# Patient Record
Sex: MALE | Race: ASIAN | NOT HISPANIC OR LATINO
[De-identification: names, ages, dates, MRNs, and addresses within clinical notes are randomized per-mention and may not be internally consistent; named-entity substitution may affect disease eponyms.]

---

## 2018-11-14 PROBLEM — Z00.00 ENCOUNTER FOR PREVENTIVE HEALTH EXAMINATION: Status: ACTIVE | Noted: 2018-11-14

## 2018-11-29 ENCOUNTER — APPOINTMENT (OUTPATIENT)
Dept: THORACIC SURGERY | Facility: CLINIC | Age: 63
End: 2018-11-29
Payer: COMMERCIAL

## 2018-11-29 VITALS
OXYGEN SATURATION: 100 % | DIASTOLIC BLOOD PRESSURE: 79 MMHG | TEMPERATURE: 97.5 F | SYSTOLIC BLOOD PRESSURE: 146 MMHG | HEART RATE: 88 BPM | RESPIRATION RATE: 16 BRPM | WEIGHT: 137 LBS | BODY MASS INDEX: 22.02 KG/M2 | HEIGHT: 66 IN

## 2018-11-29 PROCEDURE — 99204 OFFICE O/P NEW MOD 45 MIN: CPT

## 2018-11-29 RX ORDER — SENNOSIDES 8.6 MG/1
TABLET ORAL
Refills: 0 | Status: ACTIVE | COMMUNITY

## 2018-11-29 RX ORDER — LOSARTAN POTASSIUM 50 MG/1
50 TABLET, FILM COATED ORAL
Refills: 0 | Status: ACTIVE | COMMUNITY

## 2018-11-29 RX ORDER — SIMVASTATIN 5 MG/1
5 TABLET, FILM COATED ORAL
Refills: 0 | Status: ACTIVE | COMMUNITY

## 2018-12-03 ENCOUNTER — RESULT REVIEW (OUTPATIENT)
Age: 63
End: 2018-12-03

## 2018-12-03 ENCOUNTER — OUTPATIENT (OUTPATIENT)
Dept: OUTPATIENT SERVICES | Facility: HOSPITAL | Age: 63
LOS: 1 days | Discharge: HOME | End: 2018-12-03
Payer: MEDICAID

## 2018-12-03 ENCOUNTER — APPOINTMENT (OUTPATIENT)
Dept: THORACIC SURGERY | Facility: CLINIC | Age: 63
End: 2018-12-03

## 2018-12-03 VITALS
WEIGHT: 136.91 LBS | OXYGEN SATURATION: 98 % | SYSTOLIC BLOOD PRESSURE: 137 MMHG | TEMPERATURE: 99 F | HEART RATE: 72 BPM | RESPIRATION RATE: 16 BRPM | DIASTOLIC BLOOD PRESSURE: 63 MMHG | HEIGHT: 66 IN

## 2018-12-03 VITALS
HEART RATE: 73 BPM | TEMPERATURE: 98 F | DIASTOLIC BLOOD PRESSURE: 60 MMHG | RESPIRATION RATE: 18 BRPM | SYSTOLIC BLOOD PRESSURE: 131 MMHG | OXYGEN SATURATION: 99 %

## 2018-12-03 DIAGNOSIS — R91.8 OTHER NONSPECIFIC ABNORMAL FINDING OF LUNG FIELD: ICD-10-CM

## 2018-12-03 LAB
ALBUMIN SERPL ELPH-MCNC: 3.7 G/DL — SIGNIFICANT CHANGE UP (ref 3.3–5)
ALP SERPL-CCNC: 101 U/L — SIGNIFICANT CHANGE UP (ref 40–120)
ALT FLD-CCNC: 36 U/L — SIGNIFICANT CHANGE UP (ref 4–41)
APTT BLD: 25.2 SEC — LOW (ref 27.5–36.3)
AST SERPL-CCNC: 58 U/L — HIGH (ref 4–40)
BASOPHILS # BLD AUTO: 0.04 K/UL — SIGNIFICANT CHANGE UP (ref 0–0.2)
BASOPHILS NFR BLD AUTO: 0.3 % — SIGNIFICANT CHANGE UP (ref 0–2)
BILIRUB SERPL-MCNC: 0.3 MG/DL — SIGNIFICANT CHANGE UP (ref 0.2–1.2)
BLD GP AB SCN SERPL QL: NEGATIVE — SIGNIFICANT CHANGE UP
BUN SERPL-MCNC: 13 MG/DL — SIGNIFICANT CHANGE UP (ref 7–23)
CALCIUM SERPL-MCNC: 9.3 MG/DL — SIGNIFICANT CHANGE UP (ref 8.4–10.5)
CHLORIDE SERPL-SCNC: 98 MMOL/L — SIGNIFICANT CHANGE UP (ref 98–107)
CO2 SERPL-SCNC: 26 MMOL/L — SIGNIFICANT CHANGE UP (ref 22–31)
CREAT SERPL-MCNC: 0.96 MG/DL — SIGNIFICANT CHANGE UP (ref 0.5–1.3)
EOSINOPHIL # BLD AUTO: 0.41 K/UL — SIGNIFICANT CHANGE UP (ref 0–0.5)
EOSINOPHIL NFR BLD AUTO: 3.6 % — SIGNIFICANT CHANGE UP (ref 0–6)
GAS PNL BLDV: 137 MMOL/L — SIGNIFICANT CHANGE UP (ref 136–146)
GLUCOSE BLDV-MCNC: 155 — HIGH (ref 70–99)
GLUCOSE SERPL-MCNC: 148 MG/DL — HIGH (ref 70–99)
HCT VFR BLD CALC: 39 % — SIGNIFICANT CHANGE UP (ref 39–50)
HCT VFR BLDV CALC: 38.4 % — LOW (ref 39–51)
HGB BLD-MCNC: 12.2 G/DL — LOW (ref 13–17)
IMM GRANULOCYTES # BLD AUTO: 0.07 # — SIGNIFICANT CHANGE UP
IMM GRANULOCYTES NFR BLD AUTO: 0.6 % — SIGNIFICANT CHANGE UP (ref 0–1.5)
INR BLD: 0.97 — SIGNIFICANT CHANGE UP (ref 0.88–1.17)
LYMPHOCYTES # BLD AUTO: 2.5 K/UL — SIGNIFICANT CHANGE UP (ref 1–3.3)
LYMPHOCYTES # BLD AUTO: 21.7 % — SIGNIFICANT CHANGE UP (ref 13–44)
MCHC RBC-ENTMCNC: 26.3 PG — LOW (ref 27–34)
MCHC RBC-ENTMCNC: 31.3 % — LOW (ref 32–36)
MCV RBC AUTO: 84.2 FL — SIGNIFICANT CHANGE UP (ref 80–100)
MONOCYTES # BLD AUTO: 0.73 K/UL — SIGNIFICANT CHANGE UP (ref 0–0.9)
MONOCYTES NFR BLD AUTO: 6.3 % — SIGNIFICANT CHANGE UP (ref 2–14)
NEUTROPHILS # BLD AUTO: 7.78 K/UL — HIGH (ref 1.8–7.4)
NEUTROPHILS NFR BLD AUTO: 67.5 % — SIGNIFICANT CHANGE UP (ref 43–77)
NRBC # FLD: 0 — SIGNIFICANT CHANGE UP
PLATELET # BLD AUTO: 241 K/UL — SIGNIFICANT CHANGE UP (ref 150–400)
PMV BLD: 10.9 FL — SIGNIFICANT CHANGE UP (ref 7–13)
POTASSIUM BLDV-SCNC: 4.4 MMOL/L — SIGNIFICANT CHANGE UP (ref 3.4–4.5)
POTASSIUM SERPL-MCNC: 5.7 MMOL/L — HIGH (ref 3.5–5.3)
POTASSIUM SERPL-SCNC: 5.7 MMOL/L — HIGH (ref 3.5–5.3)
PROT SERPL-MCNC: 8.4 G/DL — HIGH (ref 6–8.3)
PROTHROM AB SERPL-ACNC: 10.8 SEC — SIGNIFICANT CHANGE UP (ref 9.8–13.1)
RBC # BLD: 4.63 M/UL — SIGNIFICANT CHANGE UP (ref 4.2–5.8)
RBC # FLD: 15.2 % — HIGH (ref 10.3–14.5)
RH IG SCN BLD-IMP: POSITIVE — SIGNIFICANT CHANGE UP
RH IG SCN BLD-IMP: POSITIVE — SIGNIFICANT CHANGE UP
SODIUM SERPL-SCNC: 136 MMOL/L — SIGNIFICANT CHANGE UP (ref 135–145)
WBC # BLD: 11.53 K/UL — HIGH (ref 3.8–10.5)
WBC # FLD AUTO: 11.53 K/UL — HIGH (ref 3.8–10.5)

## 2018-12-03 PROCEDURE — 88305 TISSUE EXAM BY PATHOLOGIST: CPT | Mod: 26

## 2018-12-03 PROCEDURE — 88331 PATH CONSLTJ SURG 1 BLK 1SPC: CPT | Mod: 26

## 2018-12-03 PROCEDURE — 88305 TISSUE EXAM BY PATHOLOGIST: CPT | Mod: 26,59

## 2018-12-03 PROCEDURE — 88112 CYTOPATH CELL ENHANCE TECH: CPT | Mod: 26

## 2018-12-03 RX ORDER — SODIUM CHLORIDE 9 MG/ML
3 INJECTION INTRAMUSCULAR; INTRAVENOUS; SUBCUTANEOUS EVERY 8 HOURS
Qty: 0 | Refills: 0 | Status: DISCONTINUED | OUTPATIENT
Start: 2018-12-03 | End: 2018-12-03

## 2018-12-03 RX ORDER — SODIUM CHLORIDE 9 MG/ML
1000 INJECTION, SOLUTION INTRAVENOUS
Qty: 0 | Refills: 0 | Status: DISCONTINUED | OUTPATIENT
Start: 2018-12-03 | End: 2018-12-03

## 2018-12-03 NOTE — H&P ADULT - NSHPREVIEWOFSYSTEMS_GEN_ALL_CORE
Physical Exam  Constitutional: alert, in no acute distress, well nourished and healthy appearing.   Eyes: the sclera and conjunctiva were normal and extraocular movements were intact.   ENT: hearing was normal and the lips and gums were normal.   Neck: the appearance of the neck was normal, the neck was supple and no neck mass was observed . there was no jugular-venous distention.   Pulmonary: no respiratory distress, normal respiratory rhythm and effort, no accessory muscle use and lungs were clear to auscultation bilaterally.   Heart: heart rate was normal and rhythm regular.   Chest: the chest was normal in appearance and no chest asymmetry.   Vascular: Radial: right 2+ and left 2+.   Breasts:. deferred.   Abdomen: normal bowel sounds and non-tender.   Genitourinary:. deferred.   Lymphatics: The posterior cervical, anterior cervical and supraclavicular nodes were non-tender and normal size.   Back: no costovertebral angle tenderness.   Musculoskeletal: normal gait.   Skin: normal skin color and pigmentation.   Neurological: no focal deficits.   Psychiatric: oriented to person, place, and time, insight and judgment were intact, the affect was normal and the mood was normal.      Assessment  Lymphadenopathy (785.6) (R59.1)  Lung mass (786.6) (R91.8)    63 year old male from [facility] presents today for thoracic surgery consultation. The patient reported experiencing a cough and chest pain. He had a chest Xray done on 11/5/18 which revealed a QING masslike opacity and enlargement of left hilum. There is pleural thickening adjacent to the masslike opacity.      A subsequent PETCT scan on 11/8/18 which reveals QING mass spiculated lesion measuring 3.3 x 2.6cm in axial cross section ( max SUV 14.4) which invades the adjacent pleura measuring up to 1.8cm in thickness (SUV 13.3.) and consistent with multifocal pleural malignancy with loculated pleural effusion extending to the posterior left lung apex. A confluent left suprahilar mass invading into the mediastinum measures up to 4..4 cm in size (SUV 15.4) narrowing the QING bronchi. There are separate mediastinal lymph nodes including a 2.2 x 1.7cm prevascular node (SUV 17.4). There are scattered hypermetabolic lytic foci in the left iliac bone (SUV 12.5), the left anterior sacrum (SUV 16.7) and he posterior elements of the T12 vertebral body (SUV 15.1). A 1.1 hypodense nodule in the upper pole left lobe of the thyroid SUV 10.2.     I have reviewed the patient medical records and diagnostic images. The patient appears to be Clinical Stage IV. I have recommended he undergo EBUS possible Right VATS, pleural biopsies. The risks, benefits, and alternatives to the procedure were discussed with the patient and his family at length. He verbalized understanding, and is in agreement with the above treatment plan. The patient completed cardiac clearance already.

## 2018-12-03 NOTE — ASU DISCHARGE PLAN (ADULT/PEDIATRIC). - NOTIFY
Bleeding that does not stop/shortness of breath/Swelling that continues Persistent Nausea and Vomiting/Inability to Tolerate Liquids or Foods/shortness of breath/Swelling that continues/Bleeding that does not stop

## 2018-12-03 NOTE — ASU PREOP CHECKLIST - 3.
Pt is French speaking, wishes for daughter Vicenta Ceballos to translate, confirmed via  phone [place]ita [phone]

## 2018-12-03 NOTE — ASU DISCHARGE PLAN (ADULT/PEDIATRIC). - MEDICATION SUMMARY - MEDICATIONS TO TAKE
I will START or STAY ON the medications listed below when I get home from the hospital:    losartan 50 mg oral tablet  -- 1 tab(s) by mouth once a day  -- Indication: For hypertension    metFORMIN 1000 mg oral tablet, extended release  -- 1 tab(s) by mouth once a day  -- Indication: For diabetes    promethazine  -- Indication: For anti vertigo/emetic    simvastatin 5 mg oral tablet  -- 1 tab(s) by mouth once a day (at bedtime)  -- Indication: For cholesterol    FeroSul  -- orally once a day  -- Indication: For supplement

## 2018-12-03 NOTE — BRIEF OPERATIVE NOTE - PROCEDURE
<<-----Click on this checkbox to enter Procedure Bronchoscopy  12/03/2018  endobronchial biopsy and BAL  Active  [email or web address]

## 2018-12-03 NOTE — H&P ADULT - HISTORY OF PRESENT ILLNESS
63 year old male from [facility] presents today for thoracic surgery consultation. The patient reported experiencing a cough and chest pain. He had a chest Xray on 11/5/18 which revealed a QING masslike opacity and enlargement of left hilum. There is pleural thickening adjacent to the masslike opacity.      A subsequent PETCT scan on 11/8/18 which reveals QING mass spiculated lesion measuring 3.3 x 2.6cm in axial cross section ( max SUV 14.4) which invades the adjacent pleura measuring up to 1.8cm in thickness (SUV 13.3.) and consistent with multifocal pleural malignancy with loculated pleural effusion extending to the posterior left lung apex. A confluent left suprahilar mass invading into the mediastinum measures up to 4..4 cm in size (SUV 15.4) narrowing the QNIG bronchi. There are separate mediastinal lymph nodes including a 2.2 x 1.7cm prevascular node (SUV 17.4). There are scattered hypermetabolic lytic foci in the left iliac bone (SUV 12.5), the left anterior sacrum (SUV 16.7) and he posterior elements of the T12 vertebral body (SUV 15.1). A 1.1 hypodense nodule in the upper pole left lobe of the thyroid SUV 10.2.     The patient reports dry cough. The patient denies shortness of breath, fever, chills, loss of appetite, dysphagia or hemoptysis.      Active Problems  Lung mass (786.6) (R91.8)  Lymphadenopathy (785.6) (R59.1)    Past Medical History  History of diabetes mellitus (V12.29) (Z86.39)  History of hypertension (V12.59) (Z86.79)    Surgical History  History of Foot surgery    Family History  No pertinent family history : Mother, Father    Social History  Former smoker (V15.82) (Z87.891)  No alcohol use  No illicit drug use    Current Meds  Amoxicillin 875 MG Oral Tablet  FeroSul TABS  Losartan Potassium 50 MG Oral Tablet  MetFORMIN HCl - 1000 MG Oral Tablet  Promethazine-DM 6.25-15 MG/5ML Oral Syrup  Senna Lax TABS  Simvastatin 5 MG Oral Tablet    Allergies  No Known Drug Allergies    Review of Systems    Respiratory: as noted in HPI.   Constitutional, Eyes, ENT, Cardiovascular, Gastrointestinal, Genitourinary, Musculoskeletal, Integumentary, Neurological, Psychiatric, Endocrine and Heme/Lymph are otherwise negative.      Vital Signs   	Recorded: 29Nov2018 10:26AM	  Systolic	146	  Diastolic	79	  Height	5 ft 6 in	  Weight	137 lb 	  BMI Calculated	22.11	  BSA Calculated	1.7	  Temperature	97.5 F	  Heart Rate	88	  Respiration	16	  O2 Saturation	100

## 2018-12-05 LAB — TM INTERPRETATION: SIGNIFICANT CHANGE UP

## 2018-12-20 ENCOUNTER — APPOINTMENT (OUTPATIENT)
Dept: THORACIC SURGERY | Facility: CLINIC | Age: 63
End: 2018-12-20
Payer: MEDICAID

## 2018-12-20 VITALS
HEIGHT: 66 IN | DIASTOLIC BLOOD PRESSURE: 70 MMHG | TEMPERATURE: 97.8 F | RESPIRATION RATE: 16 BRPM | HEART RATE: 81 BPM | OXYGEN SATURATION: 99 % | WEIGHT: 137 LBS | SYSTOLIC BLOOD PRESSURE: 145 MMHG | BODY MASS INDEX: 22.02 KG/M2

## 2018-12-20 DIAGNOSIS — R59.1 GENERALIZED ENLARGED LYMPH NODES: ICD-10-CM

## 2018-12-20 PROCEDURE — 99213 OFFICE O/P EST LOW 20 MIN: CPT

## 2018-12-20 RX ORDER — AMOXICILLIN 875 MG/1
875 TABLET, FILM COATED ORAL
Refills: 0 | Status: COMPLETED | COMMUNITY
End: 2018-12-20

## 2018-12-21 ENCOUNTER — OUTPATIENT (OUTPATIENT)
Dept: OUTPATIENT SERVICES | Facility: HOSPITAL | Age: 63
LOS: 1 days | Discharge: HOME | End: 2018-12-21
Payer: MEDICAID

## 2018-12-21 ENCOUNTER — RESULT REVIEW (OUTPATIENT)
Age: 63
End: 2018-12-21

## 2018-12-21 ENCOUNTER — APPOINTMENT (OUTPATIENT)
Dept: HEMATOLOGY ONCOLOGY | Facility: CLINIC | Age: 63
End: 2018-12-21
Payer: MEDICAID

## 2018-12-21 VITALS
HEIGHT: 65.16 IN | WEIGHT: 134.92 LBS | TEMPERATURE: 99.1 F | DIASTOLIC BLOOD PRESSURE: 81 MMHG | BODY MASS INDEX: 22.21 KG/M2 | RESPIRATION RATE: 17 BRPM | SYSTOLIC BLOOD PRESSURE: 156 MMHG | OXYGEN SATURATION: 97 % | HEART RATE: 80 BPM

## 2018-12-21 DIAGNOSIS — Z87.891 PERSONAL HISTORY OF NICOTINE DEPENDENCE: ICD-10-CM

## 2018-12-21 DIAGNOSIS — C34.90 MALIGNANT NEOPLASM OF UNSPECIFIED PART OF UNSPECIFIED BRONCHUS OR LUNG: ICD-10-CM

## 2018-12-21 LAB
ALBUMIN SERPL ELPH-MCNC: 4 G/DL
ALP BLD-CCNC: 115 U/L
ALT SERPL-CCNC: 23 U/L
ANION GAP SERPL CALC-SCNC: 12 MMOL/L
AST SERPL-CCNC: 22 U/L
BILIRUB SERPL-MCNC: 0.2 MG/DL
BUN SERPL-MCNC: 19 MG/DL
CALCIUM SERPL-MCNC: 9.6 MG/DL
CHLORIDE SERPL-SCNC: 103 MMOL/L
CO2 SERPL-SCNC: 28 MMOL/L
CREAT SERPL-MCNC: 1.21 MG/DL
GLUCOSE SERPL-MCNC: 97 MG/DL
MAGNESIUM SERPL-MCNC: 2.5 MG/DL
POTASSIUM SERPL-SCNC: 5.6 MMOL/L
PROT SERPL-MCNC: 7.3 G/DL
SODIUM SERPL-SCNC: 143 MMOL/L
URATE SERPL-MCNC: 4.1 MG/DL

## 2018-12-21 PROCEDURE — 99205 OFFICE O/P NEW HI 60 MIN: CPT

## 2018-12-21 PROCEDURE — 93010 ELECTROCARDIOGRAM REPORT: CPT

## 2018-12-21 RX ORDER — INSULIN GLARGINE 100 [IU]/ML
100 INJECTION, SOLUTION SUBCUTANEOUS
Refills: 0 | Status: ACTIVE | COMMUNITY
Start: 2018-12-21

## 2018-12-21 NOTE — PHYSICAL EXAM
[Normal] : normal spine exam without palpable tenderness, no kyphosis or scoliosis [de-identified] : decreased BS, few scattered crackles [de-identified] : s/p partial amputation left foot [de-identified] : alert, cooperative; no gross focal motor deficits

## 2018-12-21 NOTE — REASON FOR VISIT
[Initial Consultation] : an initial consultation [Spouse] : spouse [Other: _____] : [unfilled] [FreeTextEntry2] : SCLC

## 2018-12-21 NOTE — REVIEW OF SYSTEMS
[Patient Intake Form Reviewed] : Patient intake form was reviewed [Cough] : cough [Negative] : Allergic/Immunologic [Fainting] : no fainting [FreeTextEntry5] : chest discomfort [de-identified] : ?mild dementia per daughter

## 2018-12-21 NOTE — CONSULT LETTER
[Dear  ___] : Dear  [unfilled], [Consult Letter:] : I had the pleasure of evaluating your patient, [unfilled]. [Please see my note below.] : Please see my note below. [Consult Closing:] : Thank you very much for allowing me to participate in the care of this patient.  If you have any questions, please do not hesitate to contact me. [DrDougie  ___] : Dr. LORENZ [FreeTextEntry3] : Joseline Thao M.D.

## 2018-12-21 NOTE — ASSESSMENT
[FreeTextEntry1] : Extensive SCLC (T3N2M1c-Stage IVB)-reviewed pathology and imaging results with patient and his family. Patient will have brain MRI to complete EOD evaluation. Clinically extensive (stage IV) small cell lung cancer. Discussed surgical/radiation/systemic therapy issues in small cell lung cancer treatment. No surgery planned. May consider palliative/prophylactic radiation pending course (holding on for now). Recommend systemic therapy-patient consents to etoposide/carboplatin regimen-potential side effects reviewed including but not limited to alopecia, nausea/vomiting, bone marrow toxicity. Discussed possible future consideration of immunotherapy(? clinical trial), pending course.\par In light of the extensive lung cancer, holding on further thyroid evaluation for now. Interval followup imaging planned.\par Patient and his family were given the opportunity to ask questions. Their questions have been answered to their apparent satisfaction.\par Case discussed with surgeon Dr. Looney.\par

## 2018-12-21 NOTE — HISTORY OF PRESENT ILLNESS
[Disease: _____________________] : Disease: [unfilled] [T: ___] : T[unfilled] [N: ___] : N[unfilled] [M: ___] : M[unfilled] [AJCC Stage: ____] : AJCC Stage: [unfilled] [de-identified] : Patient came [place] the [place] [facility] 9/2018. He reported an approximately one-year history of a cough. Recently has had some associated chest discomfort. He saw his primary care physician for evaluation, and chest x-ray 11/2018 showed a left upper lobe masslike opacity measuring 3.7 cm in maximum dimension. Also enlargement of the left hilum. Pleural thickening adjacent to the masslike opacity. PET/CT scan showed a hypermetabolic spiculated left upper lobe lung mass with gross involvement of the mid to upper left pleura with associated loculated effusions. Suspicious hypermetabolic regional megan disease in the left hilum and left anterior mediastinum. Hypermetabolic lytic foci in the axial skeleton suspicious for multifocal osteolytic metastatic disease. A hypermetabolic nodule noted in the left lobe of the thyroid, nonspecific.\par FB, EBUS biopsy 12/3/18 performed (Dr. Looney). Left upper lobe endobronchial lesion/mass consistent with small cell carcinoma. BAL of left upper lobe negative for malignant cells.\par Patient has had no associated hemoptysis. He has had a good appetite. No complaints of headache, change in vision, lateralizing weakness, abdominal pain.\par He was accompanied today by his daughter (health care proxy) Lin, and his wife.\par Patient declined  service. [de-identified] : small cell carcinoma

## 2018-12-27 ENCOUNTER — FORM ENCOUNTER (OUTPATIENT)
Age: 63
End: 2018-12-27

## 2018-12-28 ENCOUNTER — APPOINTMENT (OUTPATIENT)
Dept: MRI IMAGING | Facility: CLINIC | Age: 63
End: 2018-12-28
Payer: MEDICAID

## 2018-12-28 ENCOUNTER — OUTPATIENT (OUTPATIENT)
Dept: OUTPATIENT SERVICES | Facility: HOSPITAL | Age: 63
LOS: 1 days | End: 2018-12-28
Payer: COMMERCIAL

## 2018-12-28 DIAGNOSIS — Z98.890 OTHER SPECIFIED POSTPROCEDURAL STATES: Chronic | ICD-10-CM

## 2018-12-28 DIAGNOSIS — C34.90 MALIGNANT NEOPLASM OF UNSPECIFIED PART OF UNSPECIFIED BRONCHUS OR LUNG: ICD-10-CM

## 2018-12-28 PROCEDURE — A9585: CPT

## 2018-12-28 PROCEDURE — 70553 MRI BRAIN STEM W/O & W/DYE: CPT | Mod: 26

## 2018-12-28 PROCEDURE — 70553 MRI BRAIN STEM W/O & W/DYE: CPT

## 2018-12-31 ENCOUNTER — LABORATORY RESULT (OUTPATIENT)
Age: 63
End: 2018-12-31

## 2018-12-31 ENCOUNTER — APPOINTMENT (OUTPATIENT)
Dept: INFUSION THERAPY | Facility: HOSPITAL | Age: 63
End: 2018-12-31

## 2018-12-31 ENCOUNTER — RESULT REVIEW (OUTPATIENT)
Age: 63
End: 2018-12-31

## 2019-01-02 ENCOUNTER — OTHER (OUTPATIENT)
Age: 64
End: 2019-01-02

## 2019-01-02 ENCOUNTER — APPOINTMENT (OUTPATIENT)
Dept: INFUSION THERAPY | Facility: HOSPITAL | Age: 64
End: 2019-01-02

## 2019-01-02 ENCOUNTER — LABORATORY RESULT (OUTPATIENT)
Age: 64
End: 2019-01-02

## 2019-01-02 DIAGNOSIS — Z51.11 ENCOUNTER FOR ANTINEOPLASTIC CHEMOTHERAPY: ICD-10-CM

## 2019-01-02 DIAGNOSIS — R11.2 NAUSEA WITH VOMITING, UNSPECIFIED: ICD-10-CM

## 2019-01-03 ENCOUNTER — APPOINTMENT (OUTPATIENT)
Dept: INFUSION THERAPY | Facility: HOSPITAL | Age: 64
End: 2019-01-03

## 2019-01-04 ENCOUNTER — APPOINTMENT (OUTPATIENT)
Dept: INFUSION THERAPY | Facility: HOSPITAL | Age: 64
End: 2019-01-04

## 2019-01-04 DIAGNOSIS — R52 PAIN, UNSPECIFIED: ICD-10-CM

## 2019-01-05 ENCOUNTER — APPOINTMENT (OUTPATIENT)
Dept: INFUSION THERAPY | Facility: HOSPITAL | Age: 64
End: 2019-01-05

## 2019-01-07 DIAGNOSIS — Z51.89 ENCOUNTER FOR OTHER SPECIFIED AFTERCARE: ICD-10-CM

## 2019-01-09 ENCOUNTER — RESULT REVIEW (OUTPATIENT)
Age: 64
End: 2019-01-09

## 2019-01-09 ENCOUNTER — APPOINTMENT (OUTPATIENT)
Dept: HEMATOLOGY ONCOLOGY | Facility: CLINIC | Age: 64
End: 2019-01-09
Payer: MEDICAID

## 2019-01-09 VITALS
OXYGEN SATURATION: 98 % | WEIGHT: 137.79 LBS | DIASTOLIC BLOOD PRESSURE: 77 MMHG | BODY MASS INDEX: 22.82 KG/M2 | HEART RATE: 82 BPM | SYSTOLIC BLOOD PRESSURE: 148 MMHG | TEMPERATURE: 98.7 F | RESPIRATION RATE: 16 BRPM

## 2019-01-09 PROCEDURE — 99214 OFFICE O/P EST MOD 30 MIN: CPT

## 2019-01-09 NOTE — HISTORY OF PRESENT ILLNESS
[Disease: _____________________] : Disease: [unfilled] [T: ___] : T[unfilled] [N: ___] : N[unfilled] [M: ___] : M[unfilled] [AJCC Stage: ____] : AJCC Stage: [unfilled] [de-identified] : Patient came [place] the [place] from [facility] 9/2018. He reported an approximately one-year history of a cough. Recently had had some associated chest discomfort. He saw his primary care physician for evaluation, and chest x-ray 11/2018 showed a left upper lobe masslike opacity measuring 3.7 cm in maximum dimension. Also enlargement of the left hilum. Pleural thickening adjacent to the masslike opacity. PET/CT scan showed a hypermetabolic spiculated left upper lobe lung mass with gross involvement of the mid to upper left pleura with associated loculated effusions. Suspicious hypermetabolic regional megan disease in the left hilum and left anterior mediastinum. Hypermetabolic lytic foci in the axial skeleton suspicious for multifocal osteolytic metastatic disease. A hypermetabolic nodule noted in the left lobe of the thyroid, nonspecific.\par FB, EBUS biopsy 12/3/18 performed (Dr. Looney). Left upper lobe endobronchial lesion/mass consistent with small cell carcinoma. BAL of left upper lobe negative for malignant cells. Patient began Etoposide/Carboplatin 12/31/19.\par  [de-identified] : small cell carcinoma [de-identified] : +Intermittent left CW/upper back pain-unrelieved with Tramadol, better with Naprosyn. No associated paresthesias.\par No c/o cough/hemoptysis/SOB.\par Patient was accompanied today by his daughter (health care proxy) Lin, and his wife.\par

## 2019-01-09 NOTE — ASSESSMENT
[FreeTextEntry1] : Extensive small cell lung cancer-status post one cycle of etoposide/carboplatin chemotherapy. Cytopenias secondary to chemotherapy. Potential side effects of treatment reviewed.\par Patient and his family were given the opportunity to ask questions. Their questions have been answered to their satisfaction at this time, and they concur with plans of care.

## 2019-01-09 NOTE — PHYSICAL EXAM
[Normal] : affect appropriate [de-identified] : decreased BS, few scattered crackles [de-identified] : s/p partial amputation left foot [de-identified] : alert, cooperative; no gross focal motor deficits

## 2019-01-09 NOTE — REVIEW OF SYSTEMS
[Negative] : Allergic/Immunologic [Palpitations] : no palpitations [Shortness Of Breath] : no shortness of breath [Abdominal Pain] : no abdominal pain [Muscle Weakness] : no muscle weakness

## 2019-01-17 ENCOUNTER — OUTPATIENT (OUTPATIENT)
Dept: OUTPATIENT SERVICES | Facility: HOSPITAL | Age: 64
LOS: 1 days | Discharge: HOME | End: 2019-01-17

## 2019-01-17 DIAGNOSIS — C34.90 MALIGNANT NEOPLASM OF UNSPECIFIED PART OF UNSPECIFIED BRONCHUS OR LUNG: ICD-10-CM

## 2019-01-17 DIAGNOSIS — Z98.890 OTHER SPECIFIED POSTPROCEDURAL STATES: Chronic | ICD-10-CM

## 2019-01-17 PROBLEM — I10 ESSENTIAL (PRIMARY) HYPERTENSION: Chronic | Status: ACTIVE | Noted: 2018-12-27

## 2019-01-17 PROBLEM — E11.9 TYPE 2 DIABETES MELLITUS WITHOUT COMPLICATIONS: Chronic | Status: ACTIVE | Noted: 2018-12-27

## 2019-01-23 ENCOUNTER — RESULT REVIEW (OUTPATIENT)
Age: 64
End: 2019-01-23

## 2019-01-23 ENCOUNTER — FORM ENCOUNTER (OUTPATIENT)
Age: 64
End: 2019-01-23

## 2019-01-23 ENCOUNTER — LABORATORY RESULT (OUTPATIENT)
Age: 64
End: 2019-01-23

## 2019-01-23 ENCOUNTER — RX RENEWAL (OUTPATIENT)
Age: 64
End: 2019-01-23

## 2019-01-23 ENCOUNTER — APPOINTMENT (OUTPATIENT)
Dept: INFUSION THERAPY | Facility: HOSPITAL | Age: 64
End: 2019-01-23

## 2019-01-23 DIAGNOSIS — M25.569 PAIN IN UNSPECIFIED KNEE: ICD-10-CM

## 2019-01-23 DIAGNOSIS — M79.89 OTHER SPECIFIED SOFT TISSUE DISORDERS: ICD-10-CM

## 2019-01-24 ENCOUNTER — OUTPATIENT (OUTPATIENT)
Dept: OUTPATIENT SERVICES | Facility: HOSPITAL | Age: 64
LOS: 1 days | End: 2019-01-24
Payer: MEDICAID

## 2019-01-24 ENCOUNTER — APPOINTMENT (OUTPATIENT)
Dept: INFUSION THERAPY | Facility: HOSPITAL | Age: 64
End: 2019-01-24

## 2019-01-24 ENCOUNTER — APPOINTMENT (OUTPATIENT)
Dept: ULTRASOUND IMAGING | Facility: IMAGING CENTER | Age: 64
End: 2019-01-24
Payer: MEDICAID

## 2019-01-24 DIAGNOSIS — Z51.11 ENCOUNTER FOR ANTINEOPLASTIC CHEMOTHERAPY: ICD-10-CM

## 2019-01-24 DIAGNOSIS — M79.89 OTHER SPECIFIED SOFT TISSUE DISORDERS: ICD-10-CM

## 2019-01-24 DIAGNOSIS — Z98.890 OTHER SPECIFIED POSTPROCEDURAL STATES: Chronic | ICD-10-CM

## 2019-01-24 DIAGNOSIS — R11.2 NAUSEA WITH VOMITING, UNSPECIFIED: ICD-10-CM

## 2019-01-24 PROCEDURE — 93971 EXTREMITY STUDY: CPT

## 2019-01-24 PROCEDURE — 93971 EXTREMITY STUDY: CPT | Mod: 26,RT

## 2019-01-24 PROCEDURE — 73564 X-RAY EXAM KNEE 4 OR MORE: CPT | Mod: 26,RT

## 2019-01-24 PROCEDURE — 73564 X-RAY EXAM KNEE 4 OR MORE: CPT

## 2019-01-25 ENCOUNTER — APPOINTMENT (OUTPATIENT)
Dept: INFUSION THERAPY | Facility: HOSPITAL | Age: 64
End: 2019-01-25

## 2019-01-26 ENCOUNTER — APPOINTMENT (OUTPATIENT)
Dept: INFUSION THERAPY | Facility: HOSPITAL | Age: 64
End: 2019-01-26

## 2019-01-29 DIAGNOSIS — Z51.89 ENCOUNTER FOR OTHER SPECIFIED AFTERCARE: ICD-10-CM

## 2019-02-05 ENCOUNTER — APPOINTMENT (OUTPATIENT)
Dept: HEMATOLOGY ONCOLOGY | Facility: CLINIC | Age: 64
End: 2019-02-05
Payer: MEDICAID

## 2019-02-05 VITALS
WEIGHT: 134.48 LBS | SYSTOLIC BLOOD PRESSURE: 99 MMHG | TEMPERATURE: 97.9 F | BODY MASS INDEX: 22.27 KG/M2 | RESPIRATION RATE: 16 BRPM | DIASTOLIC BLOOD PRESSURE: 62 MMHG | HEART RATE: 77 BPM | OXYGEN SATURATION: 95 %

## 2019-02-05 PROCEDURE — 99214 OFFICE O/P EST MOD 30 MIN: CPT

## 2019-02-05 NOTE — PHYSICAL EXAM
[Normal] : affect appropriate [de-identified] : non-toxic appearing [de-identified] : decreased BS bases [de-identified] : chronic venous stasis changes; no calf tenderness [de-identified] : soft, NT without palpable hepatosplenomegaly; +BS [de-identified] : s/p partial amputation left foot [de-identified] : alert, cooperative; no gross focal motor deficits

## 2019-02-05 NOTE — ASSESSMENT
[FreeTextEntry1] : SCLC-clinically stable at present-pain resolved. Potential side effects of chemotherapy/Neulasta reviewed.\par Patient/daughter were given the opportunity to ask questions, and their questions have been answered at this time. They concur with plans of care.

## 2019-02-05 NOTE — HISTORY OF PRESENT ILLNESS
[Disease: _____________________] : Disease: [unfilled] [T: ___] : T[unfilled] [N: ___] : N[unfilled] [M: ___] : M[unfilled] [AJCC Stage: ____] : AJCC Stage: [unfilled] [de-identified] : Patient came [place] the [place] [facility] 9/2018. He reported an approximately one-year history of a cough. Recently had had some associated chest discomfort. He saw his primary care physician for evaluation, and chest x-ray 11/2018 showed a left upper lobe masslike opacity measuring 3.7 cm in maximum dimension. Also enlargement of the left hilum. Pleural thickening adjacent to the masslike opacity. PET/CT scan showed a hypermetabolic spiculated left upper lobe lung mass with gross involvement of the mid to upper left pleura with associated loculated effusions. Suspicious hypermetabolic regional megan disease in the left hilum and left anterior mediastinum. Hypermetabolic lytic foci in the axial skeleton suspicious for multifocal osteolytic metastatic disease. A hypermetabolic nodule noted in the left lobe of the thyroid, nonspecific.\par FB, EBUS biopsy 12/3/18 performed (Dr. Looney). Left upper lobe endobronchial lesion/mass consistent with small cell carcinoma. BAL of left upper lobe negative for malignant cells. Patient began Etoposide/Carboplatin 12/31/19.\par  [de-identified] : small cell carcinoma [de-identified] : Denies pain now. No c/o CP or SOB. +Fatigue. \par No c/o cough/hemoptysis.\par Patient was accompanied today by his daughter (health care proxy) Lin.\par

## 2019-02-05 NOTE — REVIEW OF SYSTEMS
[Negative] : Allergic/Immunologic [Mucosal Pain] : no mucosal pain [Abdominal Pain] : no abdominal pain

## 2019-02-07 NOTE — ASU DISCHARGE PLAN (ADULT/PEDIATRIC). - DIET
Duration Of Freeze Thaw-Cycle (Seconds): 0 Post-Care Instructions: I reviewed with the patient in detail post-care instructions. Patient is to wear sunprotection, and avoid picking at any of the treated lesions. Pt may apply Vaseline to crusted or scabbing areas. Detail Level: Detailed Render Post-Care Instructions In Note?: no Consent: The patient's consent was obtained including but not limited to risks of crusting, scabbing, blistering, scarring, darker or lighter pigmentary change, recurrence, incomplete removal and infection. no change

## 2019-02-11 ENCOUNTER — OTHER (OUTPATIENT)
Age: 64
End: 2019-02-11

## 2019-02-13 ENCOUNTER — LABORATORY RESULT (OUTPATIENT)
Age: 64
End: 2019-02-13

## 2019-02-13 ENCOUNTER — RESULT REVIEW (OUTPATIENT)
Age: 64
End: 2019-02-13

## 2019-02-13 ENCOUNTER — APPOINTMENT (OUTPATIENT)
Dept: INFUSION THERAPY | Facility: HOSPITAL | Age: 64
End: 2019-02-13

## 2019-02-14 ENCOUNTER — FORM ENCOUNTER (OUTPATIENT)
Age: 64
End: 2019-02-14

## 2019-02-14 ENCOUNTER — APPOINTMENT (OUTPATIENT)
Dept: INFUSION THERAPY | Facility: HOSPITAL | Age: 64
End: 2019-02-14

## 2019-02-15 ENCOUNTER — OUTPATIENT (OUTPATIENT)
Dept: OUTPATIENT SERVICES | Facility: HOSPITAL | Age: 64
LOS: 1 days | End: 2019-02-15
Payer: MEDICAID

## 2019-02-15 ENCOUNTER — APPOINTMENT (OUTPATIENT)
Dept: INFUSION THERAPY | Facility: HOSPITAL | Age: 64
End: 2019-02-15

## 2019-02-15 ENCOUNTER — APPOINTMENT (OUTPATIENT)
Dept: CT IMAGING | Facility: CLINIC | Age: 64
End: 2019-02-15
Payer: MEDICAID

## 2019-02-15 DIAGNOSIS — C34.90 MALIGNANT NEOPLASM OF UNSPECIFIED PART OF UNSPECIFIED BRONCHUS OR LUNG: ICD-10-CM

## 2019-02-15 DIAGNOSIS — Z98.890 OTHER SPECIFIED POSTPROCEDURAL STATES: Chronic | ICD-10-CM

## 2019-02-15 PROCEDURE — 74177 CT ABD & PELVIS W/CONTRAST: CPT

## 2019-02-15 PROCEDURE — 74177 CT ABD & PELVIS W/CONTRAST: CPT | Mod: 26

## 2019-02-15 PROCEDURE — 71260 CT THORAX DX C+: CPT | Mod: 26

## 2019-02-15 PROCEDURE — 71260 CT THORAX DX C+: CPT

## 2019-02-16 ENCOUNTER — APPOINTMENT (OUTPATIENT)
Dept: INFUSION THERAPY | Facility: HOSPITAL | Age: 64
End: 2019-02-16

## 2019-02-19 ENCOUNTER — RESULT REVIEW (OUTPATIENT)
Age: 64
End: 2019-02-19

## 2019-02-19 ENCOUNTER — OUTPATIENT (OUTPATIENT)
Dept: OUTPATIENT SERVICES | Facility: HOSPITAL | Age: 64
LOS: 1 days | Discharge: HOME | End: 2019-02-19

## 2019-02-19 ENCOUNTER — APPOINTMENT (OUTPATIENT)
Dept: HEMATOLOGY ONCOLOGY | Facility: CLINIC | Age: 64
End: 2019-02-19

## 2019-02-19 ENCOUNTER — OUTPATIENT (OUTPATIENT)
Dept: OUTPATIENT SERVICES | Facility: HOSPITAL | Age: 64
LOS: 1 days | End: 2019-02-19
Payer: MEDICAID

## 2019-02-19 DIAGNOSIS — Z98.890 OTHER SPECIFIED POSTPROCEDURAL STATES: Chronic | ICD-10-CM

## 2019-02-19 DIAGNOSIS — C34.90 MALIGNANT NEOPLASM OF UNSPECIFIED PART OF UNSPECIFIED BRONCHUS OR LUNG: ICD-10-CM

## 2019-02-19 LAB
BLD GP AB SCN SERPL QL: NEGATIVE — SIGNIFICANT CHANGE UP
RH IG SCN BLD-IMP: POSITIVE — SIGNIFICANT CHANGE UP
RH IG SCN BLD-IMP: POSITIVE — SIGNIFICANT CHANGE UP

## 2019-02-21 ENCOUNTER — APPOINTMENT (OUTPATIENT)
Dept: INFUSION THERAPY | Facility: HOSPITAL | Age: 64
End: 2019-02-21

## 2019-02-22 DIAGNOSIS — Z51.89 ENCOUNTER FOR OTHER SPECIFIED AFTERCARE: ICD-10-CM

## 2019-02-22 LAB
ALBUMIN SERPL ELPH-MCNC: 3.6 G/DL
ALP BLD-CCNC: 179 U/L
ALT SERPL-CCNC: 12 U/L
ANION GAP SERPL CALC-SCNC: 11 MMOL/L
AST SERPL-CCNC: 20 U/L
BILIRUB SERPL-MCNC: 0.4 MG/DL
BUN SERPL-MCNC: 21 MG/DL
CALCIUM SERPL-MCNC: 8.6 MG/DL
CHLORIDE SERPL-SCNC: 102 MMOL/L
CO2 SERPL-SCNC: 23 MMOL/L
CREAT SERPL-MCNC: 1.14 MG/DL
GLUCOSE SERPL-MCNC: 222 MG/DL
MAGNESIUM SERPL-MCNC: 1.8 MG/DL
POTASSIUM SERPL-SCNC: 5 MMOL/L
PROT SERPL-MCNC: 6.6 G/DL
SODIUM SERPL-SCNC: 136 MMOL/L

## 2019-02-25 ENCOUNTER — APPOINTMENT (OUTPATIENT)
Dept: HEMATOLOGY ONCOLOGY | Facility: CLINIC | Age: 64
End: 2019-02-25
Payer: MEDICAID

## 2019-02-25 VITALS
BODY MASS INDEX: 22.93 KG/M2 | DIASTOLIC BLOOD PRESSURE: 74 MMHG | OXYGEN SATURATION: 97 % | WEIGHT: 138.45 LBS | HEART RATE: 78 BPM | TEMPERATURE: 98.4 F | SYSTOLIC BLOOD PRESSURE: 123 MMHG | RESPIRATION RATE: 18 BRPM

## 2019-02-25 PROCEDURE — 99214 OFFICE O/P EST MOD 30 MIN: CPT

## 2019-02-25 RX ORDER — BLOOD PRESSURE TEST KIT-LARGE
KIT MISCELLANEOUS
Qty: 1 | Refills: 0 | Status: ACTIVE | COMMUNITY
Start: 2019-02-23

## 2019-02-25 RX ORDER — DOCUSATE SODIUM 100 MG/1
100 CAPSULE, LIQUID FILLED ORAL
Qty: 30 | Refills: 0 | Status: ACTIVE | COMMUNITY
Start: 2019-02-22

## 2019-02-25 RX ORDER — ISOPROPYL ALCOHOL 70 ML/100ML
70 SWAB TOPICAL
Qty: 100 | Refills: 0 | Status: ACTIVE | COMMUNITY
Start: 2019-02-22

## 2019-02-25 RX ORDER — PEN NEEDLE, DIABETIC 29 G X1/2"
32G X 4 MM NEEDLE, DISPOSABLE MISCELLANEOUS
Qty: 30 | Refills: 0 | Status: ACTIVE | COMMUNITY
Start: 2019-02-22

## 2019-02-25 RX ORDER — LANCETS 33 GAUGE
EACH MISCELLANEOUS
Qty: 100 | Refills: 0 | Status: ACTIVE | COMMUNITY
Start: 2019-02-22

## 2019-02-25 RX ORDER — AMLODIPINE BESYLATE 5 MG/1
5 TABLET ORAL
Qty: 30 | Refills: 0 | Status: ACTIVE | COMMUNITY
Start: 2019-02-22

## 2019-02-25 RX ORDER — INSULIN GLARGINE 100 [IU]/ML
100 INJECTION, SOLUTION SUBCUTANEOUS
Qty: 9 | Refills: 0 | Status: ACTIVE | COMMUNITY
Start: 2019-02-22

## 2019-02-25 RX ORDER — BLOOD SUGAR DIAGNOSTIC
STRIP MISCELLANEOUS
Qty: 50 | Refills: 0 | Status: ACTIVE | COMMUNITY
Start: 2019-02-22

## 2019-02-25 NOTE — PHYSICAL EXAM
[Normal] : affect appropriate [de-identified] : non-toxic appearing [de-identified] : decreased BS bases [de-identified] : chronic venous stasis changes; no calf tenderness; trace B/L pedal edema [de-identified] : soft, NT without palpable hepatosplenomegaly; +BS [de-identified] : s/p partial amputation left foot [de-identified] : alert, cooperative; no gross focal motor deficits

## 2019-02-25 NOTE — RESULTS/DATA
[FreeTextEntry1] : CT chest/abdomen/pelvis-nodular foci of the left upper lobe of the lung with extension to the adjacent pleura. 2 additional adjacent left upper lobe pulmonary nodules with associated left hilar adenopathy. Lytic lesions of the bone suspicious for metastatic disease. Prostatic enlargement. Cholelithiasis.

## 2019-02-25 NOTE — ASSESSMENT
[FreeTextEntry1] : Extensive small cell lung cancer-clinically appears to be responding to treatment (have asked to have prior PET/CT scan compared to recent CT scan by radiology). For now, recommend continuing current therapy with interval PET/CT scan planned. Pending course, to consider maintenance therapy as well.\par Holding on further evaluation of the prostate for now, in light of known extensive lung cancer.\par Patient/daughter were given the opportunity to ask questions. Their questions have been answered to their apparent satisfaction at this time.

## 2019-02-25 NOTE — HISTORY OF PRESENT ILLNESS
[Disease: _____________________] : Disease: [unfilled] [T: ___] : T[unfilled] [N: ___] : N[unfilled] [M: ___] : M[unfilled] [AJCC Stage: ____] : AJCC Stage: [unfilled] [de-identified] : Patient came [place] the [place] from [facility] 9/2018. He reported an approximately one-year history of a cough. Recently had had some associated chest discomfort. He saw his primary care physician for evaluation, and chest x-ray 11/2018 showed a left upper lobe masslike opacity measuring 3.7 cm in maximum dimension. Also enlargement of the left hilum. Pleural thickening adjacent to the masslike opacity. PET/CT scan showed a hypermetabolic spiculated left upper lobe lung mass with gross involvement of the mid to upper left pleura with associated loculated effusions. Suspicious hypermetabolic regional megan disease in the left hilum and left anterior mediastinum. Hypermetabolic lytic foci in the axial skeleton suspicious for multifocal osteolytic metastatic disease. A hypermetabolic nodule noted in the left lobe of the thyroid, nonspecific.\par FB, EBUS biopsy 12/3/18 performed (Dr. Looney). Left upper lobe endobronchial lesion/mass consistent with small cell carcinoma. BAL of left upper lobe negative for malignant cells. Patient began Etoposide/Carboplatin 12/31/19.\par  [de-identified] : small cell carcinoma [de-identified] : No pain. No c/o CP or SOB.\par No c/o cough/hemoptysis.\par Patient was accompanied today by his daughter (health care proxy) Lin.\par

## 2019-03-01 ENCOUNTER — MEDICATION RENEWAL (OUTPATIENT)
Age: 64
End: 2019-03-01

## 2019-03-04 ENCOUNTER — OUTPATIENT (OUTPATIENT)
Dept: OUTPATIENT SERVICES | Facility: HOSPITAL | Age: 64
LOS: 1 days | Discharge: HOME | End: 2019-03-04

## 2019-03-04 DIAGNOSIS — Z98.890 OTHER SPECIFIED POSTPROCEDURAL STATES: Chronic | ICD-10-CM

## 2019-03-04 DIAGNOSIS — C34.90 MALIGNANT NEOPLASM OF UNSPECIFIED PART OF UNSPECIFIED BRONCHUS OR LUNG: ICD-10-CM

## 2019-03-06 ENCOUNTER — LABORATORY RESULT (OUTPATIENT)
Age: 64
End: 2019-03-06

## 2019-03-06 ENCOUNTER — RESULT REVIEW (OUTPATIENT)
Age: 64
End: 2019-03-06

## 2019-03-06 ENCOUNTER — APPOINTMENT (OUTPATIENT)
Dept: INFUSION THERAPY | Facility: HOSPITAL | Age: 64
End: 2019-03-06

## 2019-03-06 PROCEDURE — 86923 COMPATIBILITY TEST ELECTRIC: CPT

## 2019-03-06 PROCEDURE — 86900 BLOOD TYPING SEROLOGIC ABO: CPT

## 2019-03-06 PROCEDURE — 86850 RBC ANTIBODY SCREEN: CPT

## 2019-03-06 PROCEDURE — 86901 BLOOD TYPING SEROLOGIC RH(D): CPT

## 2019-03-07 ENCOUNTER — APPOINTMENT (OUTPATIENT)
Dept: INFUSION THERAPY | Facility: HOSPITAL | Age: 64
End: 2019-03-07

## 2019-03-07 DIAGNOSIS — Z51.11 ENCOUNTER FOR ANTINEOPLASTIC CHEMOTHERAPY: ICD-10-CM

## 2019-03-07 DIAGNOSIS — R11.2 NAUSEA WITH VOMITING, UNSPECIFIED: ICD-10-CM

## 2019-03-08 ENCOUNTER — APPOINTMENT (OUTPATIENT)
Dept: INFUSION THERAPY | Facility: HOSPITAL | Age: 64
End: 2019-03-08

## 2019-03-09 ENCOUNTER — APPOINTMENT (OUTPATIENT)
Dept: INFUSION THERAPY | Facility: HOSPITAL | Age: 64
End: 2019-03-09

## 2019-03-12 DIAGNOSIS — Z51.89 ENCOUNTER FOR OTHER SPECIFIED AFTERCARE: ICD-10-CM

## 2019-04-01 ENCOUNTER — OUTPATIENT (OUTPATIENT)
Dept: OUTPATIENT SERVICES | Facility: HOSPITAL | Age: 64
LOS: 1 days | Discharge: HOME | End: 2019-04-01

## 2019-04-01 DIAGNOSIS — Z98.890 OTHER SPECIFIED POSTPROCEDURAL STATES: Chronic | ICD-10-CM

## 2019-04-01 DIAGNOSIS — C34.90 MALIGNANT NEOPLASM OF UNSPECIFIED PART OF UNSPECIFIED BRONCHUS OR LUNG: ICD-10-CM

## 2019-04-10 ENCOUNTER — APPOINTMENT (OUTPATIENT)
Dept: HEMATOLOGY ONCOLOGY | Facility: CLINIC | Age: 64
End: 2019-04-10
Payer: MEDICAID

## 2019-04-10 VITALS
BODY MASS INDEX: 22.82 KG/M2 | OXYGEN SATURATION: 97 % | WEIGHT: 137.79 LBS | HEART RATE: 84 BPM | TEMPERATURE: 98 F | RESPIRATION RATE: 18 BRPM | SYSTOLIC BLOOD PRESSURE: 137 MMHG | DIASTOLIC BLOOD PRESSURE: 77 MMHG

## 2019-04-10 PROCEDURE — 99215 OFFICE O/P EST HI 40 MIN: CPT

## 2019-04-10 NOTE — HISTORY OF PRESENT ILLNESS
[Disease: _____________________] : Disease: [unfilled] [T: ___] : T[unfilled] [N: ___] : N[unfilled] [M: ___] : M[unfilled] [AJCC Stage: ____] : AJCC Stage: [unfilled] [de-identified] : Patient came [place] the [place] from [facility] 9/2018. He reported an approximately one-year history of a cough. Recently had had some associated chest discomfort. He saw his primary care physician for evaluation, and chest x-ray 11/2018 showed a left upper lobe masslike opacity measuring 3.7 cm in maximum dimension. Also enlargement of the left hilum. Pleural thickening adjacent to the masslike opacity. PET/CT scan showed a hypermetabolic spiculated left upper lobe lung mass with gross involvement of the mid to upper left pleura with associated loculated effusions. Suspicious hypermetabolic regional megan disease in the left hilum and left anterior mediastinum. Hypermetabolic lytic foci in the axial skeleton suspicious for multifocal osteolytic metastatic disease. A hypermetabolic nodule noted in the left lobe of the thyroid, nonspecific.\par FB, EBUS biopsy 12/3/18 performed (Dr. Looney). Left upper lobe endobronchial lesion/mass consistent with small cell carcinoma. BAL of left upper lobe negative for malignant cells. Patient began Etoposide/Carboplatin 12/31/18, completing 4th cycle 3/8/19.\par 3/2019-CT scan of the chest/abdomen/pelvis (addended report)-when compared to 11/2018 PET/CT scan showed the largest spiculated nodule of the left upper lobe of the lung improved, prominent left pleural thickening associated with the left upper lobe mass, markedly improved, other pleural-based nodules near completely resolved. Interval resolution of pleural effusions. Marked interval improvement in mediastinal and hilar adenopathy. New lytic and sclerotic lesions of bone.\par  [de-identified] : small cell carcinoma

## 2019-04-10 NOTE — RESULTS/DATA
[FreeTextEntry1] : CT scan of the chest/abdomen/pelvis (addended report)-when compared to 11/2018 PET/CT scan showed the largest spiculated nodule of the left upper lobe of the lung improved, prominent left pleural thickening associated with the left upper lobe mass, markedly improved, other pleural-based nodules near completely resolved. Interval resolution of pleural effusions. Marked interval improvement in mediastinal and hilar adenopathy. New lytic and sclerotic lesions of bone. Stable bulging of the prostate gland.

## 2019-04-10 NOTE — ASSESSMENT
[FreeTextEntry1] : Extensive small cell lung cancer-reviewed scan results with patient and his daughter, along with treatment options. Appears to have had a mixed treatment response. With new bony lesions, favor change in therapy at this point. Recommend Nivolumab-potential side effects reviewed including but not limited to, thyroiditis, pneumonitis, colitis. Holding on addition of ipilumumab at this time in light of potential added toxicity. Interval followup imaging will then again be planned. Holding on further prostate evaluation currently, in light of advanced lung cancer.\par Patient and his daughter were given the opportunity to ask questions. Their questions have been answered to their apparent satisfaction at this time.\par

## 2019-04-15 ENCOUNTER — APPOINTMENT (OUTPATIENT)
Dept: INFUSION THERAPY | Facility: HOSPITAL | Age: 64
End: 2019-04-15

## 2019-04-15 ENCOUNTER — LABORATORY RESULT (OUTPATIENT)
Age: 64
End: 2019-04-15

## 2019-04-16 DIAGNOSIS — Z51.11 ENCOUNTER FOR ANTINEOPLASTIC CHEMOTHERAPY: ICD-10-CM

## 2019-04-28 ENCOUNTER — FORM ENCOUNTER (OUTPATIENT)
Age: 64
End: 2019-04-28

## 2019-04-29 ENCOUNTER — LABORATORY RESULT (OUTPATIENT)
Age: 64
End: 2019-04-29

## 2019-04-29 ENCOUNTER — APPOINTMENT (OUTPATIENT)
Dept: INFUSION THERAPY | Facility: HOSPITAL | Age: 64
End: 2019-04-29

## 2019-04-29 ENCOUNTER — APPOINTMENT (OUTPATIENT)
Dept: MRI IMAGING | Facility: CLINIC | Age: 64
End: 2019-04-29
Payer: COMMERCIAL

## 2019-04-29 ENCOUNTER — APPOINTMENT (OUTPATIENT)
Dept: HEMATOLOGY ONCOLOGY | Facility: CLINIC | Age: 64
End: 2019-04-29
Payer: MEDICAID

## 2019-04-29 ENCOUNTER — OUTPATIENT (OUTPATIENT)
Dept: OUTPATIENT SERVICES | Facility: HOSPITAL | Age: 64
LOS: 1 days | End: 2019-04-29
Payer: MEDICAID

## 2019-04-29 ENCOUNTER — RESULT REVIEW (OUTPATIENT)
Age: 64
End: 2019-04-29

## 2019-04-29 VITALS
OXYGEN SATURATION: 100 % | HEART RATE: 77 BPM | SYSTOLIC BLOOD PRESSURE: 134 MMHG | BODY MASS INDEX: 22.27 KG/M2 | WEIGHT: 134.48 LBS | DIASTOLIC BLOOD PRESSURE: 77 MMHG | TEMPERATURE: 97.5 F | RESPIRATION RATE: 16 BRPM

## 2019-04-29 DIAGNOSIS — Z98.890 OTHER SPECIFIED POSTPROCEDURAL STATES: Chronic | ICD-10-CM

## 2019-04-29 DIAGNOSIS — Z00.8 ENCOUNTER FOR OTHER GENERAL EXAMINATION: ICD-10-CM

## 2019-04-29 LAB
ALBUMIN SERPL ELPH-MCNC: 4.4 G/DL
ALP BLD-CCNC: 110 U/L
ALT SERPL-CCNC: 16 U/L
ANION GAP SERPL CALC-SCNC: 13 MMOL/L
AST SERPL-CCNC: 23 U/L
BASOPHILS # BLD AUTO: 0 K/UL — SIGNIFICANT CHANGE UP (ref 0–0.2)
BASOPHILS NFR BLD AUTO: 0.4 % — SIGNIFICANT CHANGE UP (ref 0–2)
BILIRUB SERPL-MCNC: 0.4 MG/DL
BUN SERPL-MCNC: 23 MG/DL
CALCIUM SERPL-MCNC: 9.7 MG/DL
CHLORIDE SERPL-SCNC: 101 MMOL/L
CO2 SERPL-SCNC: 27 MMOL/L
CREAT SERPL-MCNC: 1.51 MG/DL
EOSINOPHIL # BLD AUTO: 0.8 K/UL — HIGH (ref 0–0.5)
EOSINOPHIL NFR BLD AUTO: 9.3 % — HIGH (ref 0–6)
GLUCOSE SERPL-MCNC: 144 MG/DL
HCT VFR BLD CALC: 32.2 % — LOW (ref 39–50)
HGB BLD-MCNC: 10.9 G/DL — LOW (ref 13–17)
LYMPHOCYTES # BLD AUTO: 1.6 K/UL — SIGNIFICANT CHANGE UP (ref 1–3.3)
LYMPHOCYTES # BLD AUTO: 18.9 % — SIGNIFICANT CHANGE UP (ref 13–44)
MCHC RBC-ENTMCNC: 31.3 PG — SIGNIFICANT CHANGE UP (ref 27–34)
MCHC RBC-ENTMCNC: 33.7 G/DL — SIGNIFICANT CHANGE UP (ref 32–36)
MCV RBC AUTO: 92.9 FL — SIGNIFICANT CHANGE UP (ref 80–100)
MONOCYTES # BLD AUTO: 0.4 K/UL — SIGNIFICANT CHANGE UP (ref 0–0.9)
MONOCYTES NFR BLD AUTO: 5 % — SIGNIFICANT CHANGE UP (ref 2–14)
NEUTROPHILS # BLD AUTO: 5.5 K/UL — SIGNIFICANT CHANGE UP (ref 1.8–7.4)
NEUTROPHILS NFR BLD AUTO: 66.4 % — SIGNIFICANT CHANGE UP (ref 43–77)
PLATELET # BLD AUTO: 157 K/UL — SIGNIFICANT CHANGE UP (ref 150–400)
POTASSIUM SERPL-SCNC: 4.9 MMOL/L
PROT SERPL-MCNC: 8 G/DL
RBC # BLD: 3.47 M/UL — LOW (ref 4.2–5.8)
RBC # FLD: 16.3 % — HIGH (ref 10.3–14.5)
SODIUM SERPL-SCNC: 141 MMOL/L
TSH SERPL-ACNC: 2.79 UIU/ML
WBC # BLD: 8.3 K/UL — SIGNIFICANT CHANGE UP (ref 3.8–10.5)
WBC # FLD AUTO: 8.3 K/UL — SIGNIFICANT CHANGE UP (ref 3.8–10.5)

## 2019-04-29 PROCEDURE — 99214 OFFICE O/P EST MOD 30 MIN: CPT

## 2019-04-29 PROCEDURE — 70553 MRI BRAIN STEM W/O & W/DYE: CPT | Mod: 26

## 2019-04-29 PROCEDURE — A9585: CPT

## 2019-04-29 PROCEDURE — 70553 MRI BRAIN STEM W/O & W/DYE: CPT

## 2019-04-29 NOTE — PHYSICAL EXAM
[Normal] : affect appropriate [de-identified] : non-toxic appearing [de-identified] : decreased BS bases [de-identified] : chronic venous stasis changes; no calf tenderness; trace B/L pedal edema [de-identified] : soft, NT without palpable hepatosplenomegaly [de-identified] : s/p partial amputation left foot [de-identified] : alert, cooperative; no gross focal motor deficits

## 2019-04-29 NOTE — HISTORY OF PRESENT ILLNESS
[Disease: _____________________] : Disease: [unfilled] [T: ___] : T[unfilled] [N: ___] : N[unfilled] [M: ___] : M[unfilled] [AJCC Stage: ____] : AJCC Stage: [unfilled] [de-identified] : Patient came [place] the [place] from [facility] 9/2018. He reported an approximately one-year history of a cough. Recently had had some associated chest discomfort. He saw his primary care physician for evaluation, and chest x-ray 11/2018 showed a left upper lobe masslike opacity measuring 3.7 cm in maximum dimension. Also enlargement of the left hilum. Pleural thickening adjacent to the masslike opacity. PET/CT scan showed a hypermetabolic spiculated left upper lobe lung mass with gross involvement of the mid to upper left pleura with associated loculated effusions. Suspicious hypermetabolic regional megan disease in the left hilum and left anterior mediastinum. Hypermetabolic lytic foci in the axial skeleton suspicious for multifocal osteolytic metastatic disease. A hypermetabolic nodule noted in the left lobe of the thyroid, nonspecific.\par FB, EBUS biopsy 12/3/18 performed (Dr. Looney). Left upper lobe endobronchial lesion/mass consistent with small cell carcinoma. BAL of left upper lobe negative for malignant cells. Patient began Etoposide/Carboplatin 12/31/18, completing 4th cycle 3/8/19.\par 3/2019-CT scan of the chest/abdomen/pelvis (addended report)-when compared to 11/2018 PET/CT scan showed the largest spiculated nodule of the left upper lobe of the lung improved, prominent left pleural thickening associated with the left upper lobe mass, markedly improved, other pleural-based nodules near completely resolved. Interval resolution of pleural effusions. Marked interval improvement in mediastinal and hilar adenopathy. New lytic and sclerotic lesions of bone. 4/2019-Began Nivolumab.\par  [de-identified] : small cell carcinoma [de-identified] : +Weak, but no c/o pain or SOB. Mild cough, productive of clear phlegm. No hemoptysis.\amandeep Starts fasting next week for 30 days.\amandeep Had brain MRI today with results pending.\par Patient was accompanied today by his daughter (health care proxy) Lin.\par

## 2019-04-29 NOTE — ASSESSMENT
[FreeTextEntry1] : #1) small cell lung cancer-clinically stable-continue immunotherapy. Potential side effect profile of Nivolumab reviewed.\par #2) thyroid nodules/prostatic enlargement on recent PET/CT scan-discussed with daughter-in light of extensive small cell lung cancer, holding on further evaluation of these issues currently. Interval followup imaging planned for patient's lung cancer.\par Patient and his daughter were given the opportunity to ask questions. Their questions have been answered to their apparent satisfaction at this time.

## 2019-05-02 ENCOUNTER — OUTPATIENT (OUTPATIENT)
Dept: OUTPATIENT SERVICES | Facility: HOSPITAL | Age: 64
LOS: 1 days | Discharge: HOME | End: 2019-05-02
Payer: MEDICAID

## 2019-05-02 DIAGNOSIS — Z98.890 OTHER SPECIFIED POSTPROCEDURAL STATES: Chronic | ICD-10-CM

## 2019-05-07 ENCOUNTER — APPOINTMENT (OUTPATIENT)
Dept: RADIATION ONCOLOGY | Facility: CLINIC | Age: 64
End: 2019-05-07
Payer: MEDICAID

## 2019-05-07 VITALS
OXYGEN SATURATION: 95 % | BODY MASS INDEX: 23.12 KG/M2 | WEIGHT: 138.78 LBS | DIASTOLIC BLOOD PRESSURE: 66 MMHG | HEART RATE: 90 BPM | SYSTOLIC BLOOD PRESSURE: 107 MMHG | RESPIRATION RATE: 18 BRPM | HEIGHT: 65 IN

## 2019-05-07 DIAGNOSIS — E11.9 TYPE 2 DIABETES MELLITUS W/OUT COMPLICATIONS: ICD-10-CM

## 2019-05-07 DIAGNOSIS — I10 ESSENTIAL (PRIMARY) HYPERTENSION: ICD-10-CM

## 2019-05-07 PROCEDURE — 99204 OFFICE O/P NEW MOD 45 MIN: CPT | Mod: GC,25

## 2019-05-07 NOTE — LETTER GREETING
[Dear  ___] : Dear  [unfilled], [Please see my note below.] : Please see my note below. [Consult Letter:] : Your patient, [unfilled] was seen in my office today for consultation.

## 2019-05-07 NOTE — VITALS
[Maximal Pain Intensity: 0/10] : 0/10 [70: Cares for self; unalbe to carry on normal activity or do active work.] : 70: Cares for self; unable to carry on normal activity or do active work. [Least Pain Intensity: 0/10] : 0/10 [ECOG Performance Status: 0 - Fully active, able to carry on all pre-disease performance without restriction] : Performance Status: 0 - Fully active, able to carry on all pre-disease performance without restriction

## 2019-05-07 NOTE — REASON FOR VISIT
[Lung Cancer] : lung cancer [Family Member] : family member [Consideration of Curative Therapy] : consideration of curative therapy for

## 2019-05-09 ENCOUNTER — OUTPATIENT (OUTPATIENT)
Dept: OUTPATIENT SERVICES | Facility: HOSPITAL | Age: 64
LOS: 1 days | Discharge: HOME | End: 2019-05-09

## 2019-05-09 DIAGNOSIS — C34.90 MALIGNANT NEOPLASM OF UNSPECIFIED PART OF UNSPECIFIED BRONCHUS OR LUNG: ICD-10-CM

## 2019-05-09 DIAGNOSIS — Z98.890 OTHER SPECIFIED POSTPROCEDURAL STATES: Chronic | ICD-10-CM

## 2019-05-13 ENCOUNTER — APPOINTMENT (OUTPATIENT)
Dept: INFUSION THERAPY | Facility: HOSPITAL | Age: 64
End: 2019-05-13

## 2019-05-13 ENCOUNTER — LABORATORY RESULT (OUTPATIENT)
Age: 64
End: 2019-05-13

## 2019-05-13 ENCOUNTER — RESULT REVIEW (OUTPATIENT)
Age: 64
End: 2019-05-13

## 2019-05-13 ENCOUNTER — APPOINTMENT (OUTPATIENT)
Dept: HEMATOLOGY ONCOLOGY | Facility: CLINIC | Age: 64
End: 2019-05-13
Payer: MEDICAID

## 2019-05-13 VITALS
TEMPERATURE: 97.9 F | OXYGEN SATURATION: 100 % | WEIGHT: 138.89 LBS | HEART RATE: 69 BPM | DIASTOLIC BLOOD PRESSURE: 77 MMHG | SYSTOLIC BLOOD PRESSURE: 123 MMHG | BODY MASS INDEX: 23.11 KG/M2 | RESPIRATION RATE: 16 BRPM

## 2019-05-13 LAB
ALBUMIN SERPL ELPH-MCNC: 4 G/DL
ALP BLD-CCNC: 110 U/L
ALT SERPL-CCNC: 16 U/L
ANION GAP SERPL CALC-SCNC: 11 MMOL/L
AST SERPL-CCNC: 19 U/L
BASOPHILS # BLD AUTO: 0 K/UL — SIGNIFICANT CHANGE UP (ref 0–0.2)
BASOPHILS NFR BLD AUTO: 0.3 % — SIGNIFICANT CHANGE UP (ref 0–2)
BILIRUB SERPL-MCNC: 0.2 MG/DL
BUN SERPL-MCNC: 22 MG/DL
CALCIUM SERPL-MCNC: 9.3 MG/DL
CHLORIDE SERPL-SCNC: 103 MMOL/L
CO2 SERPL-SCNC: 26 MMOL/L
CREAT SERPL-MCNC: 1.51 MG/DL
EOSINOPHIL # BLD AUTO: 0.3 K/UL — SIGNIFICANT CHANGE UP (ref 0–0.5)
EOSINOPHIL NFR BLD AUTO: 5.1 % — SIGNIFICANT CHANGE UP (ref 0–6)
GLUCOSE SERPL-MCNC: 219 MG/DL
HCT VFR BLD CALC: 28.6 % — LOW (ref 39–50)
HGB BLD-MCNC: 10.3 G/DL — LOW (ref 13–17)
LYMPHOCYTES # BLD AUTO: 1.6 K/UL — SIGNIFICANT CHANGE UP (ref 1–3.3)
LYMPHOCYTES # BLD AUTO: 25.6 % — SIGNIFICANT CHANGE UP (ref 13–44)
MCHC RBC-ENTMCNC: 33.9 PG — SIGNIFICANT CHANGE UP (ref 27–34)
MCHC RBC-ENTMCNC: 36.1 G/DL — HIGH (ref 32–36)
MCV RBC AUTO: 94 FL — SIGNIFICANT CHANGE UP (ref 80–100)
MONOCYTES # BLD AUTO: 0.4 K/UL — SIGNIFICANT CHANGE UP (ref 0–0.9)
MONOCYTES NFR BLD AUTO: 6.2 % — SIGNIFICANT CHANGE UP (ref 2–14)
NEUTROPHILS # BLD AUTO: 4 K/UL — SIGNIFICANT CHANGE UP (ref 1.8–7.4)
NEUTROPHILS NFR BLD AUTO: 62.7 % — SIGNIFICANT CHANGE UP (ref 43–77)
PLATELET # BLD AUTO: 124 K/UL — LOW (ref 150–400)
POTASSIUM SERPL-SCNC: 5.1 MMOL/L
PROT SERPL-MCNC: 7 G/DL
RBC # BLD: 3.04 M/UL — LOW (ref 4.2–5.8)
RBC # FLD: 15 % — HIGH (ref 10.3–14.5)
SODIUM SERPL-SCNC: 139 MMOL/L
TSH SERPL-ACNC: 2.51 UIU/ML
WBC # BLD: 6.4 K/UL — SIGNIFICANT CHANGE UP (ref 3.8–10.5)
WBC # FLD AUTO: 6.4 K/UL — SIGNIFICANT CHANGE UP (ref 3.8–10.5)

## 2019-05-13 PROCEDURE — 99214 OFFICE O/P EST MOD 30 MIN: CPT

## 2019-05-13 RX ORDER — CARBOPLATIN 10 MG/ML
150 INJECTION, SOLUTION INTRAVENOUS
Refills: 0 | Status: DISCONTINUED | COMMUNITY
Start: 2018-12-21 | End: 2019-05-13

## 2019-05-13 RX ORDER — PEGFILGRASTIM 6 MG/0.6ML
6 KIT SUBCUTANEOUS
Refills: 0 | Status: DISCONTINUED | COMMUNITY
Start: 2019-02-05 | End: 2019-05-13

## 2019-05-13 NOTE — PHYSICAL EXAM
[Normal] : affect appropriate [de-identified] : decreased BS bases [de-identified] : non-toxic appearing [de-identified] : soft, NT without palpable hepatosplenomegaly [de-identified] : chronic venous stasis changes; no calf tenderness; trace B/L pedal edema [de-identified] : alert, cooperative; no gross focal motor deficits [de-identified] : s/p partial amputation left foot

## 2019-05-13 NOTE — HISTORY OF PRESENT ILLNESS
[Disease: _____________________] : Disease: [unfilled] [T: ___] : T[unfilled] [N: ___] : N[unfilled] [M: ___] : M[unfilled] [AJCC Stage: ____] : AJCC Stage: [unfilled] [de-identified] : Patient came [place] the [place] from [facility] 9/2018. He reported an approximately one-year history of a cough. Recently had had some associated chest discomfort. He saw his primary care physician for evaluation, and chest x-ray 11/2018 showed a left upper lobe masslike opacity measuring 3.7 cm in maximum dimension. Also enlargement of the left hilum. Pleural thickening adjacent to the masslike opacity. PET/CT scan showed a hypermetabolic spiculated left upper lobe lung mass with gross involvement of the mid to upper left pleura with associated loculated effusions. Suspicious hypermetabolic regional megan disease in the left hilum and left anterior mediastinum. Hypermetabolic lytic foci in the axial skeleton suspicious for multifocal osteolytic metastatic disease. A hypermetabolic nodule noted in the left lobe of the thyroid, nonspecific.\par FB, EBUS biopsy 12/3/18 performed (Dr. Looney). Left upper lobe endobronchial lesion/mass consistent with small cell carcinoma. BAL of left upper lobe negative for malignant cells. Patient began Etoposide/Carboplatin 12/31/18, completing 4th cycle 3/8/19.\par 3/2019-CT scan of the chest/abdomen/pelvis (addended report)-when compared to 11/2018 PET/CT scan showed the largest spiculated nodule of the left upper lobe of the lung improved, prominent left pleural thickening associated with the left upper lobe mass, markedly improved, other pleural-based nodules near completely resolved. Interval resolution of pleural effusions. Marked interval improvement in mediastinal and hilar adenopathy. New lytic and sclerotic lesions of bone. 4/2019-Began Nivolumab.\par  [de-identified] : small cell carcinoma [de-identified] : Saw RT MD and RT planned for brain lesion. No c/o H/A, change in vision, N/V. No fevers. No hemoptysis.\par +Mild cough without acute change.\par Patient was accompanied today by his daughter (health care proxy) Lin.\par

## 2019-05-13 NOTE — ASSESSMENT
[FreeTextEntry1] : Extensive SCLC to bone and brain-patient to proceed with RT per radiation oncology. He consents to continue Nivolumab systemic therapy (potential side effect profile reviewed). Explained current disease extent and reviewed plans of care.\par Anemia and thrombocytopenia noted-no overt bleeding noted clinically at present. Continue to monitor CBC with diff.\par Patient and his daughter were given the opportunity to ask questions. Their questions have been answered to their satisfaction at this time.

## 2019-05-14 ENCOUNTER — OUTPATIENT (OUTPATIENT)
Dept: OUTPATIENT SERVICES | Facility: HOSPITAL | Age: 64
LOS: 1 days | Discharge: HOME | End: 2019-05-14
Payer: MEDICAID

## 2019-05-14 ENCOUNTER — FORM ENCOUNTER (OUTPATIENT)
Age: 64
End: 2019-05-14

## 2019-05-14 DIAGNOSIS — Z51.11 ENCOUNTER FOR ANTINEOPLASTIC CHEMOTHERAPY: ICD-10-CM

## 2019-05-14 DIAGNOSIS — R11.2 NAUSEA WITH VOMITING, UNSPECIFIED: ICD-10-CM

## 2019-05-14 DIAGNOSIS — Z98.890 OTHER SPECIFIED POSTPROCEDURAL STATES: Chronic | ICD-10-CM

## 2019-05-14 PROCEDURE — 77263 THER RADIOLOGY TX PLNG CPLX: CPT

## 2019-05-14 NOTE — REVIEW OF SYSTEMS
[Cough] : cough [Constipation] : constipation [Negative] : Heme/Lymph [Constipation: Grade 0] : Constipation: Grade 0 [Diarrhea: Grade 0] : Diarrhea: Grade 0 [Fatigue: Grade 1 - Fatigue relieved by rest] : Fatigue: Grade 1 - Fatigue relieved by rest [Cough: Grade 1 - Mild symptoms; nonprescription intervention indicated] : Cough: Grade 1 - Mild symptoms; nonprescription intervention indicated [Dyspnea: Grade 0] : Dyspnea: Grade 0 [FreeTextEntry6] : mucus-clear

## 2019-05-14 NOTE — PHYSICAL EXAM
[Sclera] : the sclera and conjunctiva were normal [Outer Ear] : the ears and nose were normal in appearance [Abdomen Soft] : soft [Nondistended] : nondistended [Normal] : oriented to person, place and time, the affect was normal, the mood was normal and not anxious [de-identified] : walks with a cane. mild tenderness to palpation in lower back

## 2019-05-14 NOTE — HISTORY OF PRESENT ILLNESS
[FreeTextEntry1] : 64 yo man with metastatic small cell lung cancer with pleural disease and diffuse osseous mets, now presenting with a single brain metastasis.\par \par 63-year-old man who came [place] the [place] in [place] 2018 and had reported a one year history of cough and had mild associated chest discomfort. Chest x-ray 11/2018 showed left upper lobe masslike opacity measuring 2.7 cm as well as enlarged left hilum. There were pleural thickening adjacent to the masslike opacity. PET scan showed hypermetabolic spiculated left upper lobe lung mass with gross involvement of the mid upper left pleura associated loculated effusion. Suspicious hypermetabolic regional node in the left hilum and left anterior mediastinum and, lytic lesions in the axial skeleton suspicious for multifocal disease. \par \par 12/30/18 by Dr. Hawthorne : EBUS of the left upper lobe lesion showed small cell carcinoma. BAL of left upper lobe negative for malignancy. He started etoposide/carboplatin on 12/31/18, completed 4 cycles on 3/8/19.\par \par March 2019 CT scan showed partial response in the largest spiculated nodule in the left upper lobe, improved left pleural thickening and near resolution of the pleural-based nodules. There were improvement in mediastinal and hilar adenopathy. There are new lytic and sclerotic lesions in the bones (left sacrum, pubic symphysis, Lumbar vertebral body, posterior right sixth rib, posterior lateral left 4th rib. Sclerotic foci in T2 and L4 vertebral body), He started the Nivolumab on 4/2019\par \par MRI 4/29/19 showed new tiny enhancing lesion in the left frontal subcortical region measuring 2.1 mm.\par \par Overall he is doing okay. He denies shortness of breath. He reports cough and feels overall mild weakness. He has no headache, vision changes, nausea, dizziness or vomiting. He walks with a cane after a partial foot amputation caused by DM. He reports recent onset of mild lower back pain

## 2019-05-15 ENCOUNTER — APPOINTMENT (OUTPATIENT)
Dept: MRI IMAGING | Facility: IMAGING CENTER | Age: 64
End: 2019-05-15

## 2019-05-15 ENCOUNTER — APPOINTMENT (OUTPATIENT)
Dept: NEUROSURGERY | Facility: CLINIC | Age: 64
End: 2019-05-15

## 2019-05-15 ENCOUNTER — OUTPATIENT (OUTPATIENT)
Dept: OUTPATIENT SERVICES | Facility: HOSPITAL | Age: 64
LOS: 1 days | End: 2019-05-15
Payer: MEDICAID

## 2019-05-15 DIAGNOSIS — C79.31 SECONDARY MALIGNANT NEOPLASM OF BRAIN: ICD-10-CM

## 2019-05-15 DIAGNOSIS — Z98.890 OTHER SPECIFIED POSTPROCEDURAL STATES: Chronic | ICD-10-CM

## 2019-05-15 PROCEDURE — 76498 UNLISTED MR PROCEDURE: CPT

## 2019-05-16 ENCOUNTER — APPOINTMENT (OUTPATIENT)
Dept: NEUROSURGERY | Facility: CLINIC | Age: 64
End: 2019-05-16
Payer: MEDICAID

## 2019-05-16 PROCEDURE — 77295 3-D RADIOTHERAPY PLAN: CPT | Mod: 26

## 2019-05-16 PROCEDURE — 77432 STEREOTACTIC RADIATION TRMT: CPT

## 2019-05-16 PROCEDURE — 77334 RADIATION TREATMENT AID(S): CPT | Mod: 26

## 2019-05-16 PROCEDURE — 61796 SRS CRANIAL LESION SIMPLE: CPT

## 2019-05-16 PROCEDURE — 77300 RADIATION THERAPY DOSE PLAN: CPT | Mod: 26

## 2019-05-16 PROCEDURE — 61800 APPLY SRS HEADFRAME ADD-ON: CPT

## 2019-05-28 ENCOUNTER — LABORATORY RESULT (OUTPATIENT)
Age: 64
End: 2019-05-28

## 2019-05-28 ENCOUNTER — APPOINTMENT (OUTPATIENT)
Dept: INFUSION THERAPY | Facility: HOSPITAL | Age: 64
End: 2019-05-28

## 2019-05-28 ENCOUNTER — RESULT REVIEW (OUTPATIENT)
Age: 64
End: 2019-05-28

## 2019-05-28 ENCOUNTER — APPOINTMENT (OUTPATIENT)
Dept: HEMATOLOGY ONCOLOGY | Facility: CLINIC | Age: 64
End: 2019-05-28
Payer: MEDICAID

## 2019-05-28 VITALS
TEMPERATURE: 98.3 F | SYSTOLIC BLOOD PRESSURE: 119 MMHG | BODY MASS INDEX: 23 KG/M2 | OXYGEN SATURATION: 97 % | WEIGHT: 138.23 LBS | HEART RATE: 76 BPM | DIASTOLIC BLOOD PRESSURE: 65 MMHG | RESPIRATION RATE: 16 BRPM

## 2019-05-28 LAB
BASOPHILS # BLD AUTO: 0 K/UL — SIGNIFICANT CHANGE UP (ref 0–0.2)
BASOPHILS NFR BLD AUTO: 0.3 % — SIGNIFICANT CHANGE UP (ref 0–2)
EOSINOPHIL # BLD AUTO: 0.3 K/UL — SIGNIFICANT CHANGE UP (ref 0–0.5)
EOSINOPHIL NFR BLD AUTO: 4.2 % — SIGNIFICANT CHANGE UP (ref 0–6)
HCT VFR BLD CALC: 28.7 % — LOW (ref 39–50)
HGB BLD-MCNC: 10.2 G/DL — LOW (ref 13–17)
LYMPHOCYTES # BLD AUTO: 1.9 K/UL — SIGNIFICANT CHANGE UP (ref 1–3.3)
LYMPHOCYTES # BLD AUTO: 31.3 % — SIGNIFICANT CHANGE UP (ref 13–44)
MCHC RBC-ENTMCNC: 33.8 PG — SIGNIFICANT CHANGE UP (ref 27–34)
MCHC RBC-ENTMCNC: 35.5 G/DL — SIGNIFICANT CHANGE UP (ref 32–36)
MCV RBC AUTO: 95.4 FL — SIGNIFICANT CHANGE UP (ref 80–100)
MONOCYTES # BLD AUTO: 0.4 K/UL — SIGNIFICANT CHANGE UP (ref 0–0.9)
MONOCYTES NFR BLD AUTO: 7.4 % — SIGNIFICANT CHANGE UP (ref 2–14)
NEUTROPHILS # BLD AUTO: 3.5 K/UL — SIGNIFICANT CHANGE UP (ref 1.8–7.4)
NEUTROPHILS NFR BLD AUTO: 56.9 % — SIGNIFICANT CHANGE UP (ref 43–77)
PLATELET # BLD AUTO: 136 K/UL — LOW (ref 150–400)
RBC # BLD: 3.01 M/UL — LOW (ref 4.2–5.8)
RBC # FLD: 14.8 % — HIGH (ref 10.3–14.5)
WBC # BLD: 6.1 K/UL — SIGNIFICANT CHANGE UP (ref 3.8–10.5)
WBC # FLD AUTO: 6.1 K/UL — SIGNIFICANT CHANGE UP (ref 3.8–10.5)

## 2019-05-28 PROCEDURE — 99214 OFFICE O/P EST MOD 30 MIN: CPT

## 2019-05-28 RX ORDER — ETOPOSIDE 20 MG/ML
100 INJECTION INTRAVENOUS
Refills: 0 | Status: DISCONTINUED | COMMUNITY
Start: 2018-12-21 | End: 2019-05-28

## 2019-05-28 NOTE — PHYSICAL EXAM
[Normal] : affect appropriate [de-identified] : non-toxic appearing [de-identified] : decreased BS bases [de-identified] : chronic venous stasis changes; no calf tenderness; trace B/L pedal edema [de-identified] : soft, NT without palpable hepatosplenomegaly [de-identified] : papery thin; +ecchymosis dorsum left hand [de-identified] : s/p partial amputation left foot [de-identified] : alert, cooperative; no gross focal motor deficits

## 2019-05-28 NOTE — ASSESSMENT
[FreeTextEntry1] : Extensive small cell lung cancer to bone, and brain-status post SRS of solitary brain metastasis. Patient continues with Nivolumab systemic therapy (potential side effect profile reviewed)-he consents to treatment.\par Patient and his daughter were given the opportunity to ask questions. Their questions have been answered to their satisfaction at this time.

## 2019-05-28 NOTE — REVIEW OF SYSTEMS
[Easy Bruising] : a tendency for easy bruising [Negative] : Allergic/Immunologic [Shortness Of Breath] : no shortness of breath

## 2019-05-28 NOTE — HISTORY OF PRESENT ILLNESS
[Disease: _____________________] : Disease: [unfilled] [T: ___] : T[unfilled] [N: ___] : N[unfilled] [M: ___] : M[unfilled] [AJCC Stage: ____] : AJCC Stage: [unfilled] [de-identified] : Patient came to the [place] from [facility] 9/2018. He reported an approximately one-year history of a cough. Recently had had some associated chest discomfort. He saw his primary care physician for evaluation, and chest x-ray 11/2018 showed a left upper lobe masslike opacity measuring 3.7 cm in maximum dimension. Also enlargement of the left hilum. Pleural thickening adjacent to the masslike opacity. PET/CT scan showed a hypermetabolic spiculated left upper lobe lung mass with gross involvement of the mid to upper left pleura with associated loculated effusions. Suspicious hypermetabolic regional megan disease in the left hilum and left anterior mediastinum. Hypermetabolic lytic foci in the axial skeleton suspicious for multifocal osteolytic metastatic disease. A hypermetabolic nodule noted in the left lobe of the thyroid, nonspecific.\par FB, EBUS biopsy 12/3/18 performed (Dr. Looney). Left upper lobe endobronchial lesion/mass consistent with small cell carcinoma. BAL of left upper lobe negative for malignant cells. Patient began Etoposide/Carboplatin 12/31/18, completing 4th cycle 3/8/19.\par 3/2019-CT scan of the chest/abdomen/pelvis (addended report)-when compared to 11/2018 PET/CT scan showed the largest spiculated nodule of the left upper lobe of the lung improved, prominent left pleural thickening associated with the left upper lobe mass, markedly improved, other pleural-based nodules near completely resolved. Interval resolution of pleural effusions. Marked interval improvement in mediastinal and hilar adenopathy. New lytic and sclerotic lesions of bone. 4/2019-Began Nivolumab.\par  [de-identified] : small cell carcinoma [de-identified] : S/P SRS for brain met.\par No pain or N/V or H/A. +Mild intermittent cough, non-productive. No associated CP, SOB or hemoptysis.\par Patient was accompanied today by his daughter (health care proxy) Lin.\par

## 2019-06-06 ENCOUNTER — OUTPATIENT (OUTPATIENT)
Dept: OUTPATIENT SERVICES | Facility: HOSPITAL | Age: 64
LOS: 1 days | Discharge: HOME | End: 2019-06-06

## 2019-06-06 DIAGNOSIS — C34.90 MALIGNANT NEOPLASM OF UNSPECIFIED PART OF UNSPECIFIED BRONCHUS OR LUNG: ICD-10-CM

## 2019-06-06 DIAGNOSIS — Z98.890 OTHER SPECIFIED POSTPROCEDURAL STATES: Chronic | ICD-10-CM

## 2019-06-11 ENCOUNTER — RESULT REVIEW (OUTPATIENT)
Age: 64
End: 2019-06-11

## 2019-06-11 ENCOUNTER — LABORATORY RESULT (OUTPATIENT)
Age: 64
End: 2019-06-11

## 2019-06-11 ENCOUNTER — APPOINTMENT (OUTPATIENT)
Dept: INFUSION THERAPY | Facility: HOSPITAL | Age: 64
End: 2019-06-11

## 2019-06-11 ENCOUNTER — APPOINTMENT (OUTPATIENT)
Dept: HEMATOLOGY ONCOLOGY | Facility: CLINIC | Age: 64
End: 2019-06-11
Payer: MEDICAID

## 2019-06-11 VITALS
RESPIRATION RATE: 16 BRPM | OXYGEN SATURATION: 100 % | TEMPERATURE: 97.7 F | HEART RATE: 71 BPM | WEIGHT: 141.09 LBS | DIASTOLIC BLOOD PRESSURE: 70 MMHG | BODY MASS INDEX: 23.48 KG/M2 | SYSTOLIC BLOOD PRESSURE: 129 MMHG

## 2019-06-11 DIAGNOSIS — K59.00 CONSTIPATION, UNSPECIFIED: ICD-10-CM

## 2019-06-11 LAB
BASOPHILS # BLD AUTO: 0 K/UL — SIGNIFICANT CHANGE UP (ref 0–0.2)
BASOPHILS NFR BLD AUTO: 0.3 % — SIGNIFICANT CHANGE UP (ref 0–2)
EOSINOPHIL # BLD AUTO: 0.3 K/UL — SIGNIFICANT CHANGE UP (ref 0–0.5)
EOSINOPHIL NFR BLD AUTO: 5.2 % — SIGNIFICANT CHANGE UP (ref 0–6)
HCT VFR BLD CALC: 32.2 % — LOW (ref 39–50)
HGB BLD-MCNC: 10.8 G/DL — LOW (ref 13–17)
LYMPHOCYTES # BLD AUTO: 1.9 K/UL — SIGNIFICANT CHANGE UP (ref 1–3.3)
LYMPHOCYTES # BLD AUTO: 30.7 % — SIGNIFICANT CHANGE UP (ref 13–44)
MCHC RBC-ENTMCNC: 32.1 PG — SIGNIFICANT CHANGE UP (ref 27–34)
MCHC RBC-ENTMCNC: 33.5 G/DL — SIGNIFICANT CHANGE UP (ref 32–36)
MCV RBC AUTO: 95.7 FL — SIGNIFICANT CHANGE UP (ref 80–100)
MONOCYTES # BLD AUTO: 0.5 K/UL — SIGNIFICANT CHANGE UP (ref 0–0.9)
MONOCYTES NFR BLD AUTO: 8.6 % — SIGNIFICANT CHANGE UP (ref 2–14)
NEUTROPHILS # BLD AUTO: 3.3 K/UL — SIGNIFICANT CHANGE UP (ref 1.8–7.4)
NEUTROPHILS NFR BLD AUTO: 55.2 % — SIGNIFICANT CHANGE UP (ref 43–77)
PLATELET # BLD AUTO: 141 K/UL — LOW (ref 150–400)
RBC # BLD: 3.36 M/UL — LOW (ref 4.2–5.8)
RBC # FLD: 15.1 % — HIGH (ref 10.3–14.5)
WBC # BLD: 6 K/UL — SIGNIFICANT CHANGE UP (ref 3.8–10.5)
WBC # FLD AUTO: 6 K/UL — SIGNIFICANT CHANGE UP (ref 3.8–10.5)

## 2019-06-11 PROCEDURE — 99214 OFFICE O/P EST MOD 30 MIN: CPT

## 2019-06-11 RX ORDER — ALLOPURINOL 300 MG/1
300 TABLET ORAL
Qty: 30 | Refills: 2 | Status: DISCONTINUED | COMMUNITY
Start: 2018-12-21 | End: 2019-06-11

## 2019-06-11 RX ORDER — DOCUSATE SODIUM 100 MG/1
100 CAPSULE ORAL TWICE DAILY
Qty: 60 | Refills: 0 | Status: ACTIVE | COMMUNITY
Start: 2019-06-11 | End: 1900-01-01

## 2019-06-11 NOTE — ASSESSMENT
[FreeTextEntry1] : Patient with extensive small cell lung cancer, metastatic to bone and brain. Status post SRS for solitary brain metastasis. Patient consents to continue Nivolumab systemic therapy.\par Patient and his daughter were given the opportunity to ask questions. Their questions have been answered to their apparent satisfaction at this time.

## 2019-06-11 NOTE — HISTORY OF PRESENT ILLNESS
[Disease: _____________________] : Disease: [unfilled] [N: ___] : N[unfilled] [T: ___] : T[unfilled] [M: ___] : M[unfilled] [AJCC Stage: ____] : AJCC Stage: [unfilled] [de-identified] : Patient came to the [place] from [facility] 9/2018. He reported an approximately one-year history of a cough. Recently had had some associated chest discomfort. He saw his primary care physician for evaluation, and chest x-ray 11/2018 showed a left upper lobe masslike opacity measuring 3.7 cm in maximum dimension. Also enlargement of the left hilum. Pleural thickening adjacent to the masslike opacity. PET/CT scan showed a hypermetabolic spiculated left upper lobe lung mass with gross involvement of the mid to upper left pleura with associated loculated effusions. Suspicious hypermetabolic regional megan disease in the left hilum and left anterior mediastinum. Hypermetabolic lytic foci in the axial skeleton suspicious for multifocal osteolytic metastatic disease. A hypermetabolic nodule noted in the left lobe of the thyroid, nonspecific.\par FB, EBUS biopsy 12/3/18 performed (Dr. Looney). Left upper lobe endobronchial lesion/mass consistent with small cell carcinoma. BAL of left upper lobe negative for malignant cells. Patient began Etoposide/Carboplatin 12/31/18, completing 4th cycle 3/8/19.\par 3/2019-CT scan of the chest/abdomen/pelvis (addended report)-when compared to 11/2018 PET/CT scan showed the largest spiculated nodule of the left upper lobe of the lung improved, prominent left pleural thickening associated with the left upper lobe mass, markedly improved, other pleural-based nodules near completely resolved. Interval resolution of pleural effusions. Marked interval improvement in mediastinal and hilar adenopathy. New lytic and sclerotic lesions of bone. 4/2019-Began Nivolumab.\par  [de-identified] : small cell carcinoma [de-identified] : Generally feeling well.\par [place].\par No pain or N/V or H/A. +Mild intermittent cough, non-productive. No associated CP, SOB or hemoptysis.\par Patient was accompanied today by his daughter (health care proxy) Lin.\par

## 2019-06-11 NOTE — PHYSICAL EXAM
[Normal] : affect appropriate [de-identified] : decreased BS bases [de-identified] : non-toxic appearing [de-identified] : soft, NT without palpable hepatosplenomegaly [de-identified] : chronic venous stasis changes; no calf tenderness; trace B/L pedal edema [de-identified] : s/p partial amputation left foot [de-identified] : alert, cooperative; no gross focal motor deficits [de-identified] : papery thin; dry

## 2019-06-11 NOTE — REVIEW OF SYSTEMS
[Negative] : Allergic/Immunologic [Abdominal Pain] : no abdominal pain [Vomiting] : no vomiting [FreeTextEntry7] : occasional constipation [de-identified] : occasional back pruritis-no associated rash

## 2019-06-12 DIAGNOSIS — Z51.11 ENCOUNTER FOR ANTINEOPLASTIC CHEMOTHERAPY: ICD-10-CM

## 2019-06-20 ENCOUNTER — OTHER (OUTPATIENT)
Age: 64
End: 2019-06-20

## 2019-06-27 ENCOUNTER — APPOINTMENT (OUTPATIENT)
Dept: INFUSION THERAPY | Facility: HOSPITAL | Age: 64
End: 2019-06-27

## 2019-06-27 ENCOUNTER — APPOINTMENT (OUTPATIENT)
Dept: HEMATOLOGY ONCOLOGY | Facility: CLINIC | Age: 64
End: 2019-06-27
Payer: MEDICAID

## 2019-06-27 VITALS
HEART RATE: 76 BPM | TEMPERATURE: 98.6 F | SYSTOLIC BLOOD PRESSURE: 123 MMHG | WEIGHT: 139.55 LBS | DIASTOLIC BLOOD PRESSURE: 75 MMHG | RESPIRATION RATE: 15 BRPM | BODY MASS INDEX: 23.22 KG/M2 | OXYGEN SATURATION: 98 %

## 2019-06-27 PROCEDURE — 99214 OFFICE O/P EST MOD 30 MIN: CPT

## 2019-06-27 RX ORDER — ACETAMINOPHEN 500 MG/1
500 TABLET, FILM COATED ORAL
Qty: 40 | Refills: 0 | Status: DISCONTINUED | COMMUNITY
Start: 2019-01-21

## 2019-06-27 RX ORDER — ACETAMINOPHEN 650 MG/1
650 TABLET, FILM COATED, EXTENDED RELEASE ORAL
Qty: 60 | Refills: 0 | Status: DISCONTINUED | COMMUNITY
Start: 2019-04-01

## 2019-06-27 RX ORDER — POLYVINYL ALCOHOL 14 MG/ML
1.4 SOLUTION/ DROPS OPHTHALMIC
Qty: 15 | Refills: 0 | Status: DISCONTINUED | COMMUNITY
Start: 2019-02-28

## 2019-06-27 RX ORDER — NAPROXEN 500 MG/1
500 TABLET ORAL
Qty: 60 | Refills: 0 | Status: DISCONTINUED | COMMUNITY
Start: 2019-01-05

## 2019-06-27 RX ORDER — BACITRACIN 500 [IU]/G
500 OINTMENT TOPICAL
Qty: 28 | Refills: 0 | Status: DISCONTINUED | COMMUNITY
Start: 2019-01-11

## 2019-06-27 RX ORDER — PEN NEEDLE, DIABETIC 31 G X1/4"
33G X 4 MM NEEDLE, DISPOSABLE MISCELLANEOUS
Qty: 30 | Refills: 0 | Status: DISCONTINUED | COMMUNITY
Start: 2019-04-01

## 2019-06-27 NOTE — ASSESSMENT
[FreeTextEntry1] : Extensive small cell lung cancer, metastatic to bone and brain. Status post SRS for solitary brain metastasis. Lab work reviewed. With persistent elevated creatinine, plan to hold Nivolumab today and give patient IV fluid hydration. May need to give course of steroids pending course, and patient to follow up with PCP for diabetes management. F/U lab work to be done. Will obtain restaging CT scans as well, for further treatment decisions.\par Patient and his daughter were given the opportunity to ask questions. Their questions have been answered to their satisfaction at this time.

## 2019-06-27 NOTE — HISTORY OF PRESENT ILLNESS
[Disease: _____________________] : Disease: [unfilled] [T: ___] : T[unfilled] [N: ___] : N[unfilled] [M: ___] : M[unfilled] [AJCC Stage: ____] : AJCC Stage: [unfilled] [de-identified] : Patient came to the [place] from [facility] 9/2018. He reported an approximately one-year history of a cough. Recently had had some associated chest discomfort. He saw his primary care physician for evaluation, and chest x-ray 11/2018 showed a left upper lobe masslike opacity measuring 3.7 cm in maximum dimension. Also enlargement of the left hilum. Pleural thickening adjacent to the masslike opacity. PET/CT scan showed a hypermetabolic spiculated left upper lobe lung mass with gross involvement of the mid to upper left pleura with associated loculated effusions. Suspicious hypermetabolic regional megan disease in the left hilum and left anterior mediastinum. Hypermetabolic lytic foci in the axial skeleton suspicious for multifocal osteolytic metastatic disease. A hypermetabolic nodule noted in the left lobe of the thyroid, nonspecific.\par FB, EBUS biopsy 12/3/18 performed (Dr. Looney). Left upper lobe endobronchial lesion/mass consistent with small cell carcinoma. BAL of left upper lobe negative for malignant cells. Patient began Etoposide/Carboplatin 12/31/18, completing 4th cycle 3/8/19.\par 3/2019-CT scan of the chest/abdomen/pelvis (addended report)-when compared to 11/2018 PET/CT scan showed the largest spiculated nodule of the left upper lobe of the lung improved, prominent left pleural thickening associated with the left upper lobe mass, markedly improved, other pleural-based nodules near completely resolved. Interval resolution of pleural effusions. Marked interval improvement in mediastinal and hilar adenopathy. New lytic and sclerotic lesions of bone. 4/2019-Began Nivolumab.\par  [de-identified] : small cell carcinoma [de-identified] : Main complaint is fatigue.\par No pain or N/V or H/A. +Mild intermittent cough without acute change, non-productive. No associated CP, SOB or hemoptysis.\par Patient was accompanied today by his daughter (health care proxy) Lin.\par

## 2019-06-27 NOTE — PHYSICAL EXAM
[Normal] : affect appropriate [de-identified] : non-toxic appearing [de-identified] : decreased BS bases [de-identified] : chronic venous stasis changes; no calf tenderness; trace B/L pedal edema [de-identified] : soft, NT without palpable hepatosplenomegaly [de-identified] : s/p partial amputation left foot [de-identified] : papery thin; dry [de-identified] : alert, cooperative; no gross focal motor deficits

## 2019-06-28 DIAGNOSIS — E86.0 DEHYDRATION: ICD-10-CM

## 2019-07-08 ENCOUNTER — FORM ENCOUNTER (OUTPATIENT)
Age: 64
End: 2019-07-08

## 2019-07-09 ENCOUNTER — OUTPATIENT (OUTPATIENT)
Dept: OUTPATIENT SERVICES | Facility: HOSPITAL | Age: 64
LOS: 1 days | End: 2019-07-09
Payer: MEDICAID

## 2019-07-09 ENCOUNTER — APPOINTMENT (OUTPATIENT)
Dept: CT IMAGING | Facility: CLINIC | Age: 64
End: 2019-07-09
Payer: MEDICAID

## 2019-07-09 DIAGNOSIS — Z00.8 ENCOUNTER FOR OTHER GENERAL EXAMINATION: ICD-10-CM

## 2019-07-09 DIAGNOSIS — Z98.890 OTHER SPECIFIED POSTPROCEDURAL STATES: Chronic | ICD-10-CM

## 2019-07-09 PROCEDURE — 74176 CT ABD & PELVIS W/O CONTRAST: CPT

## 2019-07-09 PROCEDURE — 74176 CT ABD & PELVIS W/O CONTRAST: CPT | Mod: 26

## 2019-07-09 PROCEDURE — 71250 CT THORAX DX C-: CPT | Mod: 26

## 2019-07-09 PROCEDURE — 71250 CT THORAX DX C-: CPT

## 2019-07-11 ENCOUNTER — APPOINTMENT (OUTPATIENT)
Dept: INFUSION THERAPY | Facility: HOSPITAL | Age: 64
End: 2019-07-11

## 2019-07-16 ENCOUNTER — OUTPATIENT (OUTPATIENT)
Dept: OUTPATIENT SERVICES | Facility: HOSPITAL | Age: 64
LOS: 1 days | Discharge: HOME | End: 2019-07-16
Payer: MEDICAID

## 2019-07-16 DIAGNOSIS — C34.90 MALIGNANT NEOPLASM OF UNSPECIFIED PART OF UNSPECIFIED BRONCHUS OR LUNG: ICD-10-CM

## 2019-07-16 DIAGNOSIS — Z98.890 OTHER SPECIFIED POSTPROCEDURAL STATES: Chronic | ICD-10-CM

## 2019-07-18 ENCOUNTER — APPOINTMENT (OUTPATIENT)
Dept: HEMATOLOGY ONCOLOGY | Facility: CLINIC | Age: 64
End: 2019-07-18
Payer: MEDICAID

## 2019-07-18 VITALS
DIASTOLIC BLOOD PRESSURE: 79 MMHG | RESPIRATION RATE: 16 BRPM | BODY MASS INDEX: 23.37 KG/M2 | HEART RATE: 79 BPM | WEIGHT: 140.43 LBS | TEMPERATURE: 97.8 F | OXYGEN SATURATION: 96 % | SYSTOLIC BLOOD PRESSURE: 126 MMHG

## 2019-07-18 PROCEDURE — 99215 OFFICE O/P EST HI 40 MIN: CPT

## 2019-07-18 RX ORDER — NIVOLUMAB 10 MG/ML
100 INJECTION INTRAVENOUS
Refills: 0 | Status: DISCONTINUED | COMMUNITY
Start: 2019-05-13 | End: 2019-07-18

## 2019-07-18 RX ORDER — GEMCITABINE HYDROCHLORIDE 1 G/1
1 INJECTION, POWDER, LYOPHILIZED, FOR SOLUTION INTRAVENOUS
Refills: 0 | Status: ACTIVE | COMMUNITY
Start: 2019-07-18

## 2019-07-18 NOTE — RESULTS/DATA
[FreeTextEntry1] : CT scan chest/abdomen/pelvis-new left upper lobe satellite nodules with increased left mediastinal and left hilar adenopathy, consistent with disease progression. Interval pathologic fracture of right posterior sixth rib, otherwise stable osseous metastases.

## 2019-07-18 NOTE — CONSULT LETTER
[Dear  ___] : Dear  [unfilled], [Courtesy Letter:] : I had the pleasure of seeing your patient, [unfilled], in my office today. [Please see my note below.] : Please see my note below. [Consult Closing:] : Thank you very much for allowing me to participate in the care of this patient.  If you have any questions, please do not hesitate to contact me. [Sincerely,] : Sincerely, [FreeTextEntry3] : Joseline Thao M.D.

## 2019-07-18 NOTE — ASSESSMENT
[FreeTextEntry1] : Patient with extensive small cell lung cancer, metastatic to bone, and brain. Discussed with patient/daughter CAT scan results in detail. Explained gradually progressive disease, incurability, and palliative treatment intent. Discussed treatment options at this point, including palliative care/best supportive care/hospice. Patient/daughter wish to continue with treatment at this time. Potential benefits versus side effects of various chemotherapy options discussed. I am concerned regarding the potential toxicity of topotecan/irinotecan/taxanes in this patient with refractory disease, currently. Suggest he consider monotherapy with Gemzar for now, with interval f/u imaging, considering neuropathy/QOL in decision making. Potential side effects of Gemzar reviewed including but not limited to fatigue, nausea, myelosuppression. Patient agrees to this approach.\par I have encouraged patient's daughter to speak with patient's other children regarding the serious nature of patient's illness. She expressed her understanding.\par Patient and his daughter were given the opportunity to ask questions. Their questions have been answered at this time.

## 2019-07-18 NOTE — REASON FOR VISIT
[Follow-Up Visit] : a follow-up [Family Member] : family member [[employer] ] : provided by [employer]   [FreeTextEntry1] : [id number] [FreeTextEntry2] : Niraj

## 2019-07-18 NOTE — PHYSICAL EXAM
[Normal] : affect appropriate [de-identified] : non-toxic appearing [de-identified] : decreased BS bases [de-identified] : chronic venous stasis changes; no calf tenderness; trace B/L pedal edema [de-identified] : soft, NT without palpable hepatosplenomegaly [de-identified] : s/p partial amputation left foot [de-identified] : papery thin; dry [de-identified] : alert, cooperative; no gross focal motor deficits

## 2019-07-18 NOTE — HISTORY OF PRESENT ILLNESS
[Disease: _____________________] : Disease: [unfilled] [T: ___] : T[unfilled] [N: ___] : N[unfilled] [M: ___] : M[unfilled] [AJCC Stage: ____] : AJCC Stage: [unfilled] [de-identified] : Patient came to the [place] from [facility] 9/2018. He reported an approximately one-year history of a cough. Recently had had some associated chest discomfort. He saw his primary care physician for evaluation, and chest x-ray 11/2018 showed a left upper lobe masslike opacity measuring 3.7 cm in maximum dimension. Also enlargement of the left hilum. Pleural thickening adjacent to the masslike opacity. PET/CT scan showed a hypermetabolic spiculated left upper lobe lung mass with gross involvement of the mid to upper left pleura with associated loculated effusions. Suspicious hypermetabolic regional megan disease in the left hilum and left anterior mediastinum. Hypermetabolic lytic foci in the axial skeleton suspicious for multifocal osteolytic metastatic disease. A hypermetabolic nodule noted in the left lobe of the thyroid, nonspecific.\par FB, EBUS biopsy 12/3/18 performed (Dr. Looney). Left upper lobe endobronchial lesion/mass consistent with small cell carcinoma. BAL of left upper lobe negative for malignant cells. Patient began Etoposide/Carboplatin 12/31/18, completing 4th cycle 3/8/19.\par 3/2019-CT scan of the chest/abdomen/pelvis (addended report)-when compared to 11/2018 PET/CT scan showed the largest spiculated nodule of the left upper lobe of the lung improved, prominent left pleural thickening associated with the left upper lobe mass, markedly improved, other pleural-based nodules near completely resolved. Interval resolution of pleural effusions. Marked interval improvement in mediastinal and hilar adenopathy. New lytic and sclerotic lesions of bone. \par 4/2019-brain MRI showed a new tiny enhancing lesion in the left frontal subcortical region compatible with metastasis. Patient underwent gamma knife radiosurgery, completing 5/2019. 4/2019-Began Nivolumab.\par 7/2019-CT scan chest/abdomen/pelvis-new left upper lobe satellite nodules with increased left mediastinal and left hilar adenopathy, consistent with disease progression. Interval pathologic fracture of right posterior sixth rib, otherwise stable osseous metastases. [de-identified] : small cell carcinoma [de-identified] : Completed brain RT. +Hand paresthesias.\par No pain or N/V or H/A. +Mild intermittent cough without acute change, non-productive. No associated CP, SOB or hemoptysis.\par Patient was accompanied today by his daughter (health care proxy) Lin.\par

## 2019-07-22 ENCOUNTER — RESULT REVIEW (OUTPATIENT)
Age: 64
End: 2019-07-22

## 2019-07-22 ENCOUNTER — APPOINTMENT (OUTPATIENT)
Dept: INFUSION THERAPY | Facility: HOSPITAL | Age: 64
End: 2019-07-22

## 2019-07-22 ENCOUNTER — LABORATORY RESULT (OUTPATIENT)
Age: 64
End: 2019-07-22

## 2019-07-22 LAB
BASOPHILS # BLD AUTO: 0 K/UL — SIGNIFICANT CHANGE UP (ref 0–0.2)
BASOPHILS NFR BLD AUTO: 0.4 % — SIGNIFICANT CHANGE UP (ref 0–2)
EOSINOPHIL # BLD AUTO: 0.8 K/UL — HIGH (ref 0–0.5)
EOSINOPHIL NFR BLD AUTO: 10.3 % — HIGH (ref 0–6)
HCT VFR BLD CALC: 32.4 % — LOW (ref 39–50)
HGB BLD-MCNC: 11.1 G/DL — LOW (ref 13–17)
LYMPHOCYTES # BLD AUTO: 1.9 K/UL — SIGNIFICANT CHANGE UP (ref 1–3.3)
LYMPHOCYTES # BLD AUTO: 23.6 % — SIGNIFICANT CHANGE UP (ref 13–44)
MCHC RBC-ENTMCNC: 32.1 PG — SIGNIFICANT CHANGE UP (ref 27–34)
MCHC RBC-ENTMCNC: 34.2 G/DL — SIGNIFICANT CHANGE UP (ref 32–36)
MCV RBC AUTO: 93.9 FL — SIGNIFICANT CHANGE UP (ref 80–100)
MONOCYTES # BLD AUTO: 0.8 K/UL — SIGNIFICANT CHANGE UP (ref 0–0.9)
MONOCYTES NFR BLD AUTO: 10.2 % — SIGNIFICANT CHANGE UP (ref 2–14)
NEUTROPHILS # BLD AUTO: 4.5 K/UL — SIGNIFICANT CHANGE UP (ref 1.8–7.4)
NEUTROPHILS NFR BLD AUTO: 55.6 % — SIGNIFICANT CHANGE UP (ref 43–77)
PLATELET # BLD AUTO: 138 K/UL — LOW (ref 150–400)
RBC # BLD: 3.45 M/UL — LOW (ref 4.2–5.8)
RBC # FLD: 13.6 % — SIGNIFICANT CHANGE UP (ref 10.3–14.5)
WBC # BLD: 8.2 K/UL — SIGNIFICANT CHANGE UP (ref 3.8–10.5)
WBC # FLD AUTO: 8.2 K/UL — SIGNIFICANT CHANGE UP (ref 3.8–10.5)

## 2019-07-22 PROCEDURE — 93010 ELECTROCARDIOGRAM REPORT: CPT

## 2019-07-23 DIAGNOSIS — R11.2 NAUSEA WITH VOMITING, UNSPECIFIED: ICD-10-CM

## 2019-07-23 DIAGNOSIS — Z51.11 ENCOUNTER FOR ANTINEOPLASTIC CHEMOTHERAPY: ICD-10-CM

## 2019-07-29 ENCOUNTER — LABORATORY RESULT (OUTPATIENT)
Age: 64
End: 2019-07-29

## 2019-07-29 ENCOUNTER — RESULT REVIEW (OUTPATIENT)
Age: 64
End: 2019-07-29

## 2019-07-29 ENCOUNTER — APPOINTMENT (OUTPATIENT)
Dept: INFUSION THERAPY | Facility: HOSPITAL | Age: 64
End: 2019-07-29

## 2019-07-29 LAB
BASOPHILS # BLD AUTO: 0 K/UL — SIGNIFICANT CHANGE UP (ref 0–0.2)
BASOPHILS NFR BLD AUTO: 0.1 % — SIGNIFICANT CHANGE UP (ref 0–2)
EOSINOPHIL # BLD AUTO: 0.1 K/UL — SIGNIFICANT CHANGE UP (ref 0–0.5)
EOSINOPHIL NFR BLD AUTO: 2.5 % — SIGNIFICANT CHANGE UP (ref 0–6)
HCT VFR BLD CALC: 29.3 % — LOW (ref 39–50)
HGB BLD-MCNC: 10.2 G/DL — LOW (ref 13–17)
LYMPHOCYTES # BLD AUTO: 1.8 K/UL — SIGNIFICANT CHANGE UP (ref 1–3.3)
LYMPHOCYTES # BLD AUTO: 36.9 % — SIGNIFICANT CHANGE UP (ref 13–44)
MCHC RBC-ENTMCNC: 32.3 PG — SIGNIFICANT CHANGE UP (ref 27–34)
MCHC RBC-ENTMCNC: 35 G/DL — SIGNIFICANT CHANGE UP (ref 32–36)
MCV RBC AUTO: 92.2 FL — SIGNIFICANT CHANGE UP (ref 80–100)
MONOCYTES # BLD AUTO: 0.6 K/UL — SIGNIFICANT CHANGE UP (ref 0–0.9)
MONOCYTES NFR BLD AUTO: 12 % — SIGNIFICANT CHANGE UP (ref 2–14)
NEUTROPHILS # BLD AUTO: 2.4 K/UL — SIGNIFICANT CHANGE UP (ref 1.8–7.4)
NEUTROPHILS NFR BLD AUTO: 48.5 % — SIGNIFICANT CHANGE UP (ref 43–77)
PLATELET # BLD AUTO: 107 K/UL — LOW (ref 150–400)
RBC # BLD: 3.17 M/UL — LOW (ref 4.2–5.8)
RBC # FLD: 14.1 % — SIGNIFICANT CHANGE UP (ref 10.3–14.5)
WBC # BLD: 4.9 K/UL — SIGNIFICANT CHANGE UP (ref 3.8–10.5)
WBC # FLD AUTO: 4.9 K/UL — SIGNIFICANT CHANGE UP (ref 3.8–10.5)

## 2019-07-31 ENCOUNTER — APPOINTMENT (OUTPATIENT)
Dept: RADIATION ONCOLOGY | Facility: CLINIC | Age: 64
End: 2019-07-31

## 2019-07-31 ENCOUNTER — INPATIENT (INPATIENT)
Age: 64
LOS: 18 days | Discharge: NURSING, REHAB OR LONG-TERM CARE | End: 2019-08-19
Attending: HOSPITALIST | Admitting: HOSPITALIST
Payer: MEDICAID

## 2019-07-31 VITALS
OXYGEN SATURATION: 99 % | RESPIRATION RATE: 24 BRPM | SYSTOLIC BLOOD PRESSURE: 108 MMHG | DIASTOLIC BLOOD PRESSURE: 59 MMHG | HEART RATE: 109 BPM

## 2019-07-31 DIAGNOSIS — A41.9 SEPSIS, UNSPECIFIED ORGANISM: ICD-10-CM

## 2019-07-31 DIAGNOSIS — I10 ESSENTIAL (PRIMARY) HYPERTENSION: ICD-10-CM

## 2019-07-31 DIAGNOSIS — E13.9 OTHER SPECIFIED DIABETES MELLITUS WITHOUT COMPLICATIONS: ICD-10-CM

## 2019-07-31 DIAGNOSIS — D75.89 OTHER SPECIFIED DISEASES OF BLOOD AND BLOOD-FORMING ORGANS: ICD-10-CM

## 2019-07-31 DIAGNOSIS — N18.9 CHRONIC KIDNEY DISEASE, UNSPECIFIED: ICD-10-CM

## 2019-07-31 DIAGNOSIS — C34.90 MALIGNANT NEOPLASM OF UNSPECIFIED PART OF UNSPECIFIED BRONCHUS OR LUNG: ICD-10-CM

## 2019-07-31 DIAGNOSIS — Z29.9 ENCOUNTER FOR PROPHYLACTIC MEASURES, UNSPECIFIED: ICD-10-CM

## 2019-07-31 DIAGNOSIS — Z98.890 OTHER SPECIFIED POSTPROCEDURAL STATES: Chronic | ICD-10-CM

## 2019-07-31 DIAGNOSIS — N17.9 ACUTE KIDNEY FAILURE, UNSPECIFIED: ICD-10-CM

## 2019-07-31 LAB
ALBUMIN SERPL ELPH-MCNC: 3.4 G/DL — SIGNIFICANT CHANGE UP (ref 3.3–5)
ALP SERPL-CCNC: 172 U/L — HIGH (ref 40–120)
ALT FLD-CCNC: 111 U/L — HIGH (ref 4–41)
ANION GAP SERPL CALC-SCNC: 11 MMO/L — SIGNIFICANT CHANGE UP (ref 7–14)
ANISOCYTOSIS BLD QL: SLIGHT — SIGNIFICANT CHANGE UP
APPEARANCE UR: CLEAR — SIGNIFICANT CHANGE UP
AST SERPL-CCNC: 73 U/L — HIGH (ref 4–40)
B PERT DNA SPEC QL NAA+PROBE: NOT DETECTED — SIGNIFICANT CHANGE UP
BACTERIA # UR AUTO: NEGATIVE — SIGNIFICANT CHANGE UP
BASE EXCESS BLDV CALC-SCNC: 3.2 MMOL/L — SIGNIFICANT CHANGE UP
BASOPHILS # BLD AUTO: 0.01 K/UL — SIGNIFICANT CHANGE UP (ref 0–0.2)
BASOPHILS NFR BLD AUTO: 0.1 % — SIGNIFICANT CHANGE UP (ref 0–2)
BILIRUB SERPL-MCNC: 0.8 MG/DL — SIGNIFICANT CHANGE UP (ref 0.2–1.2)
BILIRUB UR-MCNC: NEGATIVE — SIGNIFICANT CHANGE UP
BLOOD GAS VENOUS - CREATININE: 1.7 MG/DL — HIGH (ref 0.5–1.3)
BLOOD GAS VENOUS - FIO2: 20 — SIGNIFICANT CHANGE UP
BLOOD UR QL VISUAL: NEGATIVE — SIGNIFICANT CHANGE UP
BUN SERPL-MCNC: 24 MG/DL — HIGH (ref 7–23)
C PNEUM DNA SPEC QL NAA+PROBE: NOT DETECTED — SIGNIFICANT CHANGE UP
CALCIUM SERPL-MCNC: 8.9 MG/DL — SIGNIFICANT CHANGE UP (ref 8.4–10.5)
CHLORIDE BLDV-SCNC: 99 MMOL/L — SIGNIFICANT CHANGE UP (ref 96–108)
CHLORIDE SERPL-SCNC: 96 MMOL/L — LOW (ref 98–107)
CO2 SERPL-SCNC: 26 MMOL/L — SIGNIFICANT CHANGE UP (ref 22–31)
COLOR SPEC: SIGNIFICANT CHANGE UP
CREAT SERPL-MCNC: 1.56 MG/DL — HIGH (ref 0.5–1.3)
EOSINOPHIL # BLD AUTO: 0 K/UL — SIGNIFICANT CHANGE UP (ref 0–0.5)
EOSINOPHIL NFR BLD AUTO: 0 % — SIGNIFICANT CHANGE UP (ref 0–6)
FLUAV H1 2009 PAND RNA SPEC QL NAA+PROBE: NOT DETECTED — SIGNIFICANT CHANGE UP
FLUAV H1 RNA SPEC QL NAA+PROBE: NOT DETECTED — SIGNIFICANT CHANGE UP
FLUAV H3 RNA SPEC QL NAA+PROBE: NOT DETECTED — SIGNIFICANT CHANGE UP
FLUAV SUBTYP SPEC NAA+PROBE: NOT DETECTED — SIGNIFICANT CHANGE UP
FLUBV RNA SPEC QL NAA+PROBE: NOT DETECTED — SIGNIFICANT CHANGE UP
GAS PNL BLDV: 129 MMOL/L — LOW (ref 136–146)
GLUCOSE BLDC GLUCOMTR-MCNC: 111 MG/DL — HIGH (ref 70–99)
GLUCOSE BLDV-MCNC: 205 MG/DL — HIGH (ref 70–99)
GLUCOSE SERPL-MCNC: 207 MG/DL — HIGH (ref 70–99)
GLUCOSE UR-MCNC: NEGATIVE — SIGNIFICANT CHANGE UP
HADV DNA SPEC QL NAA+PROBE: NOT DETECTED — SIGNIFICANT CHANGE UP
HCO3 BLDV-SCNC: 25 MMOL/L — SIGNIFICANT CHANGE UP (ref 20–27)
HCOV PNL SPEC NAA+PROBE: SIGNIFICANT CHANGE UP
HCT VFR BLD CALC: 27.1 % — LOW (ref 39–50)
HCT VFR BLDV CALC: 31.4 % — LOW (ref 39–51)
HGB BLD-MCNC: 9.1 G/DL — LOW (ref 13–17)
HGB BLDV-MCNC: 10.2 G/DL — LOW (ref 13–17)
HMPV RNA SPEC QL NAA+PROBE: NOT DETECTED — SIGNIFICANT CHANGE UP
HPIV1 RNA SPEC QL NAA+PROBE: NOT DETECTED — SIGNIFICANT CHANGE UP
HPIV2 RNA SPEC QL NAA+PROBE: NOT DETECTED — SIGNIFICANT CHANGE UP
HPIV3 RNA SPEC QL NAA+PROBE: NOT DETECTED — SIGNIFICANT CHANGE UP
HPIV4 RNA SPEC QL NAA+PROBE: NOT DETECTED — SIGNIFICANT CHANGE UP
HYALINE CASTS # UR AUTO: NEGATIVE — SIGNIFICANT CHANGE UP
HYPOCHROMIA BLD QL: SLIGHT — SIGNIFICANT CHANGE UP
IMM GRANULOCYTES NFR BLD AUTO: 0.6 % — SIGNIFICANT CHANGE UP (ref 0–1.5)
KETONES UR-MCNC: NEGATIVE — SIGNIFICANT CHANGE UP
LACTATE BLDV-MCNC: 2 MMOL/L — SIGNIFICANT CHANGE UP (ref 0.5–2)
LEUKOCYTE ESTERASE UR-ACNC: NEGATIVE — SIGNIFICANT CHANGE UP
LG PLATELETS BLD QL AUTO: SLIGHT — SIGNIFICANT CHANGE UP
LIDOCAIN IGE QN: 20.4 U/L — SIGNIFICANT CHANGE UP (ref 7–60)
LYMPHOCYTES # BLD AUTO: 0.38 K/UL — LOW (ref 1–3.3)
LYMPHOCYTES # BLD AUTO: 3.2 % — LOW (ref 13–44)
MANUAL SMEAR VERIFICATION: SIGNIFICANT CHANGE UP
MCHC RBC-ENTMCNC: 30.8 PG — SIGNIFICANT CHANGE UP (ref 27–34)
MCHC RBC-ENTMCNC: 33.6 % — SIGNIFICANT CHANGE UP (ref 32–36)
MCV RBC AUTO: 91.9 FL — SIGNIFICANT CHANGE UP (ref 80–100)
MONOCYTES # BLD AUTO: 0.04 K/UL — SIGNIFICANT CHANGE UP (ref 0–0.9)
MONOCYTES NFR BLD AUTO: 0.3 % — LOW (ref 2–14)
NEUTROPHILS # BLD AUTO: 11.42 K/UL — HIGH (ref 1.8–7.4)
NEUTROPHILS NFR BLD AUTO: 95.8 % — HIGH (ref 43–77)
NITRITE UR-MCNC: NEGATIVE — SIGNIFICANT CHANGE UP
NRBC # FLD: 0 K/UL — SIGNIFICANT CHANGE UP (ref 0–0)
OVALOCYTES BLD QL SMEAR: SLIGHT — SIGNIFICANT CHANGE UP
PCO2 BLDV: 50 MMHG — SIGNIFICANT CHANGE UP (ref 41–51)
PH BLDV: 7.37 PH — SIGNIFICANT CHANGE UP (ref 7.32–7.43)
PH UR: 6 — SIGNIFICANT CHANGE UP (ref 5–8)
PLATELET # BLD AUTO: 71 K/UL — LOW (ref 150–400)
PLATELET COUNT - ESTIMATE: SIGNIFICANT CHANGE UP
PMV BLD: 9.9 FL — SIGNIFICANT CHANGE UP (ref 7–13)
PO2 BLDV: 16 MMHG — LOW (ref 35–40)
POTASSIUM BLDV-SCNC: 3.7 MMOL/L — SIGNIFICANT CHANGE UP (ref 3.4–4.5)
POTASSIUM SERPL-MCNC: 3.9 MMOL/L — SIGNIFICANT CHANGE UP (ref 3.5–5.3)
POTASSIUM SERPL-SCNC: 3.9 MMOL/L — SIGNIFICANT CHANGE UP (ref 3.5–5.3)
PROT SERPL-MCNC: 6.9 G/DL — SIGNIFICANT CHANGE UP (ref 6–8.3)
PROT UR-MCNC: 30 — SIGNIFICANT CHANGE UP
RBC # BLD: 2.95 M/UL — LOW (ref 4.2–5.8)
RBC # FLD: 13.7 % — SIGNIFICANT CHANGE UP (ref 10.3–14.5)
RBC CASTS # UR COMP ASSIST: SIGNIFICANT CHANGE UP (ref 0–?)
RSV RNA SPEC QL NAA+PROBE: NOT DETECTED — SIGNIFICANT CHANGE UP
RV+EV RNA SPEC QL NAA+PROBE: NOT DETECTED — SIGNIFICANT CHANGE UP
SAO2 % BLDV: 17 % — LOW (ref 60–85)
SODIUM SERPL-SCNC: 133 MMOL/L — LOW (ref 135–145)
SP GR SPEC: 1.01 — SIGNIFICANT CHANGE UP (ref 1–1.04)
SQUAMOUS # UR AUTO: SIGNIFICANT CHANGE UP
TROPONIN T, HIGH SENSITIVITY: 37 NG/L — SIGNIFICANT CHANGE UP (ref ?–14)
UROBILINOGEN FLD QL: NORMAL — SIGNIFICANT CHANGE UP
WBC # BLD: 11.92 K/UL — HIGH (ref 3.8–10.5)
WBC # FLD AUTO: 11.92 K/UL — HIGH (ref 3.8–10.5)
WBC UR QL: SIGNIFICANT CHANGE UP (ref 0–?)

## 2019-07-31 PROCEDURE — 71045 X-RAY EXAM CHEST 1 VIEW: CPT | Mod: 26

## 2019-07-31 PROCEDURE — 99223 1ST HOSP IP/OBS HIGH 75: CPT

## 2019-07-31 RX ORDER — SODIUM CHLORIDE 9 MG/ML
2000 INJECTION INTRAMUSCULAR; INTRAVENOUS; SUBCUTANEOUS ONCE
Refills: 0 | Status: COMPLETED | OUTPATIENT
Start: 2019-07-31 | End: 2019-07-31

## 2019-07-31 RX ORDER — DEXTROSE 50 % IN WATER 50 %
25 SYRINGE (ML) INTRAVENOUS ONCE
Refills: 0 | Status: DISCONTINUED | OUTPATIENT
Start: 2019-07-31 | End: 2019-08-19

## 2019-07-31 RX ORDER — FAMOTIDINE 10 MG/ML
40 INJECTION INTRAVENOUS AT BEDTIME
Refills: 0 | Status: DISCONTINUED | OUTPATIENT
Start: 2019-07-31 | End: 2019-08-19

## 2019-07-31 RX ORDER — FERROUS SULFATE 325(65) MG
0 TABLET ORAL
Qty: 0 | Refills: 0 | DISCHARGE

## 2019-07-31 RX ORDER — LOSARTAN POTASSIUM 100 MG/1
1 TABLET, FILM COATED ORAL
Qty: 0 | Refills: 0 | DISCHARGE

## 2019-07-31 RX ORDER — SIMVASTATIN 20 MG/1
5 TABLET, FILM COATED ORAL AT BEDTIME
Refills: 0 | Status: DISCONTINUED | OUTPATIENT
Start: 2019-07-31 | End: 2019-08-19

## 2019-07-31 RX ORDER — VANCOMYCIN HCL 1 G
1000 VIAL (EA) INTRAVENOUS ONCE
Refills: 0 | Status: COMPLETED | OUTPATIENT
Start: 2019-07-31 | End: 2019-07-31

## 2019-07-31 RX ORDER — PIPERACILLIN AND TAZOBACTAM 4; .5 G/20ML; G/20ML
3.38 INJECTION, POWDER, LYOPHILIZED, FOR SOLUTION INTRAVENOUS ONCE
Refills: 0 | Status: COMPLETED | OUTPATIENT
Start: 2019-07-31 | End: 2019-07-31

## 2019-07-31 RX ORDER — GLUCAGON INJECTION, SOLUTION 0.5 MG/.1ML
1 INJECTION, SOLUTION SUBCUTANEOUS ONCE
Refills: 0 | Status: DISCONTINUED | OUTPATIENT
Start: 2019-07-31 | End: 2019-08-19

## 2019-07-31 RX ORDER — DEXTROSE 50 % IN WATER 50 %
15 SYRINGE (ML) INTRAVENOUS ONCE
Refills: 0 | Status: DISCONTINUED | OUTPATIENT
Start: 2019-07-31 | End: 2019-08-19

## 2019-07-31 RX ORDER — PIPERACILLIN AND TAZOBACTAM 4; .5 G/20ML; G/20ML
3.38 INJECTION, POWDER, LYOPHILIZED, FOR SOLUTION INTRAVENOUS EVERY 8 HOURS
Refills: 0 | Status: COMPLETED | OUTPATIENT
Start: 2019-07-31 | End: 2019-08-02

## 2019-07-31 RX ORDER — SODIUM CHLORIDE 9 MG/ML
1000 INJECTION, SOLUTION INTRAVENOUS
Refills: 0 | Status: DISCONTINUED | OUTPATIENT
Start: 2019-07-31 | End: 2019-08-01

## 2019-07-31 RX ORDER — DEXTROSE 50 % IN WATER 50 %
12.5 SYRINGE (ML) INTRAVENOUS ONCE
Refills: 0 | Status: DISCONTINUED | OUTPATIENT
Start: 2019-07-31 | End: 2019-08-19

## 2019-07-31 RX ORDER — SODIUM CHLORIDE 9 MG/ML
1000 INJECTION, SOLUTION INTRAVENOUS
Refills: 0 | Status: DISCONTINUED | OUTPATIENT
Start: 2019-07-31 | End: 2019-08-19

## 2019-07-31 RX ORDER — INSULIN LISPRO 100/ML
VIAL (ML) SUBCUTANEOUS
Refills: 0 | Status: DISCONTINUED | OUTPATIENT
Start: 2019-07-31 | End: 2019-08-19

## 2019-07-31 RX ORDER — INSULIN GLARGINE 100 [IU]/ML
20 INJECTION, SOLUTION SUBCUTANEOUS AT BEDTIME
Refills: 0 | Status: DISCONTINUED | OUTPATIENT
Start: 2019-07-31 | End: 2019-08-04

## 2019-07-31 RX ADMIN — PIPERACILLIN AND TAZOBACTAM 200 GRAM(S): 4; .5 INJECTION, POWDER, LYOPHILIZED, FOR SOLUTION INTRAVENOUS at 18:45

## 2019-07-31 RX ADMIN — Medication 250 MILLIGRAM(S): at 14:24

## 2019-07-31 RX ADMIN — PIPERACILLIN AND TAZOBACTAM 200 GRAM(S): 4; .5 INJECTION, POWDER, LYOPHILIZED, FOR SOLUTION INTRAVENOUS at 13:25

## 2019-07-31 RX ADMIN — SODIUM CHLORIDE 1000 MILLILITER(S): 9 INJECTION INTRAMUSCULAR; INTRAVENOUS; SUBCUTANEOUS at 13:44

## 2019-07-31 RX ADMIN — SIMVASTATIN 5 MILLIGRAM(S): 20 TABLET, FILM COATED ORAL at 23:11

## 2019-07-31 RX ADMIN — SODIUM CHLORIDE 100 MILLILITER(S): 9 INJECTION, SOLUTION INTRAVENOUS at 19:30

## 2019-07-31 RX ADMIN — Medication 1: at 18:45

## 2019-07-31 RX ADMIN — FAMOTIDINE 40 MILLIGRAM(S): 10 INJECTION INTRAVENOUS at 23:11

## 2019-07-31 NOTE — H&P ADULT - PROBLEM SELECTOR PLAN 6
At baseline, Cr 1.57  - continue IV LR @ 100 cc/hr  - renally dose medications, avoid nephrotoxic medications  - monitor BMP, urine output

## 2019-07-31 NOTE — ED PROVIDER NOTE - CLINICAL SUMMARY MEDICAL DECISION MAKING FREE TEXT BOX
Cabot: 63M with PMH of lung CA on chemo, brought in for fever to 102, chills, 1 episode of NBNB vomiting.  On exam, soft BP, normal heart rate, afebrile, diaphoretic, MMM, eyes clear, lungs CTAB, heart sounds normal, abd soft, NT, ND, no CVAT, LEs without edema, wwp, neuro: alert and oriented, no focal deficits.  Likely septic.  Will send sepsis labs, UA, CXR, cover with V/Z, admit.

## 2019-07-31 NOTE — H&P ADULT - ASSESSMENT
62 y/o man with PMHx of extensive small cell lung cancer, metastatic to bone, and brain followed by Dr. Thao who presents with fever, chills, urinary incontinence and a dry cough, last chemotherapy was on Monday admitted for management of suspected sepsis.

## 2019-07-31 NOTE — ED ADULT TRIAGE NOTE - CHIEF COMPLAINT QUOTE
Pt sent in for rapid response from [facility] ER, pt c/o intermittent cp/ diaphoresis, n/v, pt is cool and clammy, hypotensive, pt to be brought back directly to -4. Pt has hx of Lung CA, febrile at home.

## 2019-07-31 NOTE — H&P ADULT - PROBLEM SELECTOR PLAN 8
DVT PPx: ICD  Diet: DASH/TLC, Diabetic  Dispo: PT consulted DVT PPx: ICD  Diet: DASH/TLC, Diabetic  Dispo: PT consulted  Code status: Full code  Daughter Lin is [phone]

## 2019-07-31 NOTE — ED ADULT NURSE NOTE - OBJECTIVE STATEMENT
63M PMH HTN DM HLD active lung cancer treated at [facility] with Gemzar, last chemo 2 days ago,  C/O 1 day of fever to 102F, chills and vomiting. Pt denies abd pain, admits to intermittent cough, denies any other symptoms or complaints. PSH partial L foot amputation. Pt AOX3, currently resting comfortably, family at bedside. Resp even and unlabored, denies pain.

## 2019-07-31 NOTE — ED PROVIDER NOTE - ATTENDING CONTRIBUTION TO CARE
I, Jennifer Cabot, MD, have performed a history and physical exam of the patient and discussed their management with the ACP.  I reviewed the PA's note and agree with the documented findings and plan of care. My medical decision making and observations are found above.    Cabot: 63M with PMH of lung CA on chemo, brought in for fever to 102, chills, 1 episode of NBNB vomiting.  Denies diarrhea, urinary sx, cough, HA.  On exam, soft BP, normal heart rate, afebrile, diaphoretic, MMM, eyes clear, lungs CTAB, heart sounds normal, abd soft, NT, ND, no CVAT, LEs without edema, wwp, neuro: alert and oriented, no focal deficits.  Likely septic.  Will send sepsis labs, UA, CXR, cover with V/Z, admit.

## 2019-07-31 NOTE — H&P ADULT - PROBLEM SELECTOR PLAN 4
F/U A1c  - continue ISS, FS QID  - diabetic diet F/U A1c  - restart lantus at 20 units, home is 50 units, tailor according to blood sugars  - continue ISS, FS QID  - diabetic diet

## 2019-07-31 NOTE — ED ADULT NURSE NOTE - CHIEF COMPLAINT QUOTE
Pt sent in for rapid response from [facility] ER, pt c/o intermittent cp/ diaphoresis, n/v, pt is cool and clammy, hypotensive, pt to be brought back directly to -4. Pt has hx of Lung CA, febrile at home.

## 2019-07-31 NOTE — ED PROVIDER NOTE - OBJECTIVE STATEMENT
64 Y/O M PMH HTN DM HLD active lung cancer treated at [facility] with Gemzar C/O 1 day of fever chills and vomiting. Pt denies abd pn, admits to intermittent cough, denies any other symptoms or complaints. PSH partial L foot amputation. Pt initially seen at [facility] due to entering the wrong ED. Pt stable and brought to adult ED.

## 2019-07-31 NOTE — H&P ADULT - PROBLEM SELECTOR PROBLEM 3
Other specified diabetes mellitus without complication, without long-term current use of insulin Bicytopenia

## 2019-07-31 NOTE — H&P ADULT - PROBLEM SELECTOR PROBLEM 4
Essential hypertension Other specified diabetes mellitus without complication, without long-term current use of insulin

## 2019-07-31 NOTE — H&P ADULT - PROBLEM SELECTOR PLAN 1
Leukocytosis, fever at home of 102.7, HR 95  Unclear source, negative UA, negative CXR, only complaining of urinary incontinence, dry cough, ddx also includes tumor fever from increased burden as shown by POD  - s/p IV vanco/zosyn  - continue IV LR @ 100 cc/hr  - continue IV zosyn for now  - F/U RVP  - F/U blood and urine cx  - monitor CBC, vitals

## 2019-07-31 NOTE — ED PROVIDER NOTE - CONSTITUTIONAL, MLM
normal... Ill appearing ,awake, alert, oriented to person, place, time/situation and in no immediate distress.

## 2019-07-31 NOTE — H&P ADULT - HISTORY OF PRESENT ILLNESS
HPI:  62 y/o man with PMHx of extensive small cell lung cancer, metastatic to bone, and brain followed by Dr. Thao who presents fever, chills, cough. Patient recently emig[place] the [place] from [facility] 2018 diagnosed 2018 with small cell carcinoma s/p Etoposide/Carboplatin 3/8/19, 2019-brain MRI showed a new tiny enhancing lesion in the left frontal subcortical region compatible with metastasis. Patient underwent gamma knife radiosurgery, completing 2019. 2019-Began Nivolumab, 2019-CT scan chest/abdomen/pelvis-new left upper lobe satellite nodules with increased left mediastinal and left hilar adenopathy, consistent with disease progression. Interval pathologic fracture of right posterior sixth rib, otherwise stable osseous metastases.     PAST MEDICAL & SURGICAL HISTORY:  Lung cancer  Hyperlipidemia  Diabetes mellitus  Hypertension  History of foot surgery      Review of Systems:   CONSTITUTIONAL: No fever, weight loss, or fatigue  EYES: No eye pain, visual disturbances, or discharge  ENMT:  No difficulty hearing, tinnitus, vertigo; No sinus or throat pain  NECK: No pain or stiffness  BREASTS: No pain, masses, or nipple discharge  RESPIRATORY: No cough, wheezing, chills or hemoptysis; No shortness of breath  CARDIOVASCULAR: No chest pain, palpitations, dizziness, or leg swelling  GASTROINTESTINAL: No abdominal or epigastric pain. No nausea, vomiting, or hematemesis; No diarrhea or constipation. No melena or hematochezia.  GENITOURINARY: No dysuria, frequency, hematuria, or incontinence  NEUROLOGICAL: No headaches, memory loss, loss of strength, numbness, or tremors  SKIN: No itching, burning, rashes, or lesions   LYMPH NODES: No enlarged glands  ENDOCRINE: No heat or cold intolerance; No hair loss  MUSCULOSKELETAL: No joint pain or swelling; No muscle, back, or extremity pain  PSYCHIATRIC: No depression, anxiety, mood swings, or difficulty sleeping  HEME/LYMPH: No easy bruising, or bleeding gums  ALLERGY AND IMMUNOLOGIC: No hives or eczema    Allergies    No Known Allergies    Intolerances        Social History:   Former smoker    · 1PPD x 14 years-stopped in his 40's  Four children   · 3 living children-daughter helps in patient care; 1 son in [facility] and 1 son in [place]    No alcohol use  No illicit drug use  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    FAMILY HISTORY:  No pertinent family history in first degree relatives      MEDICATIONS  (STANDING):    MEDICATIONS  (PRN):      T(C): 36.7 (19 @ 15:54), Max: 36.8 (19 @ 12:57)  HR: 81 (19 @ 15:54) (81 - 109)  BP: 122/63 (19 @ 15:54) (99/61 - 122/63)  RR: 18 (19 @ 15:54) (18 - 24)  SpO2: 100% (19 @ 15:54) (99% - 100%)    CAPILLARY BLOOD GLUCOSE      POCT Blood Glucose.: 234 mg/dL (2019 12:38)    I&O's Summary      PHYSICAL EXAM:  GENERAL: NAD, well-developed  HEAD:  Atraumatic, Normocephalic  EYES: EOMI, PERRLA, conjunctiva and sclera clear  NECK: Supple, No elevated JVD  CHEST/LUNG: Clear to auscultation bilaterally; No wheeze  HEART: Regular rate and rhythm; No murmurs, rubs, or gallops  ABDOMEN: Soft, Nontender, Nondistended; Bowel sounds present  EXTREMITIES:  2+ Peripheral Pulses, No clubbing, cyanosis, or edema  PSYCH: AAOx3  NEUROLOGY: CN II-XII grossly intact, moving all extremities  SKIN: No rashes or lesions    LABS:                        9.1    11.92 )-----------( 71       ( 2019 14:02 )             27.1         133<L>  |  96<L>  |  24<H>  ----------------------------<  207<H>  3.9   |  26  |  1.56<H>    Ca    8.9      2019 13:52    TPro  6.9  /  Alb  3.4  /  TBili  0.8  /  DBili  x   /  AST  73<H>  /  ALT  111<H>  /  AlkPhos  172<H>            Urinalysis Basic - ( 2019 14:57 )    Color: LIGHT YELLOW / Appearance: CLEAR / S.012 / pH: 6.0  Gluc: NEGATIVE / Ketone: NEGATIVE  / Bili: NEGATIVE / Urobili: NORMAL   Blood: NEGATIVE / Protein: 30 / Nitrite: NEGATIVE   Leuk Esterase: NEGATIVE / RBC: 3-5 / WBC 0-2   Sq Epi: OCC / Non Sq Epi: x / Bacteria: NEGATIVE          RADIOLOGY & ADDITIONAL TESTS:    ECG Personally Reviewed -     Imaging Personally Reviewed:    Consultant(s) Notes Reviewed:      Care Discussed with Consultants/Other Providers: HPI:  64 y/o man with PMHx of extensive small cell lung cancer, metastatic to bone, and brain followed by Dr. Thao who presents fever, chills, urinary incontinence and a dry cough. History is obtained by his daughter Lin who is his health-care proxy and insisted on translation. Daughter reports the patient had a fever of 102.7 and has been very weak with chills. Patient denies diarrhea, nausea or vomiting. No sick contacts or recent travel, no CP or dyspnea. Last chemotherapy was on Monday, no new medications/recent antibiotic use.   Onc Hx:  Patient recently emigrated to the  from [facility] 2018 diagnosed 2018 with small cell carcinoma s/p Etoposide/Carboplatin 3/8/19, 2019-brain MRI showed a new tiny enhancing lesion in the left frontal subcortical region compatible with metastasis. Patient underwent gamma knife radiosurgery, completing 2019. 2019-Began Nivolumab, 2019-CT scan chest/abdomen/pelvis-new left upper lobe satellite nodules with increased left mediastinal and left hilar adenopathy, consistent with disease progression. Interval pathologic fracture of right posterior sixth rib, otherwise stable osseous metastases.   In the ED patient was found to be normotensive, satting 100% on RA, afebrile. Labs notable fro a leukocytosis of 11.92, platelets of 71, previously 107. CXR neg, UA neg. Given IV vanco, zosyn, admitted for further management.     PAST MEDICAL & SURGICAL HISTORY:  Lung cancer  Hyperlipidemia  Diabetes mellitus  Hypertension  History of foot surgery      Review of Systems:   CONSTITUTIONAL: No fever, weight loss, or fatigue  EYES: No eye pain, visual disturbances, or discharge  ENMT:  No difficulty hearing, tinnitus, vertigo; No sinus or throat pain  NECK: No pain or stiffness  BREASTS: No pain, masses, or nipple discharge  RESPIRATORY: No cough, wheezing, chills or hemoptysis; No shortness of breath  CARDIOVASCULAR: No chest pain, palpitations, dizziness, or leg swelling  GASTROINTESTINAL: No abdominal or epigastric pain. No nausea, vomiting, or hematemesis; No diarrhea or constipation. No melena or hematochezia.  GENITOURINARY: No dysuria, frequency, hematuria, or incontinence  NEUROLOGICAL: No headaches, memory loss, loss of strength, numbness, or tremors  SKIN: No itching, burning, rashes, or lesions   LYMPH NODES: No enlarged glands  ENDOCRINE: No heat or cold intolerance; No hair loss  MUSCULOSKELETAL: No joint pain or swelling; No muscle, back, or extremity pain  PSYCHIATRIC: No depression, anxiety, mood swings, or difficulty sleeping  HEME/LYMPH: No easy bruising, or bleeding gums  ALLERGY AND IMMUNOLOGIC: No hives or eczema    Allergies    No Known Allergies    Intolerances    Social History:   Former smoker    · 1PPD x 14 years-stopped in his 40's  Four children   · 3 living children-daughter helps in patient care; 1 son in [facility] and 1 son in [place]    No alcohol use  No illicit drug use  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    FAMILY HISTORY:  No pertinent family history in first degree relatives      MEDICATIONS  (STANDING):    MEDICATIONS  (PRN):      T(C): 36.7 (19 @ 15:54), Max: 36.8 (19 @ 12:57)  HR: 81 (19 @ 15:54) (81 - 109)  BP: 122/63 (19 @ 15:54) (99/61 - 122/63)  RR: 18 (19 @ 15:54) (18 - 24)  SpO2: 100% (19 @ 15:54) (99% - 100%)    CAPILLARY BLOOD GLUCOSE      POCT Blood Glucose.: 234 mg/dL (2019 12:38)    I&O's Summary      PHYSICAL EXAM:  GENERAL: NAD, appears ill, cooperative with exam  HEAD:  Atraumatic, Normocephalic  EYES: EOMI, PERRLA, conjunctiva and sclera clear  NECK: Supple, No elevated JVD  CHEST/LUNG: Diminished at bases; No wheeze  HEART: Regular rate and rhythm; No murmurs, rubs, or gallops  ABDOMEN: Soft, Nontender, Nondistended; Bowel sounds present  EXTREMITIES:  2+ Peripheral Pulses, No clubbing, cyanosis, or edema  PSYCH: AAOx3  NEUROLOGY: CN II-XII grossly intact, moving all extremities  SKIN: No rashes or lesions    LABS:                        9.1    11.92 )-----------( 71       ( 2019 14:02 )             27.1     07-31    133<L>  |  96<L>  |  24<H>  ----------------------------<  207<H>  3.9   |  26  |  1.56<H>    Ca    8.9      2019 13:52    TPro  6.9  /  Alb  3.4  /  TBili  0.8  /  DBili  x   /  AST  73<H>  /  ALT  111<H>  /  AlkPhos  172<H>  07-          Urinalysis Basic - ( 2019 14:57 )    Color: LIGHT YELLOW / Appearance: CLEAR / S.012 / pH: 6.0  Gluc: NEGATIVE / Ketone: NEGATIVE  / Bili: NEGATIVE / Urobili: NORMAL   Blood: NEGATIVE / Protein: 30 / Nitrite: NEGATIVE   Leuk Esterase: NEGATIVE / RBC: 3-5 / WBC 0-2   Sq Epi: OCC / Non Sq Epi: x / Bacteria: NEGATIVE          RADIOLOGY & ADDITIONAL TESTS:    ECG Personally Reviewed -     Imaging Personally Reviewed:    Consultant(s) Notes Reviewed:      Care Discussed with Consultants/Other Providers:

## 2019-07-31 NOTE — H&P ADULT - PROBLEM SELECTOR PLAN 7
Likely pre-renal, Cr 1.57  - continue IV LR @ 100 cc/hr  - renally dose medications, avoid nephrotoxic medications  - monitor BMP, urine output

## 2019-07-31 NOTE — H&P ADULT - PROBLEM SELECTOR PLAN 2
Extensive small cell lung cancer, metastatic to bone, and brain. As per OP records, Dr. Thao discussed with patient/daughter CAT scan results in detail. Explained gradually progressive disease, incurability, and palliative treatment intent. Discussed treatment options at this point, including palliative care/best supportive care/hospice. Patient/daughter wished to continue with treatment at this time.   - holding chemo in setting of possible sepsis, currently on monotherapy with Gemzar for now  - close F/U at [facility]

## 2019-08-01 ENCOUNTER — OUTPATIENT (OUTPATIENT)
Dept: OUTPATIENT SERVICES | Facility: HOSPITAL | Age: 64
LOS: 1 days | End: 2019-08-01
Payer: MEDICAID

## 2019-08-01 DIAGNOSIS — Z98.890 OTHER SPECIFIED POSTPROCEDURAL STATES: Chronic | ICD-10-CM

## 2019-08-01 LAB
ALBUMIN SERPL ELPH-MCNC: 2.7 G/DL — LOW (ref 3.3–5)
ALP SERPL-CCNC: 138 U/L — HIGH (ref 40–120)
ALT FLD-CCNC: 76 U/L — HIGH (ref 4–41)
ANION GAP SERPL CALC-SCNC: 13 MMO/L — SIGNIFICANT CHANGE UP (ref 7–14)
AST SERPL-CCNC: 43 U/L — HIGH (ref 4–40)
BASOPHILS # BLD AUTO: 0.01 K/UL — SIGNIFICANT CHANGE UP (ref 0–0.2)
BASOPHILS NFR BLD AUTO: 0.1 % — SIGNIFICANT CHANGE UP (ref 0–2)
BILIRUB SERPL-MCNC: 1.1 MG/DL — SIGNIFICANT CHANGE UP (ref 0.2–1.2)
BUN SERPL-MCNC: 17 MG/DL — SIGNIFICANT CHANGE UP (ref 7–23)
CALCIUM SERPL-MCNC: 8 MG/DL — LOW (ref 8.4–10.5)
CHLORIDE SERPL-SCNC: 99 MMOL/L — SIGNIFICANT CHANGE UP (ref 98–107)
CO2 SERPL-SCNC: 24 MMOL/L — SIGNIFICANT CHANGE UP (ref 22–31)
CREAT SERPL-MCNC: 1.29 MG/DL — SIGNIFICANT CHANGE UP (ref 0.5–1.3)
CRP SERPL-MCNC: 280.8 MG/L — HIGH
EOSINOPHIL # BLD AUTO: 0.05 K/UL — SIGNIFICANT CHANGE UP (ref 0–0.5)
EOSINOPHIL NFR BLD AUTO: 0.4 % — SIGNIFICANT CHANGE UP (ref 0–6)
ERYTHROCYTE [SEDIMENTATION RATE] IN BLOOD: > 140 MM/HR — HIGH (ref 1–15)
GLUCOSE BLDC GLUCOMTR-MCNC: 177 MG/DL — HIGH (ref 70–99)
GLUCOSE BLDC GLUCOMTR-MCNC: 210 MG/DL — HIGH (ref 70–99)
GLUCOSE BLDC GLUCOMTR-MCNC: 264 MG/DL — HIGH (ref 70–99)
GLUCOSE BLDC GLUCOMTR-MCNC: 273 MG/DL — HIGH (ref 70–99)
GLUCOSE BLDC GLUCOMTR-MCNC: 71 MG/DL — SIGNIFICANT CHANGE UP (ref 70–99)
GLUCOSE BLDC GLUCOMTR-MCNC: 73 MG/DL — SIGNIFICANT CHANGE UP (ref 70–99)
GLUCOSE BLDC GLUCOMTR-MCNC: 78 MG/DL — SIGNIFICANT CHANGE UP (ref 70–99)
GLUCOSE BLDC GLUCOMTR-MCNC: 89 MG/DL — SIGNIFICANT CHANGE UP (ref 70–99)
GLUCOSE SERPL-MCNC: 65 MG/DL — LOW (ref 70–99)
HBA1C BLD-MCNC: 10.2 % — HIGH (ref 4–5.6)
HCT VFR BLD CALC: 23.3 % — LOW (ref 39–50)
HCT VFR BLD CALC: 23.8 % — LOW (ref 39–50)
HCV AB S/CO SERPL IA: 0.2 S/CO — SIGNIFICANT CHANGE UP (ref 0–0.99)
HCV AB SERPL-IMP: SIGNIFICANT CHANGE UP
HGB BLD-MCNC: 7.6 G/DL — LOW (ref 13–17)
HGB BLD-MCNC: 7.9 G/DL — LOW (ref 13–17)
IMM GRANULOCYTES NFR BLD AUTO: 1.5 % — SIGNIFICANT CHANGE UP (ref 0–1.5)
LYMPHOCYTES # BLD AUTO: 0.76 K/UL — LOW (ref 1–3.3)
LYMPHOCYTES # BLD AUTO: 6.2 % — LOW (ref 13–44)
MANUAL SMEAR VERIFICATION: SIGNIFICANT CHANGE UP
MCHC RBC-ENTMCNC: 30 PG — SIGNIFICANT CHANGE UP (ref 27–34)
MCHC RBC-ENTMCNC: 30.4 PG — SIGNIFICANT CHANGE UP (ref 27–34)
MCHC RBC-ENTMCNC: 32.6 % — SIGNIFICANT CHANGE UP (ref 32–36)
MCHC RBC-ENTMCNC: 33.2 % — SIGNIFICANT CHANGE UP (ref 32–36)
MCV RBC AUTO: 91.5 FL — SIGNIFICANT CHANGE UP (ref 80–100)
MCV RBC AUTO: 92.1 FL — SIGNIFICANT CHANGE UP (ref 80–100)
MONOCYTES # BLD AUTO: 0.06 K/UL — SIGNIFICANT CHANGE UP (ref 0–0.9)
MONOCYTES NFR BLD AUTO: 0.5 % — LOW (ref 2–14)
NEUTROPHILS # BLD AUTO: 11.27 K/UL — HIGH (ref 1.8–7.4)
NEUTROPHILS NFR BLD AUTO: 91.3 % — HIGH (ref 43–77)
NRBC # FLD: 0 K/UL — SIGNIFICANT CHANGE UP (ref 0–0)
NRBC # FLD: 0.07 K/UL — SIGNIFICANT CHANGE UP (ref 0–0)
PLATELET # BLD AUTO: 67 K/UL — LOW (ref 150–400)
PLATELET # BLD AUTO: 69 K/UL — LOW (ref 150–400)
PMV BLD: 10.7 FL — SIGNIFICANT CHANGE UP (ref 7–13)
PMV BLD: 11.1 FL — SIGNIFICANT CHANGE UP (ref 7–13)
POTASSIUM SERPL-MCNC: 3.7 MMOL/L — SIGNIFICANT CHANGE UP (ref 3.5–5.3)
POTASSIUM SERPL-SCNC: 3.7 MMOL/L — SIGNIFICANT CHANGE UP (ref 3.5–5.3)
PROT SERPL-MCNC: 6 G/DL — SIGNIFICANT CHANGE UP (ref 6–8.3)
RBC # BLD: 2.53 M/UL — LOW (ref 4.2–5.8)
RBC # BLD: 2.6 M/UL — LOW (ref 4.2–5.8)
RBC # FLD: 13.7 % — SIGNIFICANT CHANGE UP (ref 10.3–14.5)
RBC # FLD: 13.8 % — SIGNIFICANT CHANGE UP (ref 10.3–14.5)
SODIUM SERPL-SCNC: 136 MMOL/L — SIGNIFICANT CHANGE UP (ref 135–145)
SPECIMEN SOURCE: SIGNIFICANT CHANGE UP
SPECIMEN SOURCE: SIGNIFICANT CHANGE UP
WBC # BLD: 12.1 K/UL — HIGH (ref 3.8–10.5)
WBC # BLD: 12.33 K/UL — HIGH (ref 3.8–10.5)
WBC # FLD AUTO: 12.1 K/UL — HIGH (ref 3.8–10.5)
WBC # FLD AUTO: 12.33 K/UL — HIGH (ref 3.8–10.5)

## 2019-08-01 PROCEDURE — 99497 ADVNCD CARE PLAN 30 MIN: CPT | Mod: 25

## 2019-08-01 PROCEDURE — 99223 1ST HOSP IP/OBS HIGH 75: CPT

## 2019-08-01 PROCEDURE — 73630 X-RAY EXAM OF FOOT: CPT | Mod: 26,50

## 2019-08-01 PROCEDURE — 99233 SBSQ HOSP IP/OBS HIGH 50: CPT

## 2019-08-01 PROCEDURE — G9001: CPT

## 2019-08-01 RX ORDER — SODIUM CHLORIDE 9 MG/ML
1000 INJECTION, SOLUTION INTRAVENOUS
Refills: 0 | Status: DISCONTINUED | OUTPATIENT
Start: 2019-08-01 | End: 2019-08-03

## 2019-08-01 RX ORDER — ACETAMINOPHEN 500 MG
650 TABLET ORAL ONCE
Refills: 0 | Status: COMPLETED | OUTPATIENT
Start: 2019-08-01 | End: 2019-08-01

## 2019-08-01 RX ADMIN — Medication 1: at 12:38

## 2019-08-01 RX ADMIN — Medication 2: at 17:46

## 2019-08-01 RX ADMIN — SIMVASTATIN 5 MILLIGRAM(S): 20 TABLET, FILM COATED ORAL at 22:52

## 2019-08-01 RX ADMIN — SODIUM CHLORIDE 100 MILLILITER(S): 9 INJECTION, SOLUTION INTRAVENOUS at 01:20

## 2019-08-01 RX ADMIN — PIPERACILLIN AND TAZOBACTAM 25 GRAM(S): 4; .5 INJECTION, POWDER, LYOPHILIZED, FOR SOLUTION INTRAVENOUS at 09:36

## 2019-08-01 RX ADMIN — INSULIN GLARGINE 20 UNIT(S): 100 INJECTION, SOLUTION SUBCUTANEOUS at 22:52

## 2019-08-01 RX ADMIN — PIPERACILLIN AND TAZOBACTAM 25 GRAM(S): 4; .5 INJECTION, POWDER, LYOPHILIZED, FOR SOLUTION INTRAVENOUS at 01:36

## 2019-08-01 RX ADMIN — SODIUM CHLORIDE 50 MILLILITER(S): 9 INJECTION, SOLUTION INTRAVENOUS at 13:00

## 2019-08-01 RX ADMIN — SODIUM CHLORIDE 100 MILLILITER(S): 9 INJECTION, SOLUTION INTRAVENOUS at 09:36

## 2019-08-01 RX ADMIN — SODIUM CHLORIDE 50 MILLILITER(S): 9 INJECTION, SOLUTION INTRAVENOUS at 22:52

## 2019-08-01 RX ADMIN — FAMOTIDINE 40 MILLIGRAM(S): 10 INJECTION INTRAVENOUS at 22:52

## 2019-08-01 RX ADMIN — Medication 650 MILLIGRAM(S): at 01:35

## 2019-08-01 RX ADMIN — Medication 650 MILLIGRAM(S): at 05:18

## 2019-08-01 RX ADMIN — PIPERACILLIN AND TAZOBACTAM 25 GRAM(S): 4; .5 INJECTION, POWDER, LYOPHILIZED, FOR SOLUTION INTRAVENOUS at 18:12

## 2019-08-01 NOTE — ADVANCED PRACTICE NURSE CONSULT - RECOMMEDATIONS
Recommend to continue with Podiatry recommendations, as per note will follow daily.    Left foot: Cleanse with NS, pat dry. Apply Liquid barrier film to periwound skin. Apply Aquacel AG hydrofiber, cover with gauze and secure with kerlix. Change daily.    Right toe: Cleanse with NS, pat dry. Apply Liquid barrier film to periwound skin. Apply Aquacel AG hydrofiber (to absorb drainage that was noted from wife and keep area dry), cover with gauze and secure with kerlix.    Continue low air loss bed therapy, continue to turn & reposition q2h, continue measures to decrease friction/shear/pressure.     Please call wound care service line is further assistance is needed ([phone]). Recommend to continue with Podiatry recommendations, as per note will follow daily.  Recommend diabetic education (chronic wound and increased HgA1C).    Left foot: Cleanse with NS, pat dry. Apply Liquid barrier film to periwound skin. Apply Aquacel AG hydrofiber, cover with gauze and secure with kerlix. Change daily.    Right toe: Cleanse with NS, pat dry. Apply Liquid barrier film to periwound skin. Apply Aquacel AG hydrofiber (to absorb drainage that was noted from wife and keep area dry), cover with gauze and secure with kerlix.    Continue low air loss bed therapy, continue to turn & reposition q2h, continue measures to decrease friction/shear/pressure.     Please call wound care service line is further assistance is needed ([phone]).

## 2019-08-01 NOTE — CONSULT NOTE ADULT - ASSESSMENT
64 yo M pt w/ b/l foot wounds   - pt seen and evaluated  - right foot plantar hallux wound probes to subQ w/ 1cc of pus expressed-performed excisional debridement to the level of but not beyond subcutaneous tissue, the deepest level excised was subcutaneous tissue, using a sterile #10 blade and a suture removal kit- after debridement, no further pus was expressed, wound does not track, has no undermining, and probes to subQ only, mild malodor was noted  - Left foot- s/p TMA w/ central wound w/ a granular base that probes to muscle/soft tissue- wound does not track, no undermining, no malodor noted, and no drainage expressed  - ordered b/l foot XRs, ESR, and CRP  - pod will follow  - discussed w/ attending

## 2019-08-01 NOTE — PHYSICAL THERAPY INITIAL EVALUATION ADULT - PATIENT PROFILE REVIEW, REHAB EVAL
No formal activity order; spoke with ARNALDO Domingo prior to PT evaluation--> Pt OK for PT consult/OOB activity/yes

## 2019-08-01 NOTE — DIETITIAN INITIAL EVALUATION ADULT. - OTHER INFO
Pt 64 yo man with PMHx of extensive small cell lung cancer, metastatic to bone, and brain; last chemotherapy was on Monday; admitted for management of suspected sepsis - per chart review. At time of visit Pt appears alert, oriented. Pt speaks Indonesian. This RDN speaks Indonesian as well. Pt's daughter @ bed side. Per Pt his appetite not well for past ~6-7 days, but prior to that Pt's appetite was fine. Pt does not like the hospital food reported. Pt's family brings food from home; Pt does better with home cooked food. Food preferences discussed; Pt agreed to try PO supplement: Glucerna shake. Pt not sure about his height or his weight; per Pt he lost weight PTA, but unable to quantify. Of note Pt's HbA1c level 10.2% (8/1). At home Pt avoids Regular sugar reported. Consistent Carbohydrate diet discussed with Pt including better food choices, foods to avoid; RDN answered questions/concerns related to diet; Pt verbalized understanding. RDN remains available, Pt made aware.

## 2019-08-01 NOTE — ADVANCED PRACTICE NURSE CONSULT - REASON FOR CONSULT
Patient seen on skin care rounds after wound care referral received for assessment of skin impairment and recommendations of topical management. Chart reviewed: Serum WBC 12.33, Hgb/Hct 7.9/23.8, HgbA1C 10.2%, Deandre 14, BMI 24.5kg/m2, wife interviewed- followed outpatient podiatrist, surgical wound of left foot for over 1 year. Patient H/O HLD, lung CA, DM, HTN. Patient admitted with sepsis. Seen by Podiatry services after wound care team saw patient, see podiatry consult note for details of assessment.

## 2019-08-01 NOTE — PHYSICAL THERAPY INITIAL EVALUATION ADULT - CRITERIA FOR SKILLED THERAPEUTIC INTERVENTIONS
predicted duration of therapy intervention/functional limitations in following categories/risk reduction/prevention/impairments found/therapy frequency/anticipated discharge recommendation/rehab potential

## 2019-08-01 NOTE — PROGRESS NOTE ADULT - PROBLEM SELECTOR PLAN 1
Leukocytosis, fever at home of 102.7, HR 95  Unclear source, negative UA, negative CXR, only complaining of urinary incontinence, dry cough, ddx also includes tumor fever from increased burden as shown by POD  - s/p IV vanco/zosyn  - continue IV LR @ 100 cc/hr  - continue IV zosyn for now  - RVP negative  - F/U blood and urine cx  - monitor CBC, vitals

## 2019-08-01 NOTE — PROGRESS NOTE ADULT - SUBJECTIVE AND OBJECTIVE BOX
Patient is a 63y old  Male who presents with a chief complaint of     SUBJECTIVE / OVERNIGHT EVENTS: Pt seen and examined. Chart reviewed. No complaints at this time. Denies any pain.     MEDICATIONS  (STANDING):  dextrose 5%. 1000 milliLiter(s) (50 mL/Hr) IV Continuous <Continuous>  dextrose 50% Injectable 12.5 Gram(s) IV Push once  dextrose 50% Injectable 25 Gram(s) IV Push once  dextrose 50% Injectable 25 Gram(s) IV Push once  famotidine    Tablet 40 milliGRAM(s) Oral at bedtime  insulin glargine Injectable (LANTUS) 20 Unit(s) SubCutaneous at bedtime  insulin lispro (HumaLOG) corrective regimen sliding scale   SubCutaneous three times a day before meals  lactated ringers. 1000 milliLiter(s) (100 mL/Hr) IV Continuous <Continuous>  piperacillin/tazobactam IVPB.. 3.375 Gram(s) IV Intermittent every 8 hours  simvastatin 5 milliGRAM(s) Oral at bedtime    MEDICATIONS  (PRN):  dextrose 40% Gel 15 Gram(s) Oral once PRN Blood Glucose LESS THAN 70 milliGRAM(s)/deciliter  glucagon  Injectable 1 milliGRAM(s) IntraMuscular once PRN Glucose LESS THAN 70 milligrams/deciliter      Vital Signs Last 24 Hrs  T(C): 36.7 (01 Aug 2019 12:22), Max: 38.2 (01 Aug 2019 01:00)  T(F): 98.1 (01 Aug 2019 12:22), Max: 100.8 (01 Aug 2019 01:00)  HR: 72 (01 Aug 2019 12:22) (72 - 109)  BP: 119/53 (01 Aug 2019 12:22) (99/61 - 122/63)  BP(mean): --  RR: 17 (01 Aug 2019 12:22) (17 - 24)  SpO2: 98% (01 Aug 2019 12:22) (98% - 100%)  CAPILLARY BLOOD GLUCOSE      POCT Blood Glucose.: 177 mg/dL (01 Aug 2019 12:21)  POCT Blood Glucose.: 78 mg/dL (01 Aug 2019 08:15)  POCT Blood Glucose.: 73 mg/dL (01 Aug 2019 07:38)  POCT Blood Glucose.: 71 mg/dL (01 Aug 2019 07:36)  POCT Blood Glucose.: 89 mg/dL (01 Aug 2019 01:19)  POCT Blood Glucose.: 111 mg/dL (2019 23:37)  POCT Blood Glucose.: 190 mg/dL (2019 18:40)  POCT Blood Glucose.: 234 mg/dL (2019 12:38)    I&O's Summary      PHYSICAL EXAM:  GENERAL: NAD, well-developed  HEAD:  Atraumatic, Normocephalic  EYES: EOMI, PERRLA, conjunctiva and sclera clear  NECK: Supple, No JVD  CHEST/LUNG: Clear to auscultation bilaterally; No wheeze  HEART: Regular rate and rhythm; No murmurs, rubs, or gallops  ABDOMEN: Soft, Nontender, Nondistended; Bowel sounds present  EXTREMITIES:  (+) left foot ulcer, no signs of infection   PSYCH: AAOx3  NEUROLOGY: non-focal  SKIN: No rashes or lesions    LABS:                        7.9    12.33 )-----------( 69       ( 01 Aug 2019 08:55 )             23.8         136  |  99  |  17  ----------------------------<  65<L>  3.7   |  24  |  1.29    Ca    8.0<L>      01 Aug 2019 05:35    TPro  6.0  /  Alb  2.7<L>  /  TBili  1.1  /  DBili  x   /  AST  43<H>  /  ALT  76<H>  /  AlkPhos  138<H>  08    Hemoglobin A1C, Whole Blood (19 @ 05:35)    Hemoglobin A1C, Whole Blood: 10.2: High Risk (prediabetic)    5.7 - 6.4 %  Diabetic, diagnostic           > 6.5 %  ADA diabetic treatment goal    < 7.0 %    HbA1C values may not accurately reflect mean blood glucose  in patients with Hb variants.  Suggest clinical correlation. %          Urinalysis Basic - ( 2019 14:57 )    Color: LIGHT YELLOW / Appearance: CLEAR / S.012 / pH: 6.0  Gluc: NEGATIVE / Ketone: NEGATIVE  / Bili: NEGATIVE / Urobili: NORMAL   Blood: NEGATIVE / Protein: 30 / Nitrite: NEGATIVE   Leuk Esterase: NEGATIVE / RBC: 3-5 / WBC 0-2   Sq Epi: OCC / Non Sq Epi: x / Bacteria: NEGATIVE        RADIOLOGY & ADDITIONAL TESTS:    Imaging Personally Reviewed:    Consultant(s) Notes Reviewed:      Care Discussed with Consultants/Other Providers:

## 2019-08-01 NOTE — PHYSICAL THERAPY INITIAL EVALUATION ADULT - GENERAL OBSERVATIONS, REHAB EVAL
Pt encountered in semisupine position, A&Ox3, with +IV, +left foot dressing dry/intact, and family @bedside.

## 2019-08-01 NOTE — DIETITIAN INITIAL EVALUATION ADULT. - DIET TYPE
supplement (specify)/consistent carbohydrate (no snacks)/Glucerna Therapeutic Nutrition 8oz. 2x daily (will provide additional ~440 Kcals, ~20 gm Protein);/regular/DASH/TLC (sodium and cholesterol restricted diet) PO diet

## 2019-08-01 NOTE — PHYSICAL THERAPY INITIAL EVALUATION ADULT - PERTINENT HX OF CURRENT PROBLEM, REHAB EVAL
62 y/o man with PMHx of extensive small cell lung cancer, metastatic to bone, and brain followed by Dr. Thao who presents with fever, chills, urinary incontinence and a dry cough, last chemotherapy was on Monday admitted for management of suspected sepsis. As per pt, pt had left 1-5 toes amputation 6/2019.

## 2019-08-01 NOTE — DIETITIAN INITIAL EVALUATION ADULT. - ADD RECOMMEND
1. Encourage & assist Pt with meals; Monitor PO diet tolerance; Honor food preferences;            2. Monitor labs, weights, hydration status;              3. Suggest outpatient follow up with an endocrinologist to ensure long-term DM diet comprehension and compliance. Suggest outpatient follow up with appropriate RDN for the purposes of long-term nutrition evaluation and diet education;

## 2019-08-01 NOTE — CONSULT NOTE ADULT - ASSESSMENT
63m with extensive stage, SCLC, s/p progression on carbo/etop and nivolumab, started on single agent gemcitabine, s/p GK to brain lesion, presenting with fever.   Has mild leukocytosis with left shift.  Pt has a chronic diabetic foot ulcer, has had this for more than 1 year.     -Monitor fever curve  -Infectious work up, further evaluation of the foot ulcer, 2/2 concern for OM. Consider imaging.   -Consider CT c.a.p if patient continues to be febrile and no OM  -Monitor liver tests and Cr  -Supportive care, pain control, Nutrition, PT, DVT ppx  -Outpatient oncology f/u    Will follow. Please do not hesitate to call back with questions.     Mine Manuel MD  Medical Oncology Attending  C: [phone] 63m with extensive stage, SCLC, s/p progression on carbo/etop and nivolumab, started on single agent gemcitabine, s/p GK to brain lesion, presenting with fever.   Has mild leukocytosis with left shift.  Pt has a chronic diabetic foot ulcer, has had this for more than 1 year.     -Monitor fever curve  -Infectious work up, further evaluation of the foot ulcer, 2/2 concern for OM. Consider imaging.   -Consider CT c.a.p if patient continues to be febrile and no OM  -Monitor plt count, thrombocytopenia in the setting of gemcitabine. Discontinue anticoagulation of plt<50k  -Monitor hgb, transfuse as needed  -Monitor liver tests and Cr  -Supportive care, pain control, Nutrition, PT, DVT ppx  -Outpatient oncology f/u    Will follow. Please do not hesitate to call back with questions.     Mine Manuel MD  Medical Oncology Attending  C: [phone]

## 2019-08-01 NOTE — PHYSICAL THERAPY INITIAL EVALUATION ADULT - ADDITIONAL COMMENTS
Pt lives in a house with ~4 steps to enter and a few steps inside home; (+)handrails. Pt reports being able to stay on first floor of home. Prior to hospital admission pt reports needing assistance in ADLs and denies previous history of falls. Prior to pt's surgery in 6/2019, pt ambulated independently, but has not walked since the surgery. Pt did not use or own an assistive device. Pt is in agreement to participate in PT services throughout hospital stay to improve strength and balance.    Pt left comfortable in bed, NAD, all lines intact, all precautions maintained, with call bell in reach, family @bedside, and RN Chayo aware of PT evaluation.

## 2019-08-01 NOTE — CONSULT NOTE ADULT - SUBJECTIVE AND OBJECTIVE BOX
Patient is a 63y old  Male who presents with a chief complaint of     HPI:  HPI:  62 y/o man with PMHx of extensive small cell lung cancer, metastatic to bone, and brain followed by Dr. Thao who presents fever, chills, urinary incontinence and a dry cough. History is obtained by his daughter Lin who is his health-care proxy and insisted on translation. Daughter reports the patient had a fever of 102.7 and has been very weak with chills. Patient denies diarrhea, nausea or vomiting. No sick contacts or recent travel, no CP or dyspnea. Last chemotherapy was on Monday, no new medications/recent antibiotic use.   Onc Hx:  Patient recently emigrated to the  from [facility] 2018 diagnosed 2018 with small cell carcinoma s/p Etoposide/Carboplatin 3/8/19, 2019-brain MRI showed a new tiny enhancing lesion in the left frontal subcortical region compatible with metastasis. Patient underwent gamma knife radiosurgery, completing 2019. 2019-Began Nivolumab, 2019-CT scan chest/abdomen/pelvis-new left upper lobe satellite nodules with increased left mediastinal and left hilar adenopathy, consistent with disease progression. Interval pathologic fracture of right posterior sixth rib, otherwise stable osseous metastases.   In the ED patient was found to be normotensive, satting 100% on RA, afebrile. Labs notable fro a leukocytosis of 11.92, platelets of 71, previously 107. CXR neg, UA neg. Given IV vanco, zosyn, admitted for further management.     PAST MEDICAL & SURGICAL HISTORY:  Lung cancer  Hyperlipidemia  Diabetes mellitus  Hypertension  History of foot surgery      Review of Systems:   CONSTITUTIONAL: No fever, weight loss, or fatigue  EYES: No eye pain, visual disturbances, or discharge  ENMT:  No difficulty hearing, tinnitus, vertigo; No sinus or throat pain  NECK: No pain or stiffness  BREASTS: No pain, masses, or nipple discharge  RESPIRATORY: No cough, wheezing, chills or hemoptysis; No shortness of breath  CARDIOVASCULAR: No chest pain, palpitations, dizziness, or leg swelling  GASTROINTESTINAL: No abdominal or epigastric pain. No nausea, vomiting, or hematemesis; No diarrhea or constipation. No melena or hematochezia.  GENITOURINARY: No dysuria, frequency, hematuria, or incontinence  NEUROLOGICAL: No headaches, memory loss, loss of strength, numbness, or tremors  SKIN: No itching, burning, rashes, or lesions   LYMPH NODES: No enlarged glands  ENDOCRINE: No heat or cold intolerance; No hair loss  MUSCULOSKELETAL: No joint pain or swelling; No muscle, back, or extremity pain  PSYCHIATRIC: No depression, anxiety, mood swings, or difficulty sleeping  HEME/LYMPH: No easy bruising, or bleeding gums  ALLERGY AND IMMUNOLOGIC: No hives or eczema    Allergies    No Known Allergies    Intolerances    Social History:   Former smoker    · 1PPD x 14 years-stopped in his 40's  Four children   · 3 living children-daughter helps in patient care; 1 son in [facility] and 1 son in [place]    No alcohol use  No illicit drug use  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    FAMILY HISTORY:  No pertinent family history in first degree relatives      MEDICATIONS  (STANDING):    MEDICATIONS  (PRN):      T(C): 36.7 (19 @ 15:54), Max: 36.8 (19 @ 12:57)  HR: 81 (19 @ 15:54) (81 - 109)  BP: 122/63 (19 @ 15:54) (99/61 - 122/63)  RR: 18 (19 @ 15:54) (18 - 24)  SpO2: 100% (19 @ 15:54) (99% - 100%)    CAPILLARY BLOOD GLUCOSE      POCT Blood Glucose.: 234 mg/dL (2019 12:38)    I&O's Summary      PHYSICAL EXAM:  GENERAL: NAD, appears ill, cooperative with exam  HEAD:  Atraumatic, Normocephalic  EYES: EOMI, PERRLA, conjunctiva and sclera clear  NECK: Supple, No elevated JVD  CHEST/LUNG: Diminished at bases; No wheeze  HEART: Regular rate and rhythm; No murmurs, rubs, or gallops  ABDOMEN: Soft, Nontender, Nondistended; Bowel sounds present  EXTREMITIES:  dc foot ulcer on left  PSYCH: AAOx3  NEUROLOGY: CN II-XII grossly intact, moving all extremities  SKIN: No rashes or lesions    LABS:                        9.1    11.92 )-----------( 71       ( 2019 14:02 )             27.1     07-31    133<L>  |  96<L>  |  24<H>  ----------------------------<  207<H>  3.9   |  26  |  1.56<H>    Ca    8.9      2019 13:52    TPro  6.9  /  Alb  3.4  /  TBili  0.8  /  DBili  x   /  AST  73<H>  /  ALT  111<H>  /  AlkPhos  172<H>  07-31          Urinalysis Basic - ( 2019 14:57 )    Color: LIGHT YELLOW / Appearance: CLEAR / S.012 / pH: 6.0  Gluc: NEGATIVE / Ketone: NEGATIVE  / Bili: NEGATIVE / Urobili: NORMAL   Blood: NEGATIVE / Protein: 30 / Nitrite: NEGATIVE   Leuk Esterase: NEGATIVE / RBC: 3-5 / WBC 0-2   Sq Epi: OCC / Non Sq Epi: x / Bacteria: NEGATIVE          RADIOLOGY & ADDITIONAL TESTS:    ECG Personally Reviewed -     Imaging Personally Reviewed:    Consultant(s) Notes Reviewed:      Care Discussed with Consultants/Other Providers: (2019 17:23)       Oncologic History:      ROS: as above     PAST MEDICAL & SURGICAL HISTORY:  Lung cancer  Hyperlipidemia  Diabetes mellitus  Hypertension  History of foot surgery      SOCIAL HISTORY:    FAMILY HISTORY:  No pertinent family history in first degree relatives      MEDICATIONS  (STANDING):  dextrose 5%. 1000 milliLiter(s) (50 mL/Hr) IV Continuous <Continuous>  dextrose 50% Injectable 12.5 Gram(s) IV Push once  dextrose 50% Injectable 25 Gram(s) IV Push once  dextrose 50% Injectable 25 Gram(s) IV Push once  famotidine    Tablet 40 milliGRAM(s) Oral at bedtime  insulin glargine Injectable (LANTUS) 20 Unit(s) SubCutaneous at bedtime  insulin lispro (HumaLOG) corrective regimen sliding scale   SubCutaneous three times a day before meals  lactated ringers. 1000 milliLiter(s) (50 mL/Hr) IV Continuous <Continuous>  piperacillin/tazobactam IVPB.. 3.375 Gram(s) IV Intermittent every 8 hours  simvastatin 5 milliGRAM(s) Oral at bedtime    MEDICATIONS  (PRN):  dextrose 40% Gel 15 Gram(s) Oral once PRN Blood Glucose LESS THAN 70 milliGRAM(s)/deciliter  glucagon  Injectable 1 milliGRAM(s) IntraMuscular once PRN Glucose LESS THAN 70 milligrams/deciliter      Allergies    No Known Allergies    Intolerances        Vital Signs Last 24 Hrs  T(C): 36.7 (01 Aug 2019 12:22), Max: 38.2 (01 Aug 2019 01:00)  T(F): 98.1 (01 Aug 2019 12:22), Max: 100.8 (01 Aug 2019 01:00)  HR: 72 (01 Aug 2019 12:22) (72 - 86)  BP: 119/53 (01 Aug 2019 12:22) (111/52 - 119/53)  BP(mean): --  RR: 17 (01 Aug 2019 12:22) (17 - 18)  SpO2: 98% (01 Aug 2019 12:22) (98% - 100%)    PHYSICAL EXAM  General: adult in NAD  HEENT: clear oropharynx, anicteric sclera, pink conjunctiva  Neck: supple  CV: normal S1/S2 with no murmur rubs or gallops  Lungs: positive air movement b/l ant lungs, clear to auscultation, no wheezes, no rales  Abdomen: soft non-tender non-distended, no hepatosplenomegaly  Ext: no clubbing cyanosis or edema  Skin: no rashes and no petechiae  Neuro: alert and oriented X 3, none focal    LABS:                          7.9    12. )-----------( 69       ( 01 Aug 2019 08:55 )             23.8         Mean Cell Volume : 91.5 fL  Mean Cell Hemoglobin : 30.4 pg  Mean Cell Hemoglobin Concentration : 33.2 %  Auto Neutrophil # : 11.27 K/uL  Auto Lymphocyte # : 0.76 K/uL  Auto Monocyte # : 0.06 K/uL  Auto Eosinophil # : 0.05 K/uL  Auto Basophil # : 0.01 K/uL  Auto Neutrophil % : 91.3 %  Auto Lymphocyte % : 6.2 %  Auto Monocyte % : 0.5 %  Auto Eosinophil % : 0.4 %  Auto Basophil % : 0.1 %      Serial CBC's   @ 08:55  Hct-23.8 / Hgb-7.9 / Plat-69 / RBC-2.60 / WBC-12.33  Serial CBC's   @ 05:35  Hct-23.3 / Hgb-7.6 / Plat-67 / RBC-2.53 / WBC-12.10  Serial CBC's   @ 14:02  Hct-27.1 / Hgb-9.1 / Plat-71 / RBC-2.95 / WBC-11.92  Serial CBC's   @ 10:41  Hct-29.3 / Hgb-10.2 / Plat-107 / RBC-3.17 / WBC-4.9          136  |  99  |  17  ----------------------------<  65<L>  3.7   |  24  |  1.29    Ca    8.0<L>      01 Aug 2019 05:35    TPro  6.0  /  Alb  2.7<L>  /  TBili  1.1  /  DBili  x   /  AST  43<H>  /  ALT  76<H>  /  AlkPhos  138<H>                        RADIOLOGY & ADDITIONAL STUDIES:

## 2019-08-01 NOTE — CONSULT NOTE ADULT - SUBJECTIVE AND OBJECTIVE BOX
Podiatry pager #: [phone] ([place])/ [phone] ([facility])    Patient is a 63y old  Male who presents with a chief complaint of     HPI:  HPI:  62 y/o man with PMHx of extensive small cell lung cancer, metastatic to bone, and brain followed by Dr. Thao who presents fever, chills, urinary incontinence and a dry cough. History is obtained by his daughter Lin who is his health-care proxy and insisted on translation. Daughter reports the patient had a fever of 102.7 and has been very weak with chills. Patient denies diarrhea, nausea or vomiting. No sick contacts or recent travel, no CP or dyspnea. Last chemotherapy was on Monday, no new medications/recent antibiotic use.   Onc Hx:  Patient recently emigrated to the  from [facility] 2018 diagnosed 2018 with small cell carcinoma s/p Etoposide/Carboplatin 3/8/19, 2019-brain MRI showed a new tiny enhancing lesion in the left frontal subcortical region compatible with metastasis. Patient underwent gamma knife radiosurgery, completing 2019. 2019-Began Nivolumab, 2019-CT scan chest/abdomen/pelvis-new left upper lobe satellite nodules with increased left mediastinal and left hilar adenopathy, consistent with disease progression. Interval pathologic fracture of right posterior sixth rib, otherwise stable osseous metastases.   In the ED patient was found to be normotensive, satting 100% on RA, afebrile. Labs notable fro a leukocytosis of 11.92, platelets of 71, previously 107. CXR neg, UA neg. Given IV vanco, zosyn, admitted for further management.     PAST MEDICAL & SURGICAL HISTORY:  Lung cancer  Hyperlipidemia  Diabetes mellitus  Hypertension  History of foot surgery      Review of Systems:   CONSTITUTIONAL: No fever, weight loss, or fatigue  EYES: No eye pain, visual disturbances, or discharge  ENMT:  No difficulty hearing, tinnitus, vertigo; No sinus or throat pain  NECK: No pain or stiffness  BREASTS: No pain, masses, or nipple discharge  RESPIRATORY: No cough, wheezing, chills or hemoptysis; No shortness of breath  CARDIOVASCULAR: No chest pain, palpitations, dizziness, or leg swelling  GASTROINTESTINAL: No abdominal or epigastric pain. No nausea, vomiting, or hematemesis; No diarrhea or constipation. No melena or hematochezia.  GENITOURINARY: No dysuria, frequency, hematuria, or incontinence  NEUROLOGICAL: No headaches, memory loss, loss of strength, numbness, or tremors  SKIN: No itching, burning, rashes, or lesions   LYMPH NODES: No enlarged glands  ENDOCRINE: No heat or cold intolerance; No hair loss  MUSCULOSKELETAL: No joint pain or swelling; No muscle, back, or extremity pain  PSYCHIATRIC: No depression, anxiety, mood swings, or difficulty sleeping  HEME/LYMPH: No easy bruising, or bleeding gums  ALLERGY AND IMMUNOLOGIC: No hives or eczema    Allergies    No Known Allergies    Intolerances    Social History:   Former smoker    · 1PPD x 14 years-stopped in his 40's  Four children   · 3 living children-daughter helps in patient care; 1 son in [place] and 1 son in [place]    No alcohol use  No illicit drug use  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    FAMILY HISTORY:  No pertinent family history in first degree relatives      MEDICATIONS  (STANDING):    MEDICATIONS  (PRN):      T(C): 36.7 (19 @ 15:54), Max: 36.8 (19 @ 12:57)  HR: 81 (19 @ 15:54) (81 - 109)  BP: 122/63 (19 @ 15:54) (99/61 - 122/63)  RR: 18 (19 @ 15:54) (18 - 24)  SpO2: 100% (19 @ 15:54) (99% - 100%)    CAPILLARY BLOOD GLUCOSE      POCT Blood Glucose.: 234 mg/dL (2019 12:38)    I&O's Summary      PHYSICAL EXAM:  GENERAL: NAD, appears ill, cooperative with exam  HEAD:  Atraumatic, Normocephalic  EYES: EOMI, PERRLA, conjunctiva and sclera clear  NECK: Supple, No elevated JVD  CHEST/LUNG: Diminished at bases; No wheeze  HEART: Regular rate and rhythm; No murmurs, rubs, or gallops  ABDOMEN: Soft, Nontender, Nondistended; Bowel sounds present  EXTREMITIES:  2+ Peripheral Pulses, No clubbing, cyanosis, or edema  PSYCH: AAOx3  NEUROLOGY: CN II-XII grossly intact, moving all extremities  SKIN: No rashes or lesions    LABS:                        9.1    11.92 )-----------( 71       ( 2019 14:02 )             27.1         133<L>  |  96<L>  |  24<H>  ----------------------------<  207<H>  3.9   |  26  |  1.56<H>    Ca    8.9      2019 13:52    TPro  6.9  /  Alb  3.4  /  TBili  0.8  /  DBili  x   /  AST  73<H>  /  ALT  111<H>  /  AlkPhos  172<H>            Urinalysis Basic - ( 2019 14:57 )    Color: LIGHT YELLOW / Appearance: CLEAR / S.012 / pH: 6.0  Gluc: NEGATIVE / Ketone: NEGATIVE  / Bili: NEGATIVE / Urobili: NORMAL   Blood: NEGATIVE / Protein: 30 / Nitrite: NEGATIVE   Leuk Esterase: NEGATIVE / RBC: 3-5 / WBC 0-2   Sq Epi: OCC / Non Sq Epi: x / Bacteria: NEGATIVE          RADIOLOGY & ADDITIONAL TESTS:    ECG Personally Reviewed -     Imaging Personally Reviewed:    Consultant(s) Notes Reviewed:      Care Discussed with Consultants/Other Providers: (2019 17:23)      PAST MEDICAL & SURGICAL HISTORY:  Lung cancer  Hyperlipidemia  Diabetes mellitus  Hypertension  History of foot surgery      MEDICATIONS  (STANDING):  dextrose 5%. 1000 milliLiter(s) (50 mL/Hr) IV Continuous <Continuous>  dextrose 50% Injectable 12.5 Gram(s) IV Push once  dextrose 50% Injectable 25 Gram(s) IV Push once  dextrose 50% Injectable 25 Gram(s) IV Push once  famotidine    Tablet 40 milliGRAM(s) Oral at bedtime  insulin glargine Injectable (LANTUS) 20 Unit(s) SubCutaneous at bedtime  insulin lispro (HumaLOG) corrective regimen sliding scale   SubCutaneous three times a day before meals  lactated ringers. 1000 milliLiter(s) (50 mL/Hr) IV Continuous <Continuous>  piperacillin/tazobactam IVPB.. 3.375 Gram(s) IV Intermittent every 8 hours  simvastatin 5 milliGRAM(s) Oral at bedtime    MEDICATIONS  (PRN):  dextrose 40% Gel 15 Gram(s) Oral once PRN Blood Glucose LESS THAN 70 milliGRAM(s)/deciliter  glucagon  Injectable 1 milliGRAM(s) IntraMuscular once PRN Glucose LESS THAN 70 milligrams/deciliter      Allergies    No Known Allergies    Intolerances        VITALS:    Vital Signs Last 24 Hrs  T(C): 36.7 (01 Aug 2019 12:22), Max: 38.2 (01 Aug 2019 01:00)  T(F): 98.1 (01 Aug 2019 12:22), Max: 100.8 (01 Aug 2019 01:00)  HR: 72 (01 Aug 2019 12:22) (72 - 86)  BP: 119/53 (01 Aug 2019 12:22) (111/52 - 122/63)  BP(mean): --  RR: 17 (01 Aug 2019 12:22) (17 - 18)  SpO2: 98% (01 Aug 2019 12:22) (98% - 100%)    LABS:                          7.9    12.33 )-----------( 69       ( 01 Aug 2019 08:55 )             23.8       08    136  |  99  |  17  ----------------------------<  65<L>  3.7   |  24  |  1.29    Ca    8.0<L>      01 Aug 2019 05:35    TPro  6.0  /  Alb  2.7<L>  /  TBili  1.1  /  DBili  x   /  AST  43<H>  /  ALT  76<H>  /  AlkPhos  138<H>  08      CAPILLARY BLOOD GLUCOSE      POCT Blood Glucose.: 177 mg/dL (01 Aug 2019 12:21)  POCT Blood Glucose.: 78 mg/dL (01 Aug 2019 08:15)  POCT Blood Glucose.: 73 mg/dL (01 Aug 2019 07:38)  POCT Blood Glucose.: 71 mg/dL (01 Aug 2019 07:36)  POCT Blood Glucose.: 89 mg/dL (01 Aug 2019 01:19)  POCT Blood Glucose.: 111 mg/dL (2019 23:37)  POCT Blood Glucose.: 190 mg/dL (2019 18:40)          LOWER EXTREMITY PHYSICAL EXAM:    Vascular: DP/PT 2/4 B/L, CFT <3 seconds R, Temperature gradient warm to cool B/L  Musculoskeletal/Ortho: TMA of left foot  Skin:  Wound #1:   Location: midfoot left foot centrally  Size: 2cm x 4 cm  Depth: probes down to muscle, does not track, does not undermine  Wound bed: granular  Drainage: none  Odor: none  Periwound: fibrous    Wound #2:   Location: right foot plantar distal hallux wound  Size: 2mm x 2 mm  Depth: probes down to subQ, does not track, does not undermine  Wound bed: granular  Drainage: 1 cc of pus  Odor: mildly malodorous  Periwound: fibrous    RADIOLOGY & ADDITIONAL STUDIES: Podiatry pager #: [phone] ([place])/ [phone] ([facility])    Patient is a 63y old  Male who presents with a chief complaint of     HPI:  HPI:  62 y/o man with PMHx of extensive small cell lung cancer, metastatic to bone, and brain followed by Dr. Thao who presents fever, chills, urinary incontinence and a dry cough. History is obtained by his daughter Lin who is his health-care proxy and insisted on translation. Daughter reports the patient had a fever of 102.7 and has been very weak with chills. Patient denies diarrhea, nausea or vomiting. No sick contacts or recent travel, no CP or dyspnea. Last chemotherapy was on Monday, no new medications/recent antibiotic use.   Onc Hx:  Patient recently emigrated to the  from [facility] 2018 diagnosed 2018 with small cell carcinoma s/p Etoposide/Carboplatin 3/8/19, 2019-brain MRI showed a new tiny enhancing lesion in the left frontal subcortical region compatible with metastasis. Patient underwent gamma knife radiosurgery, completing 2019. 2019-Began Nivolumab, 2019-CT scan chest/abdomen/pelvis-new left upper lobe satellite nodules with increased left mediastinal and left hilar adenopathy, consistent with disease progression. Interval pathologic fracture of right posterior sixth rib, otherwise stable osseous metastases.   In the ED patient was found to be normotensive, satting 100% on RA, afebrile. Labs notable fro a leukocytosis of 11.92, platelets of 71, previously 107. CXR neg, UA neg. Given IV vanco, zosyn, admitted for further management.     PAST MEDICAL & SURGICAL HISTORY:  Lung cancer  Hyperlipidemia  Diabetes mellitus  Hypertension  History of foot surgery      Review of Systems:   CONSTITUTIONAL: No fever, weight loss, or fatigue  EYES: No eye pain, visual disturbances, or discharge  ENMT:  No difficulty hearing, tinnitus, vertigo; No sinus or throat pain  NECK: No pain or stiffness  BREASTS: No pain, masses, or nipple discharge  RESPIRATORY: No cough, wheezing, chills or hemoptysis; No shortness of breath  CARDIOVASCULAR: No chest pain, palpitations, dizziness, or leg swelling  GASTROINTESTINAL: No abdominal or epigastric pain. No nausea, vomiting, or hematemesis; No diarrhea or constipation. No melena or hematochezia.  GENITOURINARY: No dysuria, frequency, hematuria, or incontinence  NEUROLOGICAL: No headaches, memory loss, loss of strength, numbness, or tremors  SKIN: No itching, burning, rashes, or lesions   LYMPH NODES: No enlarged glands  ENDOCRINE: No heat or cold intolerance; No hair loss  MUSCULOSKELETAL: No joint pain or swelling; No muscle, back, or extremity pain  PSYCHIATRIC: No depression, anxiety, mood swings, or difficulty sleeping  HEME/LYMPH: No easy bruising, or bleeding gums  ALLERGY AND IMMUNOLOGIC: No hives or eczema    Allergies    No Known Allergies    Intolerances    Social History:   Former smoker    · 1PPD x 14 years-stopped in his 40's  Four children   · 3 living children-daughter helps in patient care; 1 son in [place] and 1 son in [place]    No alcohol use  No illicit drug use  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    FAMILY HISTORY:  No pertinent family history in first degree relatives      MEDICATIONS  (STANDING):    MEDICATIONS  (PRN):      T(C): 36.7 (19 @ 15:54), Max: 36.8 (19 @ 12:57)  HR: 81 (19 @ 15:54) (81 - 109)  BP: 122/63 (19 @ 15:54) (99/61 - 122/63)  RR: 18 (19 @ 15:54) (18 - 24)  SpO2: 100% (19 @ 15:54) (99% - 100%)    CAPILLARY BLOOD GLUCOSE      POCT Blood Glucose.: 234 mg/dL (2019 12:38)    I&O's Summary      PHYSICAL EXAM:  GENERAL: NAD, appears ill, cooperative with exam  HEAD:  Atraumatic, Normocephalic  EYES: EOMI, PERRLA, conjunctiva and sclera clear  NECK: Supple, No elevated JVD  CHEST/LUNG: Diminished at bases; No wheeze  HEART: Regular rate and rhythm; No murmurs, rubs, or gallops  ABDOMEN: Soft, Nontender, Nondistended; Bowel sounds present  EXTREMITIES:  2+ Peripheral Pulses, No clubbing, cyanosis, or edema  PSYCH: AAOx3  NEUROLOGY: CN II-XII grossly intact, moving all extremities  SKIN: No rashes or lesions    LABS:                        9.1    11.92 )-----------( 71       ( 2019 14:02 )             27.1         133<L>  |  96<L>  |  24<H>  ----------------------------<  207<H>  3.9   |  26  |  1.56<H>    Ca    8.9      2019 13:52    TPro  6.9  /  Alb  3.4  /  TBili  0.8  /  DBili  x   /  AST  73<H>  /  ALT  111<H>  /  AlkPhos  172<H>            Urinalysis Basic - ( 2019 14:57 )    Color: LIGHT YELLOW / Appearance: CLEAR / S.012 / pH: 6.0  Gluc: NEGATIVE / Ketone: NEGATIVE  / Bili: NEGATIVE / Urobili: NORMAL   Blood: NEGATIVE / Protein: 30 / Nitrite: NEGATIVE   Leuk Esterase: NEGATIVE / RBC: 3-5 / WBC 0-2   Sq Epi: OCC / Non Sq Epi: x / Bacteria: NEGATIVE          RADIOLOGY & ADDITIONAL TESTS:    ECG Personally Reviewed -     Imaging Personally Reviewed:    Consultant(s) Notes Reviewed:      Care Discussed with Consultants/Other Providers: (2019 17:23)      PAST MEDICAL & SURGICAL HISTORY:  Lung cancer  Hyperlipidemia  Diabetes mellitus  Hypertension  History of foot surgery      MEDICATIONS  (STANDING):  dextrose 5%. 1000 milliLiter(s) (50 mL/Hr) IV Continuous <Continuous>  dextrose 50% Injectable 12.5 Gram(s) IV Push once  dextrose 50% Injectable 25 Gram(s) IV Push once  dextrose 50% Injectable 25 Gram(s) IV Push once  famotidine    Tablet 40 milliGRAM(s) Oral at bedtime  insulin glargine Injectable (LANTUS) 20 Unit(s) SubCutaneous at bedtime  insulin lispro (HumaLOG) corrective regimen sliding scale   SubCutaneous three times a day before meals  lactated ringers. 1000 milliLiter(s) (50 mL/Hr) IV Continuous <Continuous>  piperacillin/tazobactam IVPB.. 3.375 Gram(s) IV Intermittent every 8 hours  simvastatin 5 milliGRAM(s) Oral at bedtime    MEDICATIONS  (PRN):  dextrose 40% Gel 15 Gram(s) Oral once PRN Blood Glucose LESS THAN 70 milliGRAM(s)/deciliter  glucagon  Injectable 1 milliGRAM(s) IntraMuscular once PRN Glucose LESS THAN 70 milligrams/deciliter      Allergies    No Known Allergies    Intolerances        VITALS:    Vital Signs Last 24 Hrs  T(C): 36.7 (01 Aug 2019 12:22), Max: 38.2 (01 Aug 2019 01:00)  T(F): 98.1 (01 Aug 2019 12:22), Max: 100.8 (01 Aug 2019 01:00)  HR: 72 (01 Aug 2019 12:22) (72 - 86)  BP: 119/53 (01 Aug 2019 12:22) (111/52 - 122/63)  BP(mean): --  RR: 17 (01 Aug 2019 12:22) (17 - 18)  SpO2: 98% (01 Aug 2019 12:22) (98% - 100%)    LABS:                          7.9    12.33 )-----------( 69       ( 01 Aug 2019 08:55 )             23.8       08    136  |  99  |  17  ----------------------------<  65<L>  3.7   |  24  |  1.29    Ca    8.0<L>      01 Aug 2019 05:35    TPro  6.0  /  Alb  2.7<L>  /  TBili  1.1  /  DBili  x   /  AST  43<H>  /  ALT  76<H>  /  AlkPhos  138<H>  08      CAPILLARY BLOOD GLUCOSE      POCT Blood Glucose.: 177 mg/dL (01 Aug 2019 12:21)  POCT Blood Glucose.: 78 mg/dL (01 Aug 2019 08:15)  POCT Blood Glucose.: 73 mg/dL (01 Aug 2019 07:38)  POCT Blood Glucose.: 71 mg/dL (01 Aug 2019 07:36)  POCT Blood Glucose.: 89 mg/dL (01 Aug 2019 01:19)  POCT Blood Glucose.: 111 mg/dL (2019 23:37)  POCT Blood Glucose.: 190 mg/dL (2019 18:40)          LOWER EXTREMITY PHYSICAL EXAM:    Vascular: DP/PT 2/4 B/L, CFT <3 seconds R, Temperature gradient warm to cool B/L  Musculoskeletal/Ortho: TMA of left foot  Skin:  Wound #1:   Location: midfoot left foot centrally  Size: 2cm x 4 cm  Depth: probes down to subQ, does not track, does not undermine  Wound bed: granular  Drainage: none  Odor: none  Periwound: fibrous    Wound #2:   Location: right foot plantar distal hallux wound  Size: 2mm x 2 mm  Depth: probes down to subQ, does not track, does not undermine  Wound bed: granular  Drainage: 1 cc of pus  Odor: mildly malodorous  Periwound: fibrous    RADIOLOGY & ADDITIONAL STUDIES:

## 2019-08-01 NOTE — ADVANCED PRACTICE NURSE CONSULT - ASSESSMENT
Left foot, chronic surgical non healing wound: measures 2not4xfd0.2cm, wound borders are irregular. Wound base 30% exposed muscle, 70% flat, agranular tissue, pale pink, moist. Periwound skin with circumferential callus noted and an area of thick/ hyperpigmented callus at  3 o'clock. Moderate amount of serous drainage, no odor. Unable to express drainage with palpation. No induration, no increased warmth, no erythema. Areas of hyperpigmentation noted on plantar foot. goal of care: decrease/control bioburden, manage exudate.    Right plantar aspect of great toe with area of 0.5cmx0.5cm intact scab, unable to express drainage during assessment, however patient wife reported "pus" from area that she was able to express and recommended podiatry consult (see consult note for details of assessment).

## 2019-08-01 NOTE — DIETITIAN INITIAL EVALUATION ADULT. - PERTINENT LABORATORY DATA
(8/1) WBC 12.33 H, H/H 7.9/23.8 L, PLT 69 L, Glu 65 L, Albumin 2.7 L, AST 43 H,  H, ALT 76 H, HbA1c 10.2% H

## 2019-08-02 DIAGNOSIS — R33.9 RETENTION OF URINE, UNSPECIFIED: ICD-10-CM

## 2019-08-02 LAB
ALBUMIN SERPL ELPH-MCNC: 2.7 G/DL — LOW (ref 3.3–5)
ALP SERPL-CCNC: 135 U/L — HIGH (ref 40–120)
ALT FLD-CCNC: 63 U/L — HIGH (ref 4–41)
ANION GAP SERPL CALC-SCNC: 13 MMO/L — SIGNIFICANT CHANGE UP (ref 7–14)
ANISOCYTOSIS BLD QL: SLIGHT — SIGNIFICANT CHANGE UP
APPEARANCE UR: CLEAR — SIGNIFICANT CHANGE UP
AST SERPL-CCNC: 31 U/L — SIGNIFICANT CHANGE UP (ref 4–40)
BACTERIA # UR AUTO: NEGATIVE — SIGNIFICANT CHANGE UP
BACTERIA UR CULT: SIGNIFICANT CHANGE UP
BASOPHILS # BLD AUTO: 0 K/UL — SIGNIFICANT CHANGE UP (ref 0–0.2)
BASOPHILS NFR BLD AUTO: 0 % — SIGNIFICANT CHANGE UP (ref 0–2)
BASOPHILS NFR SPEC: 0.9 % — SIGNIFICANT CHANGE UP (ref 0–2)
BILIRUB DIRECT SERPL-MCNC: < 0.2 MG/DL — SIGNIFICANT CHANGE UP (ref 0.1–0.2)
BILIRUB SERPL-MCNC: 0.5 MG/DL — SIGNIFICANT CHANGE UP (ref 0.2–1.2)
BILIRUB UR-MCNC: NEGATIVE — SIGNIFICANT CHANGE UP
BLASTS # FLD: 0 % — SIGNIFICANT CHANGE UP (ref 0–0)
BLOOD UR QL VISUAL: SIGNIFICANT CHANGE UP
BUN SERPL-MCNC: 12 MG/DL — SIGNIFICANT CHANGE UP (ref 7–23)
CALCIUM SERPL-MCNC: 8.2 MG/DL — LOW (ref 8.4–10.5)
CHLORIDE SERPL-SCNC: 97 MMOL/L — LOW (ref 98–107)
CO2 SERPL-SCNC: 25 MMOL/L — SIGNIFICANT CHANGE UP (ref 22–31)
COLOR SPEC: SIGNIFICANT CHANGE UP
CREAT SERPL-MCNC: 1.23 MG/DL — SIGNIFICANT CHANGE UP (ref 0.5–1.3)
EOSINOPHIL # BLD AUTO: 0.1 K/UL — SIGNIFICANT CHANGE UP (ref 0–0.5)
EOSINOPHIL NFR BLD AUTO: 1.7 % — SIGNIFICANT CHANGE UP (ref 0–6)
EOSINOPHIL NFR FLD: 1.7 % — SIGNIFICANT CHANGE UP (ref 0–6)
GIANT PLATELETS BLD QL SMEAR: PRESENT — SIGNIFICANT CHANGE UP
GLUCOSE BLDC GLUCOMTR-MCNC: 199 MG/DL — HIGH (ref 70–99)
GLUCOSE BLDC GLUCOMTR-MCNC: 211 MG/DL — HIGH (ref 70–99)
GLUCOSE BLDC GLUCOMTR-MCNC: 278 MG/DL — HIGH (ref 70–99)
GLUCOSE BLDC GLUCOMTR-MCNC: 308 MG/DL — HIGH (ref 70–99)
GLUCOSE SERPL-MCNC: 322 MG/DL — HIGH (ref 70–99)
GLUCOSE UR-MCNC: NEGATIVE — SIGNIFICANT CHANGE UP
HCT VFR BLD CALC: 22.7 % — LOW (ref 39–50)
HGB BLD-MCNC: 7.6 G/DL — LOW (ref 13–17)
HYALINE CASTS # UR AUTO: NEGATIVE — SIGNIFICANT CHANGE UP
IMM GRANULOCYTES NFR BLD AUTO: 1 % — SIGNIFICANT CHANGE UP (ref 0–1.5)
KETONES UR-MCNC: NEGATIVE — SIGNIFICANT CHANGE UP
LEUKOCYTE ESTERASE UR-ACNC: NEGATIVE — SIGNIFICANT CHANGE UP
LYMPHOCYTES # BLD AUTO: 0.69 K/UL — LOW (ref 1–3.3)
LYMPHOCYTES # BLD AUTO: 11.7 % — LOW (ref 13–44)
LYMPHOCYTES NFR SPEC AUTO: 6.9 % — LOW (ref 13–44)
MACROCYTES BLD QL: SLIGHT — SIGNIFICANT CHANGE UP
MAGNESIUM SERPL-MCNC: 1.5 MG/DL — LOW (ref 1.6–2.6)
MCHC RBC-ENTMCNC: 31.4 PG — SIGNIFICANT CHANGE UP (ref 27–34)
MCHC RBC-ENTMCNC: 33.5 % — SIGNIFICANT CHANGE UP (ref 32–36)
MCV RBC AUTO: 93.8 FL — SIGNIFICANT CHANGE UP (ref 80–100)
METAMYELOCYTES # FLD: 0 % — SIGNIFICANT CHANGE UP (ref 0–1)
MONOCYTES # BLD AUTO: 0.03 K/UL — SIGNIFICANT CHANGE UP (ref 0–0.9)
MONOCYTES NFR BLD AUTO: 0.5 % — LOW (ref 2–14)
MONOCYTES NFR BLD: 0 % — LOW (ref 2–9)
MYELOCYTES NFR BLD: 0 % — SIGNIFICANT CHANGE UP (ref 0–0)
NEUTROPHIL AB SER-ACNC: 89.6 % — HIGH (ref 43–77)
NEUTROPHILS # BLD AUTO: 5.04 K/UL — SIGNIFICANT CHANGE UP (ref 1.8–7.4)
NEUTROPHILS NFR BLD AUTO: 85.1 % — HIGH (ref 43–77)
NEUTS BAND # BLD: 0.9 % — SIGNIFICANT CHANGE UP (ref 0–6)
NITRITE UR-MCNC: NEGATIVE — SIGNIFICANT CHANGE UP
NRBC # FLD: 0 K/UL — SIGNIFICANT CHANGE UP (ref 0–0)
OTHER - HEMATOLOGY %: 0 — SIGNIFICANT CHANGE UP
PH UR: 6.5 — SIGNIFICANT CHANGE UP (ref 5–8)
PHOSPHATE SERPL-MCNC: 2.2 MG/DL — LOW (ref 2.5–4.5)
PLATELET # BLD AUTO: 71 K/UL — LOW (ref 150–400)
PLATELET COUNT - ESTIMATE: SIGNIFICANT CHANGE UP
PMV BLD: 10.5 FL — SIGNIFICANT CHANGE UP (ref 7–13)
POLYCHROMASIA BLD QL SMEAR: SLIGHT — SIGNIFICANT CHANGE UP
POTASSIUM SERPL-MCNC: 3.6 MMOL/L — SIGNIFICANT CHANGE UP (ref 3.5–5.3)
POTASSIUM SERPL-SCNC: 3.6 MMOL/L — SIGNIFICANT CHANGE UP (ref 3.5–5.3)
PROMYELOCYTES # FLD: 0 % — SIGNIFICANT CHANGE UP (ref 0–0)
PROT SERPL-MCNC: 6.2 G/DL — SIGNIFICANT CHANGE UP (ref 6–8.3)
PROT UR-MCNC: 20 — SIGNIFICANT CHANGE UP
RBC # BLD: 2.42 M/UL — LOW (ref 4.2–5.8)
RBC # FLD: 13.8 % — SIGNIFICANT CHANGE UP (ref 10.3–14.5)
RBC CASTS # UR COMP ASSIST: SIGNIFICANT CHANGE UP (ref 0–?)
REVIEW TO FOLLOW: YES — SIGNIFICANT CHANGE UP
SODIUM SERPL-SCNC: 135 MMOL/L — SIGNIFICANT CHANGE UP (ref 135–145)
SP GR SPEC: 1.01 — SIGNIFICANT CHANGE UP (ref 1–1.04)
SPECIMEN SOURCE: SIGNIFICANT CHANGE UP
SQUAMOUS # UR AUTO: SIGNIFICANT CHANGE UP
UROBILINOGEN FLD QL: NORMAL — SIGNIFICANT CHANGE UP
VARIANT LYMPHS # BLD: 0 % — SIGNIFICANT CHANGE UP
WBC # BLD: 5.92 K/UL — SIGNIFICANT CHANGE UP (ref 3.8–10.5)
WBC # FLD AUTO: 5.92 K/UL — SIGNIFICANT CHANGE UP (ref 3.8–10.5)
WBC UR QL: SIGNIFICANT CHANGE UP (ref 0–?)

## 2019-08-02 PROCEDURE — 99233 SBSQ HOSP IP/OBS HIGH 50: CPT

## 2019-08-02 RX ORDER — CHLORHEXIDINE GLUCONATE 213 G/1000ML
1 SOLUTION TOPICAL DAILY
Refills: 0 | Status: DISCONTINUED | OUTPATIENT
Start: 2019-08-02 | End: 2019-08-19

## 2019-08-02 RX ORDER — TAMSULOSIN HYDROCHLORIDE 0.4 MG/1
0.4 CAPSULE ORAL AT BEDTIME
Refills: 0 | Status: DISCONTINUED | OUTPATIENT
Start: 2019-08-02 | End: 2019-08-19

## 2019-08-02 RX ORDER — PIPERACILLIN AND TAZOBACTAM 4; .5 G/20ML; G/20ML
3.38 INJECTION, POWDER, LYOPHILIZED, FOR SOLUTION INTRAVENOUS EVERY 8 HOURS
Refills: 0 | Status: COMPLETED | OUTPATIENT
Start: 2019-08-02 | End: 2019-08-12

## 2019-08-02 RX ORDER — MAGNESIUM SULFATE 500 MG/ML
2 VIAL (ML) INJECTION ONCE
Refills: 0 | Status: COMPLETED | OUTPATIENT
Start: 2019-08-02 | End: 2019-08-02

## 2019-08-02 RX ORDER — SODIUM,POTASSIUM PHOSPHATES 278-250MG
1 POWDER IN PACKET (EA) ORAL ONCE
Refills: 0 | Status: COMPLETED | OUTPATIENT
Start: 2019-08-02 | End: 2019-08-02

## 2019-08-02 RX ADMIN — Medication 3: at 17:14

## 2019-08-02 RX ADMIN — PIPERACILLIN AND TAZOBACTAM 25 GRAM(S): 4; .5 INJECTION, POWDER, LYOPHILIZED, FOR SOLUTION INTRAVENOUS at 02:29

## 2019-08-02 RX ADMIN — Medication 1: at 08:12

## 2019-08-02 RX ADMIN — TAMSULOSIN HYDROCHLORIDE 0.4 MILLIGRAM(S): 0.4 CAPSULE ORAL at 22:50

## 2019-08-02 RX ADMIN — CHLORHEXIDINE GLUCONATE 1 APPLICATION(S): 213 SOLUTION TOPICAL at 12:15

## 2019-08-02 RX ADMIN — FAMOTIDINE 40 MILLIGRAM(S): 10 INJECTION INTRAVENOUS at 22:50

## 2019-08-02 RX ADMIN — INSULIN GLARGINE 20 UNIT(S): 100 INJECTION, SOLUTION SUBCUTANEOUS at 22:50

## 2019-08-02 RX ADMIN — PIPERACILLIN AND TAZOBACTAM 25 GRAM(S): 4; .5 INJECTION, POWDER, LYOPHILIZED, FOR SOLUTION INTRAVENOUS at 22:50

## 2019-08-02 RX ADMIN — PIPERACILLIN AND TAZOBACTAM 25 GRAM(S): 4; .5 INJECTION, POWDER, LYOPHILIZED, FOR SOLUTION INTRAVENOUS at 08:13

## 2019-08-02 RX ADMIN — Medication 1 PACKET(S): at 08:13

## 2019-08-02 RX ADMIN — Medication 2: at 12:14

## 2019-08-02 RX ADMIN — Medication 50 GRAM(S): at 08:13

## 2019-08-02 RX ADMIN — PIPERACILLIN AND TAZOBACTAM 25 GRAM(S): 4; .5 INJECTION, POWDER, LYOPHILIZED, FOR SOLUTION INTRAVENOUS at 17:14

## 2019-08-02 RX ADMIN — SIMVASTATIN 5 MILLIGRAM(S): 20 TABLET, FILM COATED ORAL at 22:50

## 2019-08-02 NOTE — PROGRESS NOTE ADULT - ASSESSMENT
64 y/o man with PMHx of extensive small cell lung cancer, metastatic to bone, and brain followed by Dr. Thao who presents with fever, chills, urinary incontinence and a dry cough, last chemotherapy was on Monday admitted for management of suspected sepsis.

## 2019-08-02 NOTE — PROGRESS NOTE ADULT - SUBJECTIVE AND OBJECTIVE BOX
Patient is a 63y old  Male who presents with a chief complaint of Sepsis (02 Aug 2019 13:51)       INTERVAL HPI/OVERNIGHT EVENTS:  Patient seen and evaluated at bedside.  Pt is resting comfortable in NAD. Denies N/V/F/C.     Allergies    No Known Allergies    Intolerances        Vital Signs Last 24 Hrs  T(C): 36.8 (02 Aug 2019 13:30), Max: 36.8 (01 Aug 2019 17:54)  T(F): 98.3 (02 Aug 2019 13:30), Max: 98.3 (01 Aug 2019 17:54)  HR: 77 (02 Aug 2019 13:30) (68 - 80)  BP: 152/69 (02 Aug 2019 13:30) (106/61 - 166/72)  BP(mean): --  RR: 17 (02 Aug 2019 13:30) (17 - 17)  SpO2: 100% (02 Aug 2019 13:30) (97% - 100%)    LABS:                        7.6    5.92  )-----------( 71       ( 02 Aug 2019 05:40 )             22.7     08-02    135  |  97<L>  |  12  ----------------------------<  322<H>  3.6   |  25  |  1.23    Ca    8.2<L>      02 Aug 2019 05:40  Phos  2.2     08-02  Mg     1.5     08    TPro  6.2  /  Alb  2.7<L>  /  TBili  0.5  /  DBili  < 0.2  /  AST  31  /  ALT  63<H>  /  AlkPhos  135<H>  08-02      Urinalysis Basic - ( 02 Aug 2019 11:15 )    Color: LIGHT YELLOW / Appearance: CLEAR / S.010 / pH: 6.5  Gluc: NEGATIVE / Ketone: NEGATIVE  / Bili: NEGATIVE / Urobili: NORMAL   Blood: TRACE / Protein: 20 / Nitrite: NEGATIVE   Leuk Esterase: NEGATIVE / RBC: 3-5 / WBC 0-2   Sq Epi: OCC / Non Sq Epi: x / Bacteria: NEGATIVE      CAPILLARY BLOOD GLUCOSE      POCT Blood Glucose.: 211 mg/dL (02 Aug 2019 12:01)  POCT Blood Glucose.: 199 mg/dL (02 Aug 2019 07:34)  POCT Blood Glucose.: 273 mg/dL (01 Aug 2019 22:37)  POCT Blood Glucose.: 264 mg/dL (01 Aug 2019 21:18)  POCT Blood Glucose.: 210 mg/dL (01 Aug 2019 16:53)      Lower Extremity Physical Exam:  Vascular: DP/PT 2/4 B/L, CFT <3 seconds R, Temperature gradient warm to cool B/L  Musculoskeletal/Ortho: TMA of left foot  Skin:  Wound #1:   Location: midfoot left foot centrally  Size: 2cm x 4 cm  Depth: probes down to subQ, does not track, does not undermine  Wound bed: granular  Drainage: none  Odor: none  Periwound: fibrous    Wound #2:   Location: right foot plantar distal hallux wound  Size: 2mm x 2 mm  Depth: probes down to subQ, does not track, does not undermine  Wound bed: granular  Drainage: 1 cc of pus  Odor: mildly malodorous  Periwound: fibrous    RADIOLOGY & ADDITIONAL TESTS:    EXAM: RAD FOOT MIN 3 VIEWS BI       PROCEDURE DATE: Aug 1 2019         INTERPRETATION: CLINICAL INDICATION: right hallux and left TMA wounds     EXAM:   Frontal, oblique, and lateral views of both feet from 2019 at 1841. No   similar prior studies available for comparison.     IMPRESSION:   Age-indeterminate slightly distracted obliquely oriented right hallux distal   phalangeal tuft fracture. No dislocations or additional fractures on either   side.     Left foot TMA defect noted. No proximally tracking gas collections or   radiopaque foreign bodies. No definite radiographic evidence for   osteomyelitis in either foot.     Tarsometatarsal alignment maintained without evidence for a Lisfranc injury.     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 Preserved remaining joint spaces and   no joint margin erosions.     Bilateral posterosuperior calcaneal Lourdes deformities. Thick irregular   left plantar calcaneal enthesophytes. Small right calcaneal enthesophyte.     No discrete lytic or blastic lesions.                   MARY ALICE MARTINS M.D., ATTENDING RADIOLOGIST   This document has been electronically signed. Aug 2 2019 12:54PM

## 2019-08-02 NOTE — PROGRESS NOTE ADULT - SUBJECTIVE AND OBJECTIVE BOX
Patient is a 63y old  Male who presents with a chief complaint of fever (01 Aug 2019 16:55)      SUBJECTIVE / OVERNIGHT EVENTS: Pt has urinary retention, s/p straight cath 3X. Otherwise no complaints.    MEDICATIONS  (STANDING):  chlorhexidine 4% Liquid 1 Application(s) Topical daily  dextrose 5%. 1000 milliLiter(s) (50 mL/Hr) IV Continuous <Continuous>  dextrose 50% Injectable 12.5 Gram(s) IV Push once  dextrose 50% Injectable 25 Gram(s) IV Push once  dextrose 50% Injectable 25 Gram(s) IV Push once  famotidine    Tablet 40 milliGRAM(s) Oral at bedtime  insulin glargine Injectable (LANTUS) 20 Unit(s) SubCutaneous at bedtime  insulin lispro (HumaLOG) corrective regimen sliding scale   SubCutaneous three times a day before meals  lactated ringers. 1000 milliLiter(s) (50 mL/Hr) IV Continuous <Continuous>  piperacillin/tazobactam IVPB.. 3.375 Gram(s) IV Intermittent every 8 hours  simvastatin 5 milliGRAM(s) Oral at bedtime  tamsulosin 0.4 milliGRAM(s) Oral at bedtime    MEDICATIONS  (PRN):  dextrose 40% Gel 15 Gram(s) Oral once PRN Blood Glucose LESS THAN 70 milliGRAM(s)/deciliter  glucagon  Injectable 1 milliGRAM(s) IntraMuscular once PRN Glucose LESS THAN 70 milligrams/deciliter      Vital Signs Last 24 Hrs  T(C): 36.8 (02 Aug 2019 06:18), Max: 36.8 (01 Aug 2019 17:54)  T(F): 98.3 (02 Aug 2019 06:18), Max: 98.3 (01 Aug 2019 17:54)  HR: 72 (02 Aug 2019 06:18) (68 - 80)  BP: 166/72 (02 Aug 2019 06:18) (106/61 - 166/72)  BP(mean): --  RR: 17 (02 Aug 2019 06:18) (17 - 17)  SpO2: 99% (02 Aug 2019 06:18) (97% - 99%)  CAPILLARY BLOOD GLUCOSE      POCT Blood Glucose.: 211 mg/dL (02 Aug 2019 12:01)  POCT Blood Glucose.: 199 mg/dL (02 Aug 2019 07:34)  POCT Blood Glucose.: 273 mg/dL (01 Aug 2019 22:37)  POCT Blood Glucose.: 264 mg/dL (01 Aug 2019 21:18)  POCT Blood Glucose.: 210 mg/dL (01 Aug 2019 16:53)  POCT Blood Glucose.: 177 mg/dL (01 Aug 2019 12:21)    I&O's Summary    01 Aug 2019 07:01  -  02 Aug 2019 07:00  --------------------------------------------------------  IN: 0 mL / OUT: 1203 mL / NET: -1203 mL    02 Aug 2019 07:01  -  02 Aug 2019 12:09  --------------------------------------------------------  IN: 0 mL / OUT: 550 mL / NET: -550 mL        PHYSICAL EXAM:  GENERAL: NAD, well-developed  HEAD:  Atraumatic, Normocephalic  EYES: EOMI, PERRLA, conjunctiva and sclera clear  NECK: Supple, No JVD  CHEST/LUNG: Clear to auscultation bilaterally; No wheeze  HEART: Regular rate and rhythm; No murmurs, rubs, or gallops  ABDOMEN: Soft, Nontender, Nondistended; Bowel sounds present  EXTREMITIES: (+) ulcer of left foot  PSYCH: AAOx3  NEUROLOGY: non-focal  SKIN: No rashes or lesions    LABS:                        7.6    5.92  )-----------( 71       ( 02 Aug 2019 05:40 )             22.7     08-02    135  |  97<L>  |  12  ----------------------------<  322<H>  3.6   |  25  |  1.23    Ca    8.2<L>      02 Aug 2019 05:40  Phos  2.2     08-02  Mg     1.5     08-02    TPro  6.2  /  Alb  2.7<L>  /  TBili  0.5  /  DBili  < 0.2  /  AST  31  /  ALT  63<H>  /  AlkPhos  135<H>  08-          Urinalysis Basic - ( 02 Aug 2019 11:15 )    Color: LIGHT YELLOW / Appearance: CLEAR / S.010 / pH: 6.5  Gluc: NEGATIVE / Ketone: NEGATIVE  / Bili: NEGATIVE / Urobili: NORMAL   Blood: TRACE / Protein: 20 / Nitrite: NEGATIVE   Leuk Esterase: NEGATIVE / RBC: 3-5 / WBC 0-2   Sq Epi: OCC / Non Sq Epi: x / Bacteria: NEGATIVE        RADIOLOGY & ADDITIONAL TESTS:    Imaging Personally Reviewed:    Consultant(s) Notes Reviewed:  Podiatry    Care Discussed with Consultants/Other Providers:

## 2019-08-02 NOTE — PROGRESS NOTE ADULT - ASSESSMENT
64 yo M pt w/ b/l foot wounds   - pt seen and evaluated  - right foot plantar hallux wound probes to subQ w/ no further purulence expressed, wound does not track, demonstrates no undermining, and probes to subQ only, no malodor was noted  - Left foot- s/p TMA w/ central wound w/ a granular base that probes to subcutaneous tissue- wound does not track, no undermining, no malodor noted, and no drainage expressed  - X-ray results reviewed and discussed with pt demonstrated potential pathological fracture of right foot distal phalanx of hallux, left foot demonstrates no changes associated with osteomyelitis and is little concern as wound is stable.   - Right foot MRI ordered to r/o potential osteomyelitis of hallux  - pod will continue monitor  - discussed w/ attending 64 yo M pt w/ b/l foot wounds   - pt seen and evaluated  - right foot plantar hallux wound probes to subQ w/ no further purulence expressed, wound does not track, demonstrates no undermining, and probes to subQ only, no malodor was noted  - Left foot- s/p TMA w/ central wound w/ a granular base that probes to subcutaneous tissue- wound does not track, no undermining, no malodor noted, and no drainage expressed  - X-ray results reviewed and discussed with pt demonstrated acute fracture of right foot distal phalanx of hallux, left foot demonstrates no changes associated with osteomyelitis and is little concern as wound is stable.   - Low concern for osteomyelitis of b/l lower extremity wounds with no clinical signs of infection.  - Upon discussion with Radiologist, MR would demonstrate unequivocal changes of right foot with no distinguishable results between OM vs acute fracture.   - Pod believes elevated ESR is attributable with combination of acute fracture in conjunction with patient's small cell lung cancer  - pod will continue monitor  - discussed w/ attending 62 yo M pt w/ b/l foot wounds   - pt seen and evaluated  - right foot plantar hallux wound probes to subQ w/ no further purulence expressed, wound does not track, demonstrates no undermining, and probes to subQ only, no malodor was noted  - Left foot- s/p TMA w/ central wound w/ a granular base that probes to subcutaneous tissue- wound does not track, no undermining, no malodor noted, and no drainage expressed  - X-ray results reviewed and discussed with pt demonstrated acute fracture of right foot distal phalanx of hallux, left foot demonstrates no changes associated with osteomyelitis and is little concern as wound is stable.   - Low concern for osteomyelitis of b/l lower extremity wounds with no clinical signs of infection.  - Upon discussion with Radiologist, MR would demonstrate unequivocal changes of right foot with no distinguishable results between OM vs acute fracture.   - Pod believes elevated ESR is attributable with combination of acute fracture in conjunction with patient's small cell lung cancer  - Pod recc ID consult and potential bone biopsy pending ID reccs  - pod will continue monitor  - discussed w/ attending

## 2019-08-02 NOTE — PROGRESS NOTE ADULT - PROBLEM SELECTOR PLAN 10
DVT PPx: ICD  Diet: DASH/TLC, Diabetic  Dispo: PT consulted  Code status: Full code  Daughter Lin is [phone]

## 2019-08-02 NOTE — CONSULT NOTE ADULT - PROBLEM SELECTOR RECOMMENDATION 9
Continue indwelling dutton for 48-72 h before TOV  Continue flomax  Pt can follow up with Dr. Daugherty as an outpatient for further work up of his urinary retention  [phone] Continue indwelling dutton for 48-72 h before TOV  Continue flomax  Repeat Urine Cx - last specimen contaminated  Pt can follow up with Dr. Daugherty as an outpatient for further work up of his urinary retention  [phone]

## 2019-08-02 NOTE — PROGRESS NOTE ADULT - PROBLEM SELECTOR PLAN 5
F/U A1c  - restart lantus at 20 units, home is 50 units, tailor according to blood sugars  - continue ISS, FS QID  - diabetic diet

## 2019-08-02 NOTE — CONSULT NOTE ADULT - SUBJECTIVE AND OBJECTIVE BOX
HPI:  62 y/o man with PMHx of extensive small cell lung cancer, metastatic to bone, and brain followed by Dr. Thao who presents fever, chills, urinary incontinence and a dry cough. History is obtained by his daughter Lin who is his health-care proxy and insisted on translation. Daughter reports the patient had a fever of 102.7 and has been very weak with chills. Patient denies diarrhea, nausea or vomiting. No sick contacts or recent travel, no CP or dyspnea. Last chemotherapy was on Monday, no new medications/recent antibiotic use.   Onc Hx:  Patient recently emigrated to the  from [facility] 2018 diagnosed 2018 with small cell carcinoma s/p Etoposide/Carboplatin 3/8/19, 2019-brain MRI showed a new tiny enhancing lesion in the left frontal subcortical region compatible with metastasis. Patient underwent gamma knife radiosurgery, completing 2019. 2019-Began Nivolumab, 2019-CT scan chest/abdomen/pelvis-new left upper lobe satellite nodules with increased left mediastinal and left hilar adenopathy, consistent with disease progression. Interval pathologic fracture of right posterior sixth rib, otherwise stable osseous metastases.   In the ED patient was found to be normotensive, satting 100% on RA, afebrile. Labs notable fro a leukocytosis of 11.92, platelets of 71, previously 107. CXR neg, UA neg. Given IV vanco, zosyn, admitted for further management.   On HOD#3 pt was noted to be leaking urine and based on straight cath protocol had residuals of about 600 cc x 3.  Dutton placed this AM after multiple attempts to void and   again 600 cc residual noted on placement of indwelling dutton.           PAST MEDICAL & SURGICAL HISTORY:  Lung cancer  Hyperlipidemia  Diabetes mellitus  Hypertension  History of foot surgery      Review of Systems:   CONSTITUTIONAL: No fever, weight loss, or fatigue  EYES: No eye pain, visual disturbances, or discharge  ENMT:  No difficulty hearing, tinnitus, vertigo; No sinus or throat pain  NECK: No pain or stiffness  BREASTS: No pain, masses, or nipple discharge  RESPIRATORY: No cough, wheezing, chills or hemoptysis; No shortness of breath  CARDIOVASCULAR: No chest pain, palpitations, dizziness, or leg swelling  GASTROINTESTINAL: No abdominal or epigastric pain. No nausea, vomiting, or hematemesis; No diarrhea or constipation. No melena or hematochezia.  GENITOURINARY: No dysuria, frequency, hematuria, or incontinence  NEUROLOGICAL: No headaches, memory loss, loss of strength, numbness, or tremors  SKIN: No itching, burning, rashes, or lesions   LYMPH NODES: No enlarged glands  ENDOCRINE: No heat or cold intolerance; No hair loss  MUSCULOSKELETAL: No joint pain or swelling; No muscle, back, or extremity pain  PSYCHIATRIC: No depression, anxiety, mood swings, or difficulty sleeping  HEME/LYMPH: No easy bruising, or bleeding gums  ALLERGY AND IMMUNOLOGIC: No hives or eczema    Allergies    No Known Allergies    Intolerances    Social History:   Former smoker    · 1PPD x 14 years-stopped in his 40's  Four children   · 3 living children-daughter helps in patient care; 1 son in [place] and 1 son in [place]    No alcohol use  No illicit drug use  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    FAMILY HISTORY:  No pertinent family history in first degree relatives      MEDICATIONS  (STANDING):          T(C): 36.7 (19 @ 15:54), Max: 36.8 (19 @ 12:57)  HR: 81 (19 @ 15:54) (81 - 109)  BP: 122/63 (19 @ 15:54) (99/61 - 122/63)  RR: 18 (19 @ 15:54) (18 - 24)  SpO2: 100% (19 @ 15:54) (99% - 100%)    CAPILLARY BLOOD GLUCOSE      POCT Blood Glucose.: 234 mg/dL (2019 12:38)    I&O's Summary      PHYSICAL EXAM:  GENERAL: NAD, appears ill, cooperative with exam  HEAD:  Atraumatic, Normocephalic  EYES: EOMI, PERRLA, conjunctiva and sclera clear  NECK: Supple, No elevated JVD  CHEST/LUNG: Diminished at bases; No wheeze  HEART: Regular rate and rhythm; No murmurs, rubs, or gallops  ABDOMEN: Soft, Nontender, Nondistended; Bowel sounds present  EXTREMITIES:  2+ Peripheral Pulses, No clubbing, cyanosis, or edema  PSYCH: AAOx3  NEUROLOGY: CN II-XII grossly intact, moving all extremities  SKIN: No rashes or lesions    LABS:                        9.1    11.92 )-----------( 71       ( 2019 14:02 )             27.1         133<L>  |  96<L>  |  24<H>  ----------------------------<  207<H>  3.9   |  26  |  1.56<H>    Ca    8.9      2019 13:52    TPro  6.9  /  Alb  3.4  /  TBili  0.8  /  DBili  x   /  AST  73<H>  /  ALT  111<H>  /  AlkPhos  172<H>            Urinalysis Basic - ( 2019 14:57 )    Color: LIGHT YELLOW / Appearance: CLEAR / S.012 / pH: 6.0  Gluc: NEGATIVE / Ketone: NEGATIVE  / Bili: NEGATIVE / Urobili: NORMAL   Blood: NEGATIVE / Protein: 30 / Nitrite: NEGATIVE   Leuk Esterase: NEGATIVE / RBC: 3-5 / WBC 0-2   Sq Epi: OCC / Non Sq Epi: x / Bacteria: NEGATIVE          RADIOLOGY & ADDITIONAL TESTS:    ECG Personally Reviewed -     Imaging Personally Reviewed:    Consultant(s) Notes Reviewed:      Care Discussed with Consultants/Other Providers: (2019 17:23)      PAST MEDICAL & SURGICAL HISTORY:  Lung cancer  Hyperlipidemia  Diabetes mellitus  Hypertension  History of foot surgery      MEDICATIONS  (STANDING):  chlorhexidine 4% Liquid 1 Application(s) Topical daily  dextrose 5%. 1000 milliLiter(s) (50 mL/Hr) IV Continuous <Continuous>  dextrose 50% Injectable 12.5 Gram(s) IV Push once  dextrose 50% Injectable 25 Gram(s) IV Push once  dextrose 50% Injectable 25 Gram(s) IV Push once  famotidine    Tablet 40 milliGRAM(s) Oral at bedtime  insulin glargine Injectable (LANTUS) 20 Unit(s) SubCutaneous at bedtime  insulin lispro (HumaLOG) corrective regimen sliding scale   SubCutaneous three times a day before meals  lactated ringers. 1000 milliLiter(s) (50 mL/Hr) IV Continuous <Continuous>  piperacillin/tazobactam IVPB.. 3.375 Gram(s) IV Intermittent every 8 hours  simvastatin 5 milliGRAM(s) Oral at bedtime  tamsulosin 0.4 milliGRAM(s) Oral at bedtime    MEDICATIONS  (PRN):  dextrose 40% Gel 15 Gram(s) Oral once PRN Blood Glucose LESS THAN 70 milliGRAM(s)/deciliter  glucagon  Injectable 1 milliGRAM(s) IntraMuscular once PRN Glucose LESS THAN 70 milligrams/deciliter      FAMILY HISTORY:  No pertinent family history in first degree relatives      Allergies    No Known Allergies    Intolerances        SOCIAL HISTORY:    REVIEW OF SYSTEMS: Otherwise negative as stated in HPI      Vital Signs Last 24 Hrs  T(C): 36.8 (02 Aug 2019 13:30), Max: 36.8 (01 Aug 2019 17:54)  T(F): 98.3 (02 Aug 2019 13:30), Max: 98.3 (01 Aug 2019 17:54)  HR: 77 (02 Aug 2019 13:30) (68 - 80)  BP: 152/69 (02 Aug 2019 13:30) (106/61 - 166/72)  BP(mean): --  RR: 17 (02 Aug 2019 13:30) (17 - 17)  SpO2: 100% (02 Aug 2019 13:30) (97% - 100%)    PHYSICAL EXAM:    General:	[ ] Awake and Alert in no acute distress    Respiratory and Thorax: [  ] no resp distress   	  Cardiovascular: [ ] S1,S2 Reg Rate    Gastrointestinal: [ ]soft  [ ] non tender [ ] +BS  [ ] scars,   CVAT [ ]Y  [ ]N  /    [ ]L  [ ]R     Genitourinary: Glans Circumcised [ ]Y  [ ]N, [ ]lesions     Meatus Discharge [ ]Y  [ ] N,  Blood [ ]Y [ ] N                               Testes  Descended [ ]Y  [ ]N,    Tender [ ]Y  [ ]N,   Epididymis Tender  [ ]Y [ ]N,    Cremasteric [ ]Y  [ ]N                        	    Musculoskeletal / Extremities:  Edema [ ]Y [ ]N,  AROM x 4 [ ]Y  [ ]N  	          LABS:                        7.6    5.92  )-----------( 71       ( 02 Aug 2019 05:40 )             22.7     08-02    135  |  97<L>  |  12  ----------------------------<  322<H>  3.6   |  25  |  1.23    Ca    8.2<L>      02 Aug 2019 05:40  Phos  2.2     08-02  Mg     1.5     08-02    TPro  6.2  /  Alb  2.7<L>  /  TBili  0.5  /  DBili  < 0.2  /  AST  31  /  ALT  63<H>  /  AlkPhos  135<H>  08-      Urinalysis Basic - ( 02 Aug 2019 11:15 )    Color: LIGHT YELLOW / Appearance: CLEAR / S.010 / pH: 6.5  Gluc: NEGATIVE / Ketone: NEGATIVE  / Bili: NEGATIVE / Urobili: NORMAL   Blood: TRACE / Protein: 20 / Nitrite: NEGATIVE   Leuk Esterase: NEGATIVE / RBC: 3-5 / WBC 0-2   Sq Epi: OCC / Non Sq Epi: x / Bacteria: NEGATIVE      URINE CX:    RADIOLOGY:    ASSESSMENT & PLAN: HPI:  62 y/o man with PMHx of extensive small cell lung cancer, metastatic to bone, and brain followed by Dr. Thao who presents fever, chills, urinary incontinence and a dry cough. History is obtained by his daughter Lin who is his health-care proxy and insisted on translation. Daughter reports the patient had a fever of 102.7 and has been very weak with chills. Patient denies diarrhea, nausea or vomiting. No sick contacts or recent travel, no CP or dyspnea. Last chemotherapy was on Monday, no new medications/recent antibiotic use.   Onc Hx:  Patient recently emigrated to the  from [facility] 2018 diagnosed 2018 with small cell carcinoma s/p Etoposide/Carboplatin 3/8/19, 2019-brain MRI showed a new tiny enhancing lesion in the left frontal subcortical region compatible with metastasis. Patient underwent gamma knife radiosurgery, completing 2019. 2019-Began Nivolumab, 2019-CT scan chest/abdomen/pelvis-new left upper lobe satellite nodules with increased left mediastinal and left hilar adenopathy, consistent with disease progression. Interval pathologic fracture of right posterior sixth rib, otherwise stable osseous metastases.   In the ED patient was found to be normotensive, satting 100% on RA, afebrile. Labs notable fro a leukocytosis of 11.92, platelets of 71, previously 107. CXR neg, UA neg. Given IV vanco, zosyn, admitted for further management.   On HOD#3 pt was noted to be leaking urine and based on straight cath protocol had residuals of about 600 cc x 3.  Dutton placed this AM after multiple attempts to void and   again 600 cc residual noted on placement of indwelling dutton.   Of note pt's daughter relates that he has been having leakage of urine for last 1 month.  Denies any previous Urologic issues or work up.        PAST MEDICAL & SURGICAL HISTORY:  Lung cancer  Hyperlipidemia  Diabetes mellitus  Hypertension  History of foot surgery      Review of Systems:   CONSTITUTIONAL: No fever, weight loss, or fatigue  EYES: No eye pain, visual disturbances, or discharge  ENMT:  No difficulty hearing, tinnitus, vertigo; No sinus or throat pain  NECK: No pain or stiffness  BREASTS: No pain, masses, or nipple discharge  RESPIRATORY: No cough, wheezing, chills or hemoptysis; No shortness of breath  CARDIOVASCULAR: No chest pain, palpitations, dizziness, or leg swelling  GASTROINTESTINAL: No abdominal or epigastric pain. No nausea, vomiting, or hematemesis; No diarrhea or constipation. No melena or hematochezia.  GENITOURINARY: No dysuria, frequency, hematuria, or incontinence  NEUROLOGICAL: No headaches, memory loss, loss of strength, numbness, or tremors  SKIN: No itching, burning, rashes, or lesions   LYMPH NODES: No enlarged glands  ENDOCRINE: No heat or cold intolerance; No hair loss  MUSCULOSKELETAL: No joint pain or swelling; No muscle, back, or extremity pain  PSYCHIATRIC: No depression, anxiety, mood swings, or difficulty sleeping  HEME/LYMPH: No easy bruising, or bleeding gums  ALLERGY AND IMMUNOLOGIC: No hives or eczema    Allergies    No Known Allergies    Intolerances    Social History:   Former smoker    · 1PPD x 14 years-stopped in his 40's  Four children   · 3 living children-daughter helps in patient care; 1 son in [facility] and 1 son in [place]    No alcohol use  No illicit drug use  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 1000 milliLiter(s) (50 mL/Hr) IV Continuous <Continuous>  dextrose 50% Injectable 12.5 Gram(s) IV Push once  dextrose 50% Injectable 25 Gram(s) IV Push once  dextrose 50% Injectable 25 Gram(s) IV Push once  famotidine    Tablet 40 milliGRAM(s) Oral at bedtime  insulin glargine Injectable (LANTUS) 20 Unit(s) SubCutaneous at bedtime  insulin lispro (HumaLOG) corrective regimen sliding scale   SubCutaneous three times a day before meals  lactated ringers. 1000 milliLiter(s) (50 mL/Hr) IV Continuous <Continuous>  piperacillin/tazobactam IVPB.. 3.375 Gram(s) IV Intermittent every 8 hours  simvastatin 5 milliGRAM(s) Oral at bedtime  tamsulosin 0.4 milliGRAM(s) Oral at bedtime    MEDICATIONS  (PRN):  dextrose 40% Gel 15 Gram(s) Oral once PRN Blood Glucose LESS THAN 70 milliGRAM(s)/deciliter  glucagon  Injectable 1 milliGRAM(s) IntraMuscular once PRN Glucose LESS THAN 70 milligrams/deciliter      T(C): 36.7 (19 @ 15:54), Max: 36.8 (19 @ 12:57)  HR: 81 (19 @ 15:54) (81 - 109)  BP: 122/63 (19 @ 15:54) (99/61 - 122/63)  RR: 18 (19 @ 15:54) (18 - 24)  SpO2: 100% (19 @ 15:54) (99% - 100%)              PHYSICAL EXAM:  GENERAL: NAD, appears ill, cooperative with exam  HEAD:  Atraumatic, Normocephalic  EYES: EOMI, PERRLA, conjunctiva and sclera clear  NECK: Supple, No elevated JVD  CHEST/LUNG: Diminished at bases; No wheeze  HEART: Regular rate and rhythm; No murmurs, rubs, or gallops  ABDOMEN: Soft, Nontender, Nondistended; Bowel sounds present  : uncircumcised, dutton in place draining well - yellow  * refusing prostate exam  EXTREMITIES:  2+ Peripheral Pulses, No clubbing, cyanosis, or edema  PSYCH: AAOx3  NEUROLOGY: CN II-XII grossly intact, moving all extremities  SKIN: No rashes or lesions    LABS:                        9.1    11.92 )-----------( 71       ( 2019 14:02 )             27.1         133<L>  |  96<L>  |  24<H>  ----------------------------<  207<H>  3.9   |  26  |  1.56<H>    Ca    8.9      2019 13:52    TPro  6.9  /  Alb  3.4  /  TBili  0.8  /  DBili  x   /  AST  73<H>  /  ALT  111<H>  /  AlkPhos  172<H>            Urinalysis Basic - ( 2019 14:57 )    Color: LIGHT YELLOW / Appearance: CLEAR / S.012 / pH: 6.0  Gluc: NEGATIVE / Ketone: NEGATIVE  / Bili: NEGATIVE / Urobili: NORMAL   Blood: NEGATIVE / Protein: 30 / Nitrite: NEGATIVE   Leuk Esterase: NEGATIVE / RBC: 3-5 / WBC 0-2   Sq Epi: OCC / Non Sq Epi: x / Bacteria: NEGATIVE      URINE CX: pend

## 2019-08-03 LAB
ALBUMIN SERPL ELPH-MCNC: 3.1 G/DL — LOW (ref 3.3–5)
ALP SERPL-CCNC: 174 U/L — HIGH (ref 40–120)
ALT FLD-CCNC: 54 U/L — HIGH (ref 4–41)
ANION GAP SERPL CALC-SCNC: 10 MMO/L — SIGNIFICANT CHANGE UP (ref 7–14)
AST SERPL-CCNC: 21 U/L — SIGNIFICANT CHANGE UP (ref 4–40)
BACTERIA UR CULT: SIGNIFICANT CHANGE UP
BASOPHILS # BLD AUTO: 0.01 K/UL — SIGNIFICANT CHANGE UP (ref 0–0.2)
BASOPHILS NFR BLD AUTO: 0.3 % — SIGNIFICANT CHANGE UP (ref 0–2)
BILIRUB SERPL-MCNC: 0.6 MG/DL — SIGNIFICANT CHANGE UP (ref 0.2–1.2)
BUN SERPL-MCNC: 9 MG/DL — SIGNIFICANT CHANGE UP (ref 7–23)
CALCIUM SERPL-MCNC: 8.6 MG/DL — SIGNIFICANT CHANGE UP (ref 8.4–10.5)
CHLORIDE SERPL-SCNC: 99 MMOL/L — SIGNIFICANT CHANGE UP (ref 98–107)
CO2 SERPL-SCNC: 30 MMOL/L — SIGNIFICANT CHANGE UP (ref 22–31)
CREAT SERPL-MCNC: 1.13 MG/DL — SIGNIFICANT CHANGE UP (ref 0.5–1.3)
EOSINOPHIL # BLD AUTO: 0.04 K/UL — SIGNIFICANT CHANGE UP (ref 0–0.5)
EOSINOPHIL NFR BLD AUTO: 1.4 % — SIGNIFICANT CHANGE UP (ref 0–6)
GLUCOSE BLDC GLUCOMTR-MCNC: 186 MG/DL — HIGH (ref 70–99)
GLUCOSE BLDC GLUCOMTR-MCNC: 278 MG/DL — HIGH (ref 70–99)
GLUCOSE BLDC GLUCOMTR-MCNC: 300 MG/DL — HIGH (ref 70–99)
GLUCOSE BLDC GLUCOMTR-MCNC: 301 MG/DL — HIGH (ref 70–99)
GLUCOSE SERPL-MCNC: 196 MG/DL — HIGH (ref 70–99)
HCT VFR BLD CALC: 24.5 % — LOW (ref 39–50)
HGB BLD-MCNC: 8 G/DL — LOW (ref 13–17)
IMM GRANULOCYTES NFR BLD AUTO: 0.3 % — SIGNIFICANT CHANGE UP (ref 0–1.5)
LYMPHOCYTES # BLD AUTO: 0.91 K/UL — LOW (ref 1–3.3)
LYMPHOCYTES # BLD AUTO: 30.8 % — SIGNIFICANT CHANGE UP (ref 13–44)
MAGNESIUM SERPL-MCNC: 1.7 MG/DL — SIGNIFICANT CHANGE UP (ref 1.6–2.6)
MANUAL SMEAR VERIFICATION: SIGNIFICANT CHANGE UP
MCHC RBC-ENTMCNC: 29.9 PG — SIGNIFICANT CHANGE UP (ref 27–34)
MCHC RBC-ENTMCNC: 32.7 % — SIGNIFICANT CHANGE UP (ref 32–36)
MCV RBC AUTO: 91.4 FL — SIGNIFICANT CHANGE UP (ref 80–100)
MONOCYTES # BLD AUTO: 0.07 K/UL — SIGNIFICANT CHANGE UP (ref 0–0.9)
MONOCYTES NFR BLD AUTO: 2.4 % — SIGNIFICANT CHANGE UP (ref 2–14)
NEUTROPHILS # BLD AUTO: 1.91 K/UL — SIGNIFICANT CHANGE UP (ref 1.8–7.4)
NEUTROPHILS NFR BLD AUTO: 64.8 % — SIGNIFICANT CHANGE UP (ref 43–77)
NRBC # FLD: 0 K/UL — SIGNIFICANT CHANGE UP (ref 0–0)
PHOSPHATE SERPL-MCNC: 2.8 MG/DL — SIGNIFICANT CHANGE UP (ref 2.5–4.5)
PLATELET # BLD AUTO: 61 K/UL — LOW (ref 150–400)
PMV BLD: 10.3 FL — SIGNIFICANT CHANGE UP (ref 7–13)
POTASSIUM SERPL-MCNC: 3.5 MMOL/L — SIGNIFICANT CHANGE UP (ref 3.5–5.3)
POTASSIUM SERPL-SCNC: 3.5 MMOL/L — SIGNIFICANT CHANGE UP (ref 3.5–5.3)
PROT SERPL-MCNC: 6.7 G/DL — SIGNIFICANT CHANGE UP (ref 6–8.3)
RBC # BLD: 2.68 M/UL — LOW (ref 4.2–5.8)
RBC # FLD: 13.5 % — SIGNIFICANT CHANGE UP (ref 10.3–14.5)
SODIUM SERPL-SCNC: 139 MMOL/L — SIGNIFICANT CHANGE UP (ref 135–145)
SPECIMEN SOURCE: SIGNIFICANT CHANGE UP
WBC # BLD: 2.95 K/UL — LOW (ref 3.8–10.5)
WBC # FLD AUTO: 2.95 K/UL — LOW (ref 3.8–10.5)

## 2019-08-03 PROCEDURE — 99223 1ST HOSP IP/OBS HIGH 75: CPT | Mod: GC

## 2019-08-03 PROCEDURE — 99233 SBSQ HOSP IP/OBS HIGH 50: CPT

## 2019-08-03 RX ADMIN — Medication 3: at 12:30

## 2019-08-03 RX ADMIN — PIPERACILLIN AND TAZOBACTAM 25 GRAM(S): 4; .5 INJECTION, POWDER, LYOPHILIZED, FOR SOLUTION INTRAVENOUS at 06:19

## 2019-08-03 RX ADMIN — TAMSULOSIN HYDROCHLORIDE 0.4 MILLIGRAM(S): 0.4 CAPSULE ORAL at 21:40

## 2019-08-03 RX ADMIN — SIMVASTATIN 5 MILLIGRAM(S): 20 TABLET, FILM COATED ORAL at 21:40

## 2019-08-03 RX ADMIN — Medication 3: at 17:14

## 2019-08-03 RX ADMIN — INSULIN GLARGINE 20 UNIT(S): 100 INJECTION, SOLUTION SUBCUTANEOUS at 21:40

## 2019-08-03 RX ADMIN — Medication 1: at 08:35

## 2019-08-03 RX ADMIN — FAMOTIDINE 40 MILLIGRAM(S): 10 INJECTION INTRAVENOUS at 21:40

## 2019-08-03 RX ADMIN — CHLORHEXIDINE GLUCONATE 1 APPLICATION(S): 213 SOLUTION TOPICAL at 12:30

## 2019-08-03 RX ADMIN — PIPERACILLIN AND TAZOBACTAM 25 GRAM(S): 4; .5 INJECTION, POWDER, LYOPHILIZED, FOR SOLUTION INTRAVENOUS at 21:39

## 2019-08-03 RX ADMIN — PIPERACILLIN AND TAZOBACTAM 25 GRAM(S): 4; .5 INJECTION, POWDER, LYOPHILIZED, FOR SOLUTION INTRAVENOUS at 13:23

## 2019-08-03 NOTE — CONSULT NOTE ADULT - ATTENDING COMMENTS
64 yo M with PMH extensive small cell lung cancer, metastatic to bone, and brain on Gemcitabine (last dose Mon 7/29/19), HTN, HLD, DM, CKD. P/w fever, chills, urinary incontinence and cough with white sputum.  Fever, leukocytosis  R foot toe wound; L foot TMA wound  Per note, patient has purulence expressed from Hallux wound prior  Both wounds appear chronic/longstanding, with minimal erythema or discharge  Would give course treatment for SSTI  BCX NGTD, UCX neg  Uncertain hallux wound at sufficient depth for OM  Overall, fever, wound infection, elevated ESR  - Zosyn 3.375g q 8  - Consider vanco if signs worsening  - F/U podiatry  - Consider MRI to LE; consider bone biopsy if MR not feasible  - F/U pending cultures  - Case discussed with primary team    Elmer Child MD  Pager [phone]  After 5pm and on weekends call [phone]    I was physically present for the key portions of the evaluation and management service provided. I saw and examined the patient. I agree with the above history, physical, and plan except for any discrepancies which I have documented in “Attending Attestation.” Please refer to “Attending Attestation” for final plan.

## 2019-08-03 NOTE — PROGRESS NOTE ADULT - ASSESSMENT
62 yo M pt w/ b/l foot wounds   - pt seen and evaluated  - right foot plantar hallux wound probes to subQ w/ no further purulence expressed, wound does not track, demonstrates no undermining, and probes to subQ only, no malodor was noted  - Left foot- s/p TMA w/ central wound w/ a granular base that probes to subcutaneous tissue- wound does not track, no undermining, no malodor noted, and no drainage expressed  - X-ray results reviewed and discussed with pt demonstrated acute fracture of right foot distal phalanx of hallux, left foot demonstrates no changes associated with osteomyelitis and is little concern as wound is stable.   - Low concern for osteomyelitis of b/l lower extremity wounds with no clinical signs of infection.  - Pod believes elevated ESR is attributable with combination of acute fracture in conjunction with patient's small cell lung cancer  - IF further clinical evidence arises leading to a higher suspicion of OM, POD wants MRI ordered before performing bone bx   - pod will continue monitor  - seen w/ attending 62 yo M pt w/ b/l foot wounds   - pt seen and evaluated  - right foot plantar hallux wound probes to subQ w/ no further purulence expressed, wound does not track, demonstrates no undermining, and probes to subQ only, no malodor was noted- performed sharp excisional debridement to the level of subcutaneous tissue, but not beyond subcutaneous tissue, the deepest level excised was subcutaneous tissue, using a sterile 15 blade  - Left foot- s/p TMA w/ central wound w/ a granular base that probes to subcutaneous tissue- wound does not track, no undermining, no malodor noted, and no drainage expressed  - X-ray results reviewed and discussed with pt demonstrated acute fracture of right foot distal phalanx of hallux, left foot demonstrates no changes associated with osteomyelitis and is little concern as wound is stable.   - Low concern for osteomyelitis of b/l lower extremity wounds with no clinical signs of infection.  - Pod believes elevated ESR is attributable with combination of acute fracture in conjunction with patient's small cell lung cancer  - IF further clinical evidence arises leading to a higher suspicion of OM, POD wants MRI ordered before performing bone bx   - pod will continue monitor  - seen w/ attending 62 yo M pt w/ b/l foot wounds   - pt seen and evaluated  - right foot plantar hallux wound probes to subQ w/ no purulence expressed, wound does not track, demonstrates no undermining, and probes to subQ only, no malodor was noted- performed sharp excisional debridement to the level of subcutaneous tissue, but not beyond subcutaneous tissue, the deepest level excised was subcutaneous tissue, using a sterile 15 blade  - Left foot- s/p TMA w/ central wound w/ a granular base that probes to subcutaneous tissue- wound does not track, no undermining, no malodor noted, and no drainage expressed  - X-ray results reviewed and discussed with pt demonstrated acute fracture of right foot distal phalanx of hallux, left foot demonstrates no changes associated with osteomyelitis and is little concern as wound is stable.   - Low concern for osteomyelitis of b/l lower extremity wounds with no clinical signs of infection.  - Pod believes elevated ESR is attributable with combination of acute fracture in conjunction with patient's small cell lung cancer  - IF further clinical evidence arises leading to a higher suspicion of OM, POD wants MRI ordered before performing bone bx   - pod will continue monitor  - seen w/ attending

## 2019-08-03 NOTE — PROGRESS NOTE ADULT - SUBJECTIVE AND OBJECTIVE BOX
Podiatry pager #: [phone] ([place])/ [phone] ([facility])    Patient is a 63y old  Male who presents with a chief complaint of RF hallux wound, Left foot TMA with open wound (02 Aug 2019 15:07)       INTERVAL HPI/OVERNIGHT EVENTS:  Patient seen and evaluated at bedside.  Pt is resting comfortable in NAD. Denies N/V/F/C.     Allergies    No Known Allergies    Intolerances        Vital Signs Last 24 Hrs  T(C): 37.1 (03 Aug 2019 12:23), Max: 37.1 (02 Aug 2019 21:01)  T(F): 98.8 (03 Aug 2019 12:23), Max: 98.8 (03 Aug 2019 12:23)  HR: 75 (03 Aug 2019 12:23) (70 - 76)  BP: 159/64 (03 Aug 2019 12:23) (148/63 - 168/71)  BP(mean): --  RR: 18 (03 Aug 2019 12:23) (17 - 18)  SpO2: 96% (03 Aug 2019 12:23) (96% - 100%)    LABS:                        8.0    2.95  )-----------( 61       ( 03 Aug 2019 05:40 )             24.5     08-03    139  |  99  |  9   ----------------------------<  196<H>  3.5   |  30  |  1.13    Ca    8.6      03 Aug 2019 05:40  Phos  2.8     08-03  Mg     1.7     -    TPro  6.7  /  Alb  3.1<L>  /  TBili  0.6  /  DBili  x   /  AST  21  /  ALT  54<H>  /  AlkPhos  174<H>  08-03      Urinalysis Basic - ( 02 Aug 2019 11:15 )    Color: LIGHT YELLOW / Appearance: CLEAR / S.010 / pH: 6.5  Gluc: NEGATIVE / Ketone: NEGATIVE  / Bili: NEGATIVE / Urobili: NORMAL   Blood: TRACE / Protein: 20 / Nitrite: NEGATIVE   Leuk Esterase: NEGATIVE / RBC: 3-5 / WBC 0-2   Sq Epi: OCC / Non Sq Epi: x / Bacteria: NEGATIVE      CAPILLARY BLOOD GLUCOSE      POCT Blood Glucose.: 278 mg/dL (03 Aug 2019 12:03)  POCT Blood Glucose.: 186 mg/dL (03 Aug 2019 07:53)  POCT Blood Glucose.: 308 mg/dL (02 Aug 2019 21:00)  POCT Blood Glucose.: 278 mg/dL (02 Aug 2019 17:02)      Lower Extremity Physical Exam:  Vascular: DP/PT 2/4 B/L, CFT <3 seconds R, Temperature gradient warm to cool B/L  Musculoskeletal/Ortho: TMA of left foot  Skin:  Wound #1:   Location: midfoot left foot centrally  Size: 2cm x 4 cm  Depth: probes down to subQ, does not track, does not undermine  Wound bed: granular  Drainage: none  Odor: none  Periwound: fibrous    Wound #2:   Location: right foot plantar distal hallux wound  Size: 2mm x 2 mm  Depth: probes down to subQ, does not track, does not undermine  Wound bed: granular  Drainage: 1 cc of pus  Odor: mildly malodorous  Periwound: fibrous    RADIOLOGY & ADDITIONAL TESTS:  EXAM: RAD FOOT MIN 3 VIEWS BI       PROCEDURE DATE: Aug 1 2019         INTERPRETATION: CLINICAL INDICATION: right hallux and left TMA wounds     EXAM:   Frontal, oblique, and lateral views of both feet from 2019 at 1841. No   similar prior studies available for comparison.     IMPRESSION:   Age-indeterminate slightly distracted obliquely oriented right hallux distal   phalangeal tuft fracture. No dislocations or additional fractures on either   side.     Left foot TMA defect noted. No proximally tracking gas collections or   radiopaque foreign bodies. No definite radiographic evidence for   osteomyelitis in either foot.     Tarsometatarsal alignment maintained without evidence for a Lisfranc injury.     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 Preserved remaining joint spaces and   no joint margin erosions.     Bilateral posterosuperior calcaneal Lourdes deformities. Thick irregular   left plantar calcaneal enthesophytes. Small right calcaneal enthesophyte.     No discrete lytic or blastic lesions.                   MARY ALICE MARTINS M.D., ATTENDING RADIOLOGIST   This document has been electronically signed. Aug 2 2019 12:54PM

## 2019-08-03 NOTE — PROGRESS NOTE ADULT - PROBLEM SELECTOR PLAN 10
DVT PPx: ICD  Diet: DASH/TLC, Diabetic  Dispo: PT consulted  Code status: Full code  Daughter Lin is [phone]

## 2019-08-03 NOTE — PROGRESS NOTE ADULT - PROBLEM SELECTOR PLAN 1
Leukocytosis, fever at home of 102.7, HR 95  Unclear source, negative UA, negative CXR, only complaining of urinary incontinence, dry cough, ddx also includes tumor fever from increased burden as shown by POD. (+) b/l foot ulcers, r/o OM  - continue IV zosyn   -D/C IVF  - F/U blood and urine cx  - monitor CBC, vitals

## 2019-08-03 NOTE — PROGRESS NOTE ADULT - SUBJECTIVE AND OBJECTIVE BOX
Patient is a 63y old  Male who presents with a chief complaint of RF hallux wound, Left foot TMA with open wound (02 Aug 2019 15:07)      SUBJECTIVE / OVERNIGHT EVENTS: no complaints.     MEDICATIONS  (STANDING):  chlorhexidine 4% Liquid 1 Application(s) Topical daily  dextrose 5%. 1000 milliLiter(s) (50 mL/Hr) IV Continuous <Continuous>  dextrose 50% Injectable 12.5 Gram(s) IV Push once  dextrose 50% Injectable 25 Gram(s) IV Push once  dextrose 50% Injectable 25 Gram(s) IV Push once  famotidine    Tablet 40 milliGRAM(s) Oral at bedtime  insulin glargine Injectable (LANTUS) 20 Unit(s) SubCutaneous at bedtime  insulin lispro (HumaLOG) corrective regimen sliding scale   SubCutaneous three times a day before meals  lactated ringers. 1000 milliLiter(s) (50 mL/Hr) IV Continuous <Continuous>  piperacillin/tazobactam IVPB.. 3.375 Gram(s) IV Intermittent every 8 hours  simvastatin 5 milliGRAM(s) Oral at bedtime  tamsulosin 0.4 milliGRAM(s) Oral at bedtime    MEDICATIONS  (PRN):  dextrose 40% Gel 15 Gram(s) Oral once PRN Blood Glucose LESS THAN 70 milliGRAM(s)/deciliter  glucagon  Injectable 1 milliGRAM(s) IntraMuscular once PRN Glucose LESS THAN 70 milligrams/deciliter      Vital Signs Last 24 Hrs  T(C): 37 (03 Aug 2019 05:45), Max: 37.1 (02 Aug 2019 21:01)  T(F): 98.6 (03 Aug 2019 05:45), Max: 98.7 (02 Aug 2019 21:01)  HR: 70 (03 Aug 2019 05:45) (70 - 77)  BP: 168/71 (03 Aug 2019 05:45) (148/63 - 168/71)  BP(mean): --  RR: 18 (03 Aug 2019 05:45) (17 - 18)  SpO2: 98% (03 Aug 2019 05:45) (98% - 100%)  CAPILLARY BLOOD GLUCOSE      POCT Blood Glucose.: 186 mg/dL (03 Aug 2019 07:53)  POCT Blood Glucose.: 308 mg/dL (02 Aug 2019 21:00)  POCT Blood Glucose.: 278 mg/dL (02 Aug 2019 17:02)  POCT Blood Glucose.: 211 mg/dL (02 Aug 2019 12:01)    I&O's Summary    02 Aug 2019 07:01  -  03 Aug 2019 07:00  --------------------------------------------------------  IN: 0 mL / OUT: 2250 mL / NET: -2250 mL        PHYSICAL EXAM:  GENERAL: NAD, well-developed  HEAD:  Atraumatic, Normocephalic  EYES: EOMI, PERRLA, conjunctiva and sclera clear  NECK: Supple, No JVD  CHEST/LUNG: Clear to auscultation bilaterally; No wheeze  HEART: Regular rate and rhythm; No murmurs, rubs, or gallops  ABDOMEN: Soft, Nontender, Nondistended; Bowel sounds present  EXTREMITIES:  (+) dressing b/l feet  PSYCH: AAOx3  NEUROLOGY: non-focal  SKIN: No rashes or lesions    LABS:                        8.0    2.95  )-----------( 61       ( 03 Aug 2019 05:40 )             24.5     08-03    139  |  99  |  9   ----------------------------<  196<H>  3.5   |  30  |  1.13    Ca    8.6      03 Aug 2019 05:40  Phos  2.8     08-  Mg     1.7     08-    TPro  6.7  /  Alb  3.1<L>  /  TBili  0.6  /  DBili  x   /  AST  21  /  ALT  54<H>  /  AlkPhos  174<H>  08-03          Urinalysis Basic - ( 02 Aug 2019 11:15 )    Color: LIGHT YELLOW / Appearance: CLEAR / S.010 / pH: 6.5  Gluc: NEGATIVE / Ketone: NEGATIVE  / Bili: NEGATIVE / Urobili: NORMAL   Blood: TRACE / Protein: 20 / Nitrite: NEGATIVE   Leuk Esterase: NEGATIVE / RBC: 3-5 / WBC 0-2   Sq Epi: OCC / Non Sq Epi: x / Bacteria: NEGATIVE        RADIOLOGY & ADDITIONAL TESTS:    Imaging Personally Reviewed:    Consultant(s) Notes Reviewed:  Podiatry    Care Discussed with Consultants/Other Providers:

## 2019-08-03 NOTE — CONSULT NOTE ADULT - SUBJECTIVE AND OBJECTIVE BOX
Patient is a 63y old  Male who presents with a chief complaint of RF hallux wound, Left foot TMA with open wound (02 Aug 2019 15:07)    HPI:  HPI:  64 y/o man with PMHx of extensive small cell lung cancer, metastatic to bone, and brain followed by Dr. Thao who presents fever, chills, urinary incontinence and a dry cough. History is obtained by his daughter Lin who is his health-care proxy and insisted on translation. Daughter reports the patient had a fever of 102.7 and has been very weak with chills. Patient denies diarrhea, nausea or vomiting. No sick contacts or recent travel, no CP or dyspnea. Last chemotherapy was on Monday, no new medications/recent antibiotic use.   Onc Hx:  Patient recently emigrated to the  from [facility] 2018 diagnosed 2018 with small cell carcinoma s/p Etoposide/Carboplatin 3/8/19, 2019-brain MRI showed a new tiny enhancing lesion in the left frontal subcortical region compatible with metastasis. Patient underwent gamma knife radiosurgery, completing 2019. 2019-Began Nivolumab, 2019-CT scan chest/abdomen/pelvis-new left upper lobe satellite nodules with increased left mediastinal and left hilar adenopathy, consistent with disease progression. Interval pathologic fracture of right posterior sixth rib, otherwise stable osseous metastases.   In the ED patient was found to be normotensive, satting 100% on RA, afebrile. Labs notable fro a leukocytosis of 11.92, platelets of 71, previously 107. CXR neg, UA neg. Given IV vanco, zosyn, admitted for further management.     PAST MEDICAL & SURGICAL HISTORY:  Lung cancer  Hyperlipidemia  Diabetes mellitus  Hypertension  History of foot surgery      Review of Systems:   CONSTITUTIONAL: No fever, weight loss, or fatigue  EYES: No eye pain, visual disturbances, or discharge  ENMT:  No difficulty hearing, tinnitus, vertigo; No sinus or throat pain  NECK: No pain or stiffness  BREASTS: No pain, masses, or nipple discharge  RESPIRATORY: No cough, wheezing, chills or hemoptysis; No shortness of breath  CARDIOVASCULAR: No chest pain, palpitations, dizziness, or leg swelling  GASTROINTESTINAL: No abdominal or epigastric pain. No nausea, vomiting, or hematemesis; No diarrhea or constipation. No melena or hematochezia.  GENITOURINARY: No dysuria, frequency, hematuria, or incontinence  NEUROLOGICAL: No headaches, memory loss, loss of strength, numbness, or tremors  SKIN: No itching, burning, rashes, or lesions   LYMPH NODES: No enlarged glands  ENDOCRINE: No heat or cold intolerance; No hair loss  MUSCULOSKELETAL: No joint pain or swelling; No muscle, back, or extremity pain  PSYCHIATRIC: No depression, anxiety, mood swings, or difficulty sleeping  HEME/LYMPH: No easy bruising, or bleeding gums  ALLERGY AND IMMUNOLOGIC: No hives or eczema    Allergies    No Known Allergies    Intolerances    Social History:   Former smoker    · 1PPD x 14 years-stopped in his 40's  Four children   · 3 living children-daughter helps in patient care; 1 son in [place] and 1 son in [place]    No alcohol use  No illicit drug use  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    FAMILY HISTORY:  No pertinent family history in first degree relatives      MEDICATIONS  (STANDING):    MEDICATIONS  (PRN):      T(C): 36.7 (19 @ 15:54), Max: 36.8 (19 @ 12:57)  HR: 81 (19 @ 15:54) (81 - 109)  BP: 122/63 (19 @ 15:54) (99/61 - 122/63)  RR: 18 (19 @ 15:54) (18 - 24)  SpO2: 100% (19 @ 15:54) (99% - 100%)    CAPILLARY BLOOD GLUCOSE      POCT Blood Glucose.: 234 mg/dL (2019 12:38)    I&O's Summary      PHYSICAL EXAM:  GENERAL: NAD, appears ill, cooperative with exam  HEAD:  Atraumatic, Normocephalic  EYES: EOMI, PERRLA, conjunctiva and sclera clear  NECK: Supple, No elevated JVD  CHEST/LUNG: Diminished at bases; No wheeze  HEART: Regular rate and rhythm; No murmurs, rubs, or gallops  ABDOMEN: Soft, Nontender, Nondistended; Bowel sounds present  EXTREMITIES:  2+ Peripheral Pulses, No clubbing, cyanosis, or edema  PSYCH: AAOx3  NEUROLOGY: CN II-XII grossly intact, moving all extremities  SKIN: No rashes or lesions    LABS:                        9.1    11.92 )-----------( 71       ( 2019 14:02 )             27.1     07-    133<L>  |  96<L>  |  24<H>  ----------------------------<  207<H>  3.9   |  26  |  1.56<H>    Ca    8.9      2019 13:52    TPro  6.9  /  Alb  3.4  /  TBili  0.8  /  DBili  x   /  AST  73<H>  /  ALT  111<H>  /  AlkPhos  172<H>            Urinalysis Basic - ( 2019 14:57 )    Color: LIGHT YELLOW / Appearance: CLEAR / S.012 / pH: 6.0  Gluc: NEGATIVE / Ketone: NEGATIVE  / Bili: NEGATIVE / Urobili: NORMAL   Blood: NEGATIVE / Protein: 30 / Nitrite: NEGATIVE   Leuk Esterase: NEGATIVE / RBC: 3-5 / WBC 0-2   Sq Epi: OCC / Non Sq Epi: x / Bacteria: NEGATIVE          RADIOLOGY & ADDITIONAL TESTS:    ECG Personally Reviewed -     Imaging Personally Reviewed:    Consultant(s) Notes Reviewed:      Care Discussed with Consultants/Other Providers: (2019 17:23)     prior hospital charts reviewed [  ]  primary team notes reviewed [  ]  other consultant notes reviewed [  ]  PAST MEDICAL & SURGICAL HISTORY:  Lung cancer  Hyperlipidemia  Diabetes mellitus  Hypertension  History of foot surgery    Allergies  No Known Allergies    ANTIMICROBIALS (past 90 days)  MEDICATIONS  (STANDING):  piperacillin/tazobactam IVPB.   200 mL/Hr IV Intermittent (19 @ 18:45)    piperacillin/tazobactam IVPB.   200 mL/Hr IV Intermittent (19 @ 13:25)    piperacillin/tazobactam IVPB..   25 mL/Hr IV Intermittent (19 @ 08:13)   25 mL/Hr IV Intermittent (19 @ 02:29)   25 mL/Hr IV Intermittent (19 @ 18:12)   25 mL/Hr IV Intermittent (19 @ 09:36)   25 mL/Hr IV Intermittent (19 @ 01:36)    piperacillin/tazobactam IVPB..   25 mL/Hr IV Intermittent (19 @ 06:19)   25 mL/Hr IV Intermittent (19 @ 22:50)   25 mL/Hr IV Intermittent (19 @ 17:14)    vancomycin  IVPB   250 mL/Hr IV Intermittent (19 @ 14:24)      ANTIMICROBIALS:    piperacillin/tazobactam IVPB.. 3.375 every 8 hours    OTHER MEDS: MEDICATIONS  (STANDING):  dextrose 40% Gel 15 once PRN  dextrose 50% Injectable 12.5 once  dextrose 50% Injectable 25 once  dextrose 50% Injectable 25 once  famotidine    Tablet 40 at bedtime  glucagon  Injectable 1 once PRN  insulin glargine Injectable (LANTUS) 20 at bedtime  insulin lispro (HumaLOG) corrective regimen sliding scale  three times a day before meals  simvastatin 5 at bedtime  tamsulosin 0.4 at bedtime    SOCIAL HISTORY:   hx smoking  non-smoker    FAMILY HISTORY:  No pertinent family history in first degree relatives    REVIEW OF SYSTEMS  [  ] ROS unobtainable because:    [  ] All other systems negative except as noted below:	    Constitutional:  [ ] fever [ ] chills  [ ] weight loss  [ ] weakness  Skin:  [ ] rash [ ] phlebitis	  Eyes: [ ] icterus [ ] pain  [ ] discharge	  ENMT: [ ] sore throat  [ ] thrush [ ] ulcers [ ] exudates  Respiratory: [ ] dyspnea [ ] hemoptysis [ ] cough [ ] sputum	  Cardiovascular:  [ ] chest pain [ ] palpitations [ ] edema	  Gastrointestinal:  [ ] nausea [ ] vomiting [ ] diarrhea [ ] constipation [ ] pain	  Genitourinary:  [ ] dysuria [ ] frequency [ ] hematuria [ ] discharge [ ] flank pain  [ ] incontinence  Musculoskeletal:  [ ] myalgias [ ] arthralgias [ ] arthritis  [ ] back pain  Neurological:  [ ] headache [ ] seizures  [ ] confusion/altered mental status  Psychiatric:  [ ] anxiety [ ] depression	  Hematology/Lymphatics:  [ ] lymphadenopathy  Endocrine:  [ ] adrenal [ ] thyroid  Allergic/Immunologic:	 [ ] transplant [ ] seasonal    Vital Signs Last 24 Hrs  T(F): 98.6 (19 @ 05:45), Max: 100.8 (19 @ 01:00)  Vital Signs Last 24 Hrs  HR: 70 (19 @ 05:45) (70 - 77)  BP: 168/71 (19 @ 05:45) (148/63 - 168/71)  RR: 18 (19 @ 05:45)  SpO2: 98% (19 @ 05:45) (98% - 100%)  Wt(kg): --    EXAM:  Constitutional: Patient is alert, oriented to time, place, person. Not in acute distress  Eyes: pupils bilaterally reactive to light. No icterus.  Oral cavity: Clear, no lesions  Neck: No neck vein distension noted  RS: Chest clear to auscultation bilaterally. No wheeze/rhonchi/crepitations.  CVS: S1, S2 heard. Regular rate and rhythm. No murmurs/rubs/gallops.  Abdomen: Soft. No guarding/rigidity/tenderness.  : No acute abnormalities  Extremities: Warm. No pedal edema  Skin: No lesions noted  Vascular: No evidence of phlebitis  Neuro: Alert, oriented to time/place/person  Cranial nerves 2-12 grossly normal. No focal abnormalities                          8.0    2.95  )-----------( 61       ( 03 Aug 2019 05:40 )             24.5     08-03    139  |  99  |  9   ----------------------------<  196<H>  3.5   |  30  |  1.13    Ca    8.6      03 Aug 2019 05:40  Phos  2.8     08-03  Mg     1.7     08-03    TPro  6.7  /  Alb  3.1<L>  /  TBili  0.6  /  DBili  x   /  AST  21  /  ALT  54<H>  /  AlkPhos  174<H>  08-03    Urinalysis Basic - ( 02 Aug 2019 11:15 )    Color: LIGHT YELLOW / Appearance: CLEAR / S.010 / pH: 6.5  Gluc: NEGATIVE / Ketone: NEGATIVE  / Bili: NEGATIVE / Urobili: NORMAL   Blood: TRACE / Protein: 20 / Nitrite: NEGATIVE   Leuk Esterase: NEGATIVE / RBC: 3-5 / WBC 0-2   Sq Epi: OCC / Non Sq Epi: x / Bacteria: NEGATIVE    MICROBIOLOGY:  Culture - Urine (collected 02 Aug 2019 11:15)  Source: URINE CATHETER  Final Report (03 Aug 2019 08:35):    NO GROWTH AT 24 HOURS    Culture - Blood (collected 2019 15:33)  Source: BLOOD VENOUS  Preliminary Report (02 Aug 2019 15:33):    NO ORGANISMS ISOLATED    NO ORGANISMS ISOLATED AT 48 HRS.    Culture - Blood (collected 2019 15:33)  Source: BLOOD PERIPHERAL  Preliminary Report (02 Aug 2019 15:34):    NO ORGANISMS ISOLATED    NO ORGANISMS ISOLATED AT 48 HRS.    Culture - Urine (collected 2019 15:24)  Source: URINE MIDSTREAM  Final Report (02 Aug 2019 08:56):    Culture grew 3 or more types of organisms which indicate    collection contamination; consider recollection only if    clinically indicated.                    RADIOLOGY:  imaging below personally reviewed    OTHER TESTS: [place] [employer]  used (ID: [id number])  ----------------  Patient is a 63y old  Male who presents with a chief complaint of RF hallux wound, Left foot TMA with open wound (02 Aug 2019 15:07)    HPI:  HPI:  64 y/o man with PMHx of extensive small cell lung cancer, metastatic to bone, and brain followed by Dr. Thao who presents fever, chills, urinary incontinence and a dry cough. History is obtained by his daughter Lin who is his health-care proxy and insisted on translation. Daughter reports the patient had a fever of 102.7 and has been very weak with chills. Patient denies diarrhea, nausea or vomiting. No sick contacts or recent travel, no CP or dyspnea. Last chemotherapy was on Monday, no new medications/recent antibiotic use.   Onc Hx:  Patient recently emigrated to the  from [facility] 2018 diagnosed 2018 with small cell carcinoma s/p Etoposide/Carboplatin 3/8/19, 2019-brain MRI showed a new tiny enhancing lesion in the left frontal subcortical region compatible with metastasis. Patient underwent gamma knife radiosurgery, completing 2019. 2019-Began Nivolumab, 2019-CT scan chest/abdomen/pelvis-new left upper lobe satellite nodules with increased left mediastinal and left hilar adenopathy, consistent with disease progression. Interval pathologic fracture of right posterior sixth rib, otherwise stable osseous metastases.   In the ED patient was found to be normotensive, satting 100% on RA, afebrile. Labs notable fro a leukocytosis of 11.92, platelets of 71, previously 107. CXR neg, UA neg. Given IV vanco, zosyn, admitted for further management.   ----------  - evaluated by Podiatry 19. Right foot - pus expressed at bedside, debrided. Left foot - granular base noted, no pus  - Xrays suggestive of age-indeterminate fractures of rt foot distal phalanx. ESR and CRP elevated, unclear if b/l leg wounds were contributing to signs of sepsis  - Eval by urology for leaking urine and incontinence 19, Calderon placed  - ID consulted for abx recs  --------  - Above HPI reviewed and reconciled with patient  - rt foot surgery performed in [facility] many years back after traumatic foot injury and infection. Family reports gradually worsening non-healing wound over left foot since 1 year  - Denied night sweats, rash, cough, coryza, loose stools, recent travel    PAST MEDICAL & SURGICAL HISTORY:  Lung cancer  Hyperlipidemia  Diabetes mellitus  Hypertension  History of foot surgery - TMA on left foot many years back after traumatic foot injury and infection.       Review of Systems:   CONSTITUTIONAL: No fever, weight loss, or fatigue  EYES: No eye pain, visual disturbances, or discharge  ENMT:  No difficulty hearing, tinnitus, vertigo; No sinus or throat pain  NECK: No pain or stiffness  BREASTS: No pain, masses, or nipple discharge  RESPIRATORY: No cough, wheezing, chills or hemoptysis; No shortness of breath  CARDIOVASCULAR: No chest pain, palpitations, dizziness, or leg swelling  GASTROINTESTINAL: No abdominal or epigastric pain. No nausea, vomiting, or hematemesis; No diarrhea or constipation. No melena or hematochezia.  GENITOURINARY: No dysuria, frequency, hematuria, or incontinence  NEUROLOGICAL: No headaches, memory loss, loss of strength, numbness, or tremors  SKIN: No itching, burning, rashes, or lesions   LYMPH NODES: No enlarged glands  ENDOCRINE: No heat or cold intolerance; No hair loss  MUSCULOSKELETAL: No joint pain or swelling; No muscle, back, or extremity pain  PSYCHIATRIC: No depression, anxiety, mood swings, or difficulty sleeping  HEME/LYMPH: No easy bruising, or bleeding gums  ALLERGY AND IMMUNOLOGIC: No hives or eczema    Allergies: No Known Allergies    Intolerances    Social History:   - Former smoker 1PPD x 14 years-stopped in his 40's  - Four children: 3 living children-daughter helps in patient care; 1 son in [facility] and 1 son in Hospitals in [place]  - migrated from [facility] Dec 2018    No alcohol use  No illicit drug use  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    FAMILY HISTORY:  No pertinent family history in first degree relatives      MEDICATIONS  (STANDING):    MEDICATIONS  (PRN):      T(C): 36.7 (19 @ 15:54), Max: 36.8 (19 @ 12:57)  HR: 81 (19 @ 15:54) (81 - 109)  BP: 122/63 (19 @ 15:54) (99/61 - 122/63)  RR: 18 (19 @ 15:54) (18 - 24)  SpO2: 100% (19 @ 15:54) (99% - 100%)    CAPILLARY BLOOD GLUCOSE      POCT Blood Glucose.: 234 mg/dL (2019 12:38)    I&O's Summary      PHYSICAL EXAM:  GENERAL: NAD, appears ill, cooperative with exam  HEAD:  Atraumatic, Normocephalic  EYES: EOMI, PERRLA, conjunctiva and sclera clear  NECK: Supple, No elevated JVD  CHEST/LUNG: Diminished at bases; No wheeze  HEART: Regular rate and rhythm; No murmurs, rubs, or gallops  ABDOMEN: Soft, Nontender, Nondistended; Bowel sounds present  EXTREMITIES:  2+ Peripheral Pulses, No clubbing, cyanosis, or edema  PSYCH: AAOx3  NEUROLOGY: CN II-XII grossly intact, moving all extremities  SKIN: No rashes or lesions    LABS:                        9.1    11.92 )-----------( 71       ( 2019 14:02 )             27.1         133<L>  |  96<L>  |  24<H>  ----------------------------<  207<H>  3.9   |  26  |  1.56<H>    Ca    8.9      2019 13:52    TPro  6.9  /  Alb  3.4  /  TBili  0.8  /  DBili  x   /  AST  73<H>  /  ALT  111<H>  /  AlkPhos  172<H>            Urinalysis Basic - ( 2019 14:57 )    Color: LIGHT YELLOW / Appearance: CLEAR / S.012 / pH: 6.0  Gluc: NEGATIVE / Ketone: NEGATIVE  / Bili: NEGATIVE / Urobili: NORMAL   Blood: NEGATIVE / Protein: 30 / Nitrite: NEGATIVE   Leuk Esterase: NEGATIVE / RBC: 3-5 / WBC 0-2   Sq Epi: OCC / Non Sq Epi: x / Bacteria: NEGATIVE          RADIOLOGY & ADDITIONAL TESTS:    ECG Personally Reviewed -     Imaging Personally Reviewed:    Consultant(s) Notes Reviewed:      Care Discussed with Consultants/Other Providers: (2019 17:23)     prior hospital charts reviewed [  ]  primary team notes reviewed [  ]  other consultant notes reviewed [  ]  PAST MEDICAL & SURGICAL HISTORY:  Lung cancer  Hyperlipidemia  Diabetes mellitus  Hypertension  History of foot surgery    Allergies  No Known Allergies    ANTIMICROBIALS (past 90 days)  MEDICATIONS  (STANDING):  piperacillin/tazobactam IVPB.   200 mL/Hr IV Intermittent (19 @ 18:45)    piperacillin/tazobactam IVPB.   200 mL/Hr IV Intermittent (19 @ 13:25)    piperacillin/tazobactam IVPB..   25 mL/Hr IV Intermittent (19 @ 08:13)   25 mL/Hr IV Intermittent (19 @ 02:29)   25 mL/Hr IV Intermittent (19 @ 18:12)   25 mL/Hr IV Intermittent (19 @ 09:36)   25 mL/Hr IV Intermittent (19 @ 01:36)    piperacillin/tazobactam IVPB..   25 mL/Hr IV Intermittent (19 @ 06:19)   25 mL/Hr IV Intermittent (19 @ 22:50)   25 mL/Hr IV Intermittent (19 @ 17:14)    vancomycin  IVPB   250 mL/Hr IV Intermittent (19 @ 14:24)      ANTIMICROBIALS:    piperacillin/tazobactam IVPB.. 3.375 every 8 hours    OTHER MEDS: MEDICATIONS  (STANDING):  dextrose 40% Gel 15 once PRN  dextrose 50% Injectable 12.5 once  dextrose 50% Injectable 25 once  dextrose 50% Injectable 25 once  famotidine    Tablet 40 at bedtime  glucagon  Injectable 1 once PRN  insulin glargine Injectable (LANTUS) 20 at bedtime  insulin lispro (HumaLOG) corrective regimen sliding scale  three times a day before meals  simvastatin 5 at bedtime  tamsulosin 0.4 at bedtime    SOCIAL HISTORY:   hx smoking  non-smoker    FAMILY HISTORY:  No pertinent family history in first degree relatives    REVIEW OF SYSTEMS  [  ] ROS unobtainable because:    [  ] All other systems negative except as noted below:	    Constitutional:  [ ] fever [ ] chills  [ ] weight loss  [ ] weakness  Skin:  [ ] rash [ ] phlebitis	  Eyes: [ ] icterus [ ] pain  [ ] discharge	  ENMT: [ ] sore throat  [ ] thrush [ ] ulcers [ ] exudates  Respiratory: [ ] dyspnea [ ] hemoptysis [ ] cough [ ] sputum	  Cardiovascular:  [ ] chest pain [ ] palpitations [ ] edema	  Gastrointestinal:  [ ] nausea [ ] vomiting [ ] diarrhea [ ] constipation [ ] pain	  Genitourinary:  [ ] dysuria [ ] frequency [ ] hematuria [ ] discharge [ ] flank pain  [ ] incontinence  Musculoskeletal:  [ ] myalgias [ ] arthralgias [ ] arthritis  [ ] back pain  Neurological:  [ ] headache [ ] seizures  [ ] confusion/altered mental status  Psychiatric:  [ ] anxiety [ ] depression	  Hematology/Lymphatics:  [ ] lymphadenopathy  Endocrine:  [ ] adrenal [ ] thyroid  Allergic/Immunologic:	 [ ] transplant [ ] seasonal    Vital Signs Last 24 Hrs  T(F): 98.6 (19 @ 05:45), Max: 100.8 (19 @ 01:00)  Vital Signs Last 24 Hrs  HR: 70 (19 @ 05:45) (70 - 77)  BP: 168/71 (19 @ 05:45) (148/63 - 168/71)  RR: 18 (19 @ 05:45)  SpO2: 98% (19 @ 05:45) (98% - 100%)  Wt(kg): --    EXAM:  Constitutional: Patient is alert, oriented to time, place, person. Not in acute distress  Eyes: pupils bilaterally reactive to light. No icterus.  Oral cavity: Clear, no lesions  Neck: No neck vein distension noted  RS: Chest clear to auscultation bilaterally. No wheeze/rhonchi/crepitations.  CVS: S1, S2 heard. Regular rate and rhythm. No murmurs/rubs/gallops.  Abdomen: Soft. No guarding/rigidity/tenderness.  : No acute abnormalities  Extremities: Warm. No pedal edema  Skin: No lesions noted  Vascular: No evidence of phlebitis  Neuro: Alert, oriented to time/place/person  Cranial nerves 2-12 grossly normal. No focal abnormalities                          8.0    2.95  )-----------( 61       ( 03 Aug 2019 05:40 )             24.5         139  |  99  |  9   ----------------------------<  196<H>  3.5   |  30  |  1.13    Ca    8.6      03 Aug 2019 05:40  Phos  2.8     -  Mg     1.7     -    TPro  6.7  /  Alb  3.1<L>  /  TBili  0.6  /  DBili  x   /  AST  21  /  ALT  54<H>  /  AlkPhos  174<H>  08-    Urinalysis Basic - ( 02 Aug 2019 11:15 )    Color: LIGHT YELLOW / Appearance: CLEAR / S.010 / pH: 6.5  Gluc: NEGATIVE / Ketone: NEGATIVE  / Bili: NEGATIVE / Urobili: NORMAL   Blood: TRACE / Protein: 20 / Nitrite: NEGATIVE   Leuk Esterase: NEGATIVE / RBC: 3-5 / WBC 0-2   Sq Epi: OCC / Non Sq Epi: x / Bacteria: NEGATIVE    MICROBIOLOGY:  Culture - Urine (collected 02 Aug 2019 11:15)  Source: URINE CATHETER  Final Report (03 Aug 2019 08:35):    NO GROWTH AT 24 HOURS    Culture - Blood (collected 2019 15:33)  Source: BLOOD VENOUS  Preliminary Report (02 Aug 2019 15:33):    NO ORGANISMS ISOLATED    NO ORGANISMS ISOLATED AT 48 HRS.    Culture - Blood (collected 2019 15:33)  Source: BLOOD PERIPHERAL  Preliminary Report (02 Aug 2019 15:34):    NO ORGANISMS ISOLATED    NO ORGANISMS ISOLATED AT 48 HRS.    Culture - Urine (collected 2019 15:24)  Source: URINE MIDSTREAM  Final Report (02 Aug 2019 08:56):    Culture grew 3 or more types of organisms which indicate    collection contamination; consider recollection only if    clinically indicated.                    RADIOLOGY:  imaging below personally reviewed    OTHER TESTS: [place] [employer]  used (ID: [id number])  ----------------  Patient is a 63y old  Male who presents with a chief complaint of RF hallux wound, Left foot TMA with open wound (02 Aug 2019 15:07)    HPI:  HPI:  62 y/o man with PMHx of extensive small cell lung cancer, metastatic to bone, and brain followed by Dr. Thao who presents fever, chills, urinary incontinence and a dry cough. History is obtained by his daughter Lin who is his health-care proxy and insisted on translation. Daughter reports the patient had a fever of 102.7 and has been very weak with chills. Patient denies diarrhea, nausea or vomiting. No sick contacts or recent travel, no CP or dyspnea. Last chemotherapy was on Monday, no new medications/recent antibiotic use.   Onc Hx:  Patient recently emigrated to the  from [facility] 2018 diagnosed 2018 with small cell carcinoma s/p Etoposide/Carboplatin 3/8/19, 2019-brain MRI showed a new tiny enhancing lesion in the left frontal subcortical region compatible with metastasis. Patient underwent gamma knife radiosurgery, completing 2019. 2019-Began Nivolumab, 2019-CT scan chest/abdomen/pelvis-new left upper lobe satellite nodules with increased left mediastinal and left hilar adenopathy, consistent with disease progression. Interval pathologic fracture of right posterior sixth rib, otherwise stable osseous metastases.   In the ED patient was found to be normotensive, satting 100% on RA, afebrile. Labs notable fro a leukocytosis of 11.92, platelets of 71, previously 107. CXR neg, UA neg. Given IV vanco, zosyn, admitted for further management.   ----------  - evaluated by Podiatry 19. Right foot - pus expressed at bedside, debrided. Left foot - granular base noted, no pus  - Xrays suggestive of age-indeterminate fractures of rt foot distal phalanx. ESR and CRP elevated, unclear if b/l leg wounds were contributing to signs of sepsis  - Eval by urology for leaking urine and incontinence 19, Calderon placed  - ID consulted for abx recs  --------  - Above HPI reviewed and reconciled with patient  - rt foot surgery performed in [facility] many years back after traumatic foot injury and infection. Family reports gradually worsening non-healing wound over left foot since 1 year  - Denied night sweats, rash, cough, coryza, loose stools, recent travel  - pt cannot recall injury to rt foot    PAST MEDICAL & SURGICAL HISTORY:  Lung cancer  Hyperlipidemia  Diabetes mellitus  Hypertension  History of foot surgery - TMA on left foot many years back after traumatic foot injury and infection.       Review of Systems:   CONSTITUTIONAL: No fever, weight loss, or fatigue  EYES: No eye pain, visual disturbances, or discharge  ENMT:  No difficulty hearing, tinnitus, vertigo; No sinus or throat pain  NECK: No pain or stiffness  BREASTS: No pain, masses, or nipple discharge  RESPIRATORY: No cough, wheezing, chills or hemoptysis; No shortness of breath  CARDIOVASCULAR: No chest pain, palpitations, dizziness, or leg swelling  GASTROINTESTINAL: No abdominal or epigastric pain. No nausea, vomiting, or hematemesis; No diarrhea or constipation. No melena or hematochezia.  GENITOURINARY: No dysuria, frequency, hematuria, or incontinence  NEUROLOGICAL: No headaches, memory loss, loss of strength, numbness, or tremors  SKIN: No itching, burning, rashes, or lesions   LYMPH NODES: No enlarged glands  ENDOCRINE: No heat or cold intolerance; No hair loss  MUSCULOSKELETAL: No joint pain or swelling; No muscle, back, or extremity pain  PSYCHIATRIC: No depression, anxiety, mood swings, or difficulty sleeping  HEME/LYMPH: No easy bruising, or bleeding gums  ALLERGY AND IMMUNOLOGIC: No hives or eczema    Allergies: No Known Allergies    Intolerances    Social History:   - Former smoker 1PPD x 14 years-stopped in his 40's  - Four children: 3 living children-daughter helps in patient care; 1 son in [facility] and 1 son in [facility]  - migrated from [facility] Dec 2018    No alcohol use  No illicit drug use  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   200 mL/Hr IV Intermittent (19 @ 18:45)    piperacillin/tazobactam IVPB.   200 mL/Hr IV Intermittent (19 @ 13:25)    piperacillin/tazobactam IVPB..   25 mL/Hr IV Intermittent (19 @ 08:13)   25 mL/Hr IV Intermittent (19 @ 02:29)   25 mL/Hr IV Intermittent (19 @ 18:12)   25 mL/Hr IV Intermittent (19 @ 09:36)   25 mL/Hr IV Intermittent (19 @ 01:36)    piperacillin/tazobactam IVPB..   25 mL/Hr IV Intermittent (19 @ 06:19)   25 mL/Hr IV Intermittent (19 @ 22:50)   25 mL/Hr IV Intermittent (19 @ 17:14)    vancomycin  IVPB   250 mL/Hr IV Intermittent (19 @ 14:24)      ANTIMICROBIALS:    piperacillin/tazobactam IVPB.. 3.375 every 8 hours    OTHER MEDS: MEDICATIONS  (STANDING):  dextrose 40% Gel 15 once PRN  dextrose 50% Injectable 12.5 once  dextrose 50% Injectable 25 once  dextrose 50% Injectable 25 once  famotidine    Tablet 40 at bedtime  glucagon  Injectable 1 once PRN  insulin glargine Injectable (LANTUS) 20 at bedtime  insulin lispro (HumaLOG) corrective regimen sliding scale  three times a day before meals  simvastatin 5 at bedtime  tamsulosin 0.4 at bedtime    SOCIAL HISTORY:   hx smoking  non-smoker    FAMILY HISTORY:  No pertinent family history in first degree relatives    REVIEW OF SYSTEMS  All other systems negative except as noted below:	  Constitutional:  [X] fever [X] chills  [ ] weight loss  [ ] weakness  Skin:  [ ] rash [ ] phlebitis [X] wounds	  Eyes: [ ] icterus [ ] pain  [ ] discharge	  ENMT: [ ] sore throat  [ ] thrush [ ] ulcers [ ] exudates  Respiratory: [ ] dyspnea [ ] hemoptysis [X] cough [X] sputum	  Cardiovascular:  [ ] chest pain [ ] palpitations [ ] edema	  Gastrointestinal:  [ ] nausea [ ] vomiting [ ] diarrhea [ ] constipation [ ] pain	  Genitourinary:  [ ] dysuria [ ] frequency [ ] hematuria [ ] discharge [ ] flank pain  [ ] incontinence  Musculoskeletal:  [ ] myalgias [ ] arthralgias [ ] arthritis  [ ] back pain  Neurological:  [ ] headache [ ] seizures  [ ] confusion/altered mental status  Psychiatric:  [ ] anxiety [ ] depression	  Hematology/Lymphatics:  [ ] lymphadenopathy  Endocrine:  [ ] adrenal [ ] thyroid  Allergic/Immunologic:	 [ ] transplant [ ] seasonal    Vital Signs Last 24 Hrs  T(F): 98.6 (19 @ 05:45), Max: 100.8 (19 @ 01:00)  Vital Signs Last 24 Hrs  HR: 70 (19 @ 05:45) (70 - 77)  BP: 168/71 (19 @ 05:45) (148/63 - 168/71)  RR: 18 (19 @ 05:45)  SpO2: 98% (19 @ 05:45) (98% - 100%)  Wt(kg): --    EXAM:  Constitutional: Patient is alert, oriented to time, place, person. Not in acute distress  Eyes: pupils bilaterally reactive to light. No icterus.  Oral cavity: Clear, no lesions  Neck: No neck vein distension noted  RS: Chest clear to auscultation bilaterally. B/l fine basal crepitations. No wheeze/rhonchi.  CVS: S1, S2 heard. Regular rate and rhythm. No murmurs/rubs/gallops.  Abdomen: Soft. No guarding/rigidity/tenderness.  : No acute abnormalities  Extremities: Warm. No pedal edema  Skin: LLE: Ulcer noted over TMA site. Minimal discharge, healthy base. No s/o inflammation  RLE: Ulcer noted over plantar aspect of great toe. No s/o inflammation  Vascular: No evidence of phlebitis  Neuro: Alert, oriented to time/place/person  Cranial nerves 2-12 grossly normal. No focal abnormalities                          8.0    2.95  )-----------( 61       ( 03 Aug 2019 05:40 )             24.5     08-03    139  |  99  |  9   ----------------------------<  196<H>  3.5   |  30  |  1.13    Ca    8.6      03 Aug 2019 05:40  Phos  2.8     08-  Mg     1.7     08-03    TPro  6.7  /  Alb  3.1<L>  /  TBili  0.6  /  DBili  x   /  AST  21  /  ALT  54<H>  /  AlkPhos  174<H>  08-    Urinalysis Basic - ( 02 Aug 2019 11:15 )    Color: LIGHT YELLOW / Appearance: CLEAR / S.010 / pH: 6.5  Gluc: NEGATIVE / Ketone: NEGATIVE  / Bili: NEGATIVE / Urobili: NORMAL   Blood: TRACE / Protein: 20 / Nitrite: NEGATIVE   Leuk Esterase: NEGATIVE / RBC: 3-5 / WBC 0-2   Sq Epi: OCC / Non Sq Epi: x / Bacteria: NEGATIVE    MICROBIOLOGY:  Culture - Urine (collected 02 Aug 2019 11:15)  Source: URINE CATHETER  Final Report (03 Aug 2019 08:35):    NO GROWTH AT 24 HOURS    Culture - Blood (collected 2019 15:33)  Source: BLOOD VENOUS  Preliminary Report (02 Aug 2019 15:33):    NO ORGANISMS ISOLATED    NO ORGANISMS ISOLATED AT 48 HRS.    Culture - Blood (collected 2019 15:33)  Source: BLOOD PERIPHERAL  Preliminary Report (02 Aug 2019 15:34):    NO ORGANISMS ISOLATED    NO ORGANISMS ISOLATED AT 48 HRS.    Culture - Urine (collected 2019 15:24)  Source: URINE MIDSTREAM  Final Report (02 Aug 2019 08:56):    Culture grew 3 or more types of organisms which indicate    collection contamination; consider recollection only if    clinically indicated.      RADIOLOGY:  imaging below personally reviewed  < from: Xray Foot AP + Lateral + Oblique, Bilat (19 @ 18:41) >  Age-indeterminate slightly distracted obliquely oriented right hallux   distal phalangeal tuft fracture. No dislocations or additional fractures   on either side.    Left foot TMA defect noted. No proximally tracking gas collections or   radiopaque foreign bodies. No definite radiographic evidence for   osteomyelitis in either foot.    Tarsometatarsal alignment maintained without evidence for a Lisfranc   injury.    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 Preserved remaining joint spaces   and no joint margin erosions.    Bilateral posterosuperior calcaneal Lourdes deformities. Thick irregular   left plantar calcaneal enthesophytes. Small right calcaneal enthesophyte.    No discrete lytic or blastic lesions.    < end of copied text >      OTHER TESTS: [place]  used (ID: [id number])  ----------------  Patient is a 63y old  Male who presents with a chief complaint of RF hallux wound, Left foot TMA with open wound (02 Aug 2019 15:07)    HPI:  62 y/o man with PMHx of extensive small cell lung cancer, metastatic to bone, and brain followed by Dr. Thao who presents fever, chills, urinary incontinence and a dry cough. History is obtained by his daughter Lin who is his health-care proxy and insisted on translation. Daughter reports the patient had a fever of 102.7 and has been very weak with chills. Patient denies diarrhea, nausea or vomiting. No sick contacts or recent travel, no CP or dyspnea. Last chemotherapy was on Monday, no new medications/recent antibiotic use.   Onc Hx:  Patient recently emigrated to the  from [facility] 2018 diagnosed 2018 with small cell carcinoma s/p Etoposide/Carboplatin 3/8/19, 2019-brain MRI showed a new tiny enhancing lesion in the left frontal subcortical region compatible with metastasis. Patient underwent gamma knife radiosurgery, completing 2019. 2019-Began Nivolumab, 2019-CT scan chest/abdomen/pelvis-new left upper lobe satellite nodules with increased left mediastinal and left hilar adenopathy, consistent with disease progression. Interval pathologic fracture of right posterior sixth rib, otherwise stable osseous metastases.   In the ED patient was found to be normotensive, satting 100% on RA, afebrile. Labs notable fro a leukocytosis of 11.92, platelets of 71, previously 107. CXR neg, UA neg. Given IV vanco, zosyn, admitted for further management.   ----------  - evaluated by Podiatry 19. Right foot - pus expressed at bedside, debrided. Left foot - granular base noted, no pus  - Xrays suggestive of age-indeterminate fractures of rt foot distal phalanx. ESR and CRP elevated, unclear if b/l leg wounds were contributing to signs of sepsis  - Eval by urology for leaking urine and incontinence 19, Calderon placed  - ID consulted for abx recs  --------  - Above HPI reviewed and reconciled with patient  - rt foot surgery performed in [facility] many years back after traumatic foot injury and infection. Family reports gradually worsening non-healing wound over left foot since 1 year  - Denied night sweats, rash, cough, coryza, loose stools, recent travel  - pt cannot recall injury to rt foot    PAST MEDICAL & SURGICAL HISTORY:  Lung cancer  Hyperlipidemia  Diabetes mellitus  Hypertension  History of foot surgery - TMA on left foot many years back after traumatic foot injury and infection.     Review of Systems:   CONSTITUTIONAL: No fever, weight loss, or fatigue  EYES: No eye pain, visual disturbances, or discharge  ENMT:  No difficulty hearing, tinnitus, vertigo; No sinus or throat pain  NECK: No pain or stiffness  BREASTS: No pain, masses, or nipple discharge  RESPIRATORY: No cough, wheezing, chills or hemoptysis; No shortness of breath  CARDIOVASCULAR: No chest pain, palpitations, dizziness, or leg swelling  GASTROINTESTINAL: No abdominal or epigastric pain. No nausea, vomiting, or hematemesis; No diarrhea or constipation. No melena or hematochezia.  GENITOURINARY: No dysuria, frequency, hematuria, or incontinence  NEUROLOGICAL: No headaches, memory loss, loss of strength, numbness, or tremors  SKIN: No itching, burning, rashes, or lesions   LYMPH NODES: No enlarged glands  ENDOCRINE: No heat or cold intolerance; No hair loss  MUSCULOSKELETAL: No joint pain or swelling; No muscle, back, or extremity pain  PSYCHIATRIC: No depression, anxiety, mood swings, or difficulty sleeping  HEME/LYMPH: No easy bruising, or bleeding gums  ALLERGY AND IMMUNOLOGIC: No hives or eczema    Allergies: No Known Allergies    Social History:   - Former smoker 1PPD x 14 years-stopped in his 40's  - Four children: 3 living children-daughter helps in patient care; 1 son in [facility] and 1 son in [place]  - migrated from [facility] Dec 2018    No alcohol use  No illicit drug use  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 3.375 every 8 hours    OTHER MEDS: MEDICATIONS  (STANDING):  dextrose 40% Gel 15 once PRN  dextrose 50% Injectable 12.5 once  dextrose 50% Injectable 25 once  dextrose 50% Injectable 25 once  famotidine    Tablet 40 at bedtime  glucagon  Injectable 1 once PRN  insulin glargine Injectable (LANTUS) 20 at bedtime  insulin lispro (HumaLOG) corrective regimen sliding scale  three times a day before meals  simvastatin 5 at bedtime  tamsulosin 0.4 at bedtime    SOCIAL HISTORY:   hx smoking  non-smoker    FAMILY HISTORY:  No pertinent family history in first degree relatives    REVIEW OF SYSTEMS  All other systems negative except as noted below:	  Constitutional:  [X] fever [X] chills  [ ] weight loss  [ ] weakness  Skin:  [ ] rash [ ] phlebitis [X] wounds	  Eyes: [ ] icterus [ ] pain  [ ] discharge	  ENMT: [ ] sore throat  [ ] thrush [ ] ulcers [ ] exudates  Respiratory: [ ] dyspnea [ ] hemoptysis [X] cough [X] sputum	  Cardiovascular:  [ ] chest pain [ ] palpitations [ ] edema	  Gastrointestinal:  [ ] nausea [ ] vomiting [ ] diarrhea [ ] constipation [ ] pain	  Genitourinary:  [ ] dysuria [ ] frequency [ ] hematuria [ ] discharge [ ] flank pain  [ ] incontinence  Musculoskeletal:  [ ] myalgias [ ] arthralgias [ ] arthritis  [ ] back pain  Neurological:  [ ] headache [ ] seizures  [ ] confusion/altered mental status  Psychiatric:  [ ] anxiety [ ] depression	  Hematology/Lymphatics:  [ ] lymphadenopathy  Endocrine:  [ ] adrenal [ ] thyroid  Allergic/Immunologic:	 [ ] transplant [ ] seasonal    Vital Signs Last 24 Hrs  T(F): 98.6 (19 @ 05:45), Max: 100.8 (19 @ 01:00)  Vital Signs Last 24 Hrs  HR: 70 (19 @ 05:45) (70 - 77)  BP: 168/71 (19 @ 05:45) (148/63 - 168/71)  RR: 18 (19 @ 05:45)  SpO2: 98% (19 @ 05:45) (98% - 100%)    EXAM:  Constitutional: Patient is alert, oriented to time, place, person. Not in acute distress  Eyes: pupils bilaterally reactive to light. No icterus.  Oral cavity: Clear, no lesions  Neck: No neck vein distension noted  RS: Chest clear to auscultation bilaterally. B/l fine basal crepitations. No wheeze/rhonchi.  CVS: S1, S2 heard. Regular rate and rhythm. No murmurs/rubs/gallops.  Abdomen: Soft. No guarding/rigidity/tenderness.  : No acute abnormalities  Extremities: Warm. No pedal edema  Skin: LLE: Ulcer noted over TMA site. Minimal discharge, healthy base. No s/o inflammation  RLE: Ulcer noted over plantar aspect of great toe. No s/o inflammation  Vascular: No evidence of phlebitis  Neuro: Alert, oriented to time/place/person  Cranial nerves 2-12 grossly normal. No focal abnormalities    Labs:                        8.0    2.95  )-----------( 61       ( 03 Aug 2019 05:40 )             24.5       08-03    139  |  99  |  9   ----------------------------<  196<H>  3.5   |  30  |  1.13    Ca    8.6      03 Aug 2019 05:40  Phos  2.8     -  Mg     1.7     -    TPro  6.7  /  Alb  3.1<L>  /  TBili  0.6  /  DBili  x   /  AST  21  /  ALT  54<H>  /  AlkPhos  174<H>  -    Urinalysis Basic - ( 02 Aug 2019 11:15 )    Color: LIGHT YELLOW / Appearance: CLEAR / S.010 / pH: 6.5  Gluc: NEGATIVE / Ketone: NEGATIVE  / Bili: NEGATIVE / Urobili: NORMAL   Blood: TRACE / Protein: 20 / Nitrite: NEGATIVE   Leuk Esterase: NEGATIVE / RBC: 3-5 / WBC 0-2   Sq Epi: OCC / Non Sq Epi: x / Bacteria: NEGATIVE    MICROBIOLOGY:  Culture - Urine (collected 02 Aug 2019 11:15)  Source: URINE CATHETER  Final Report (03 Aug 2019 08:35):    NO GROWTH AT 24 HOURS    Culture - Blood (collected 2019 15:33)  Source: BLOOD VENOUS  Preliminary Report (02 Aug 2019 15:33):    NO ORGANISMS ISOLATED    NO ORGANISMS ISOLATED AT 48 HRS.    Culture - Blood (collected 2019 15:33)  Source: BLOOD PERIPHERAL  Preliminary Report (02 Aug 2019 15:34):    NO ORGANISMS ISOLATED    NO ORGANISMS ISOLATED AT 48 HRS.    Culture - Urine (collected 2019 15:24)  Source: URINE MIDSTREAM  Final Report (02 Aug 2019 08:56):    Culture grew 3 or more types of organisms which indicate    collection contamination; consider recollection only if    clinically indicated.      RADIOLOGY:  imaging below personally reviewed  < from: Xray Foot AP + Lateral + Oblique, Bilat (19 @ 18:41) >  Age-indeterminate slightly distracted obliquely oriented right hallux   distal phalangeal tuft fracture. No dislocations or additional fractures   on either side.    Left foot TMA defect noted. No proximally tracking gas collections or   radiopaque foreign bodies. No definite radiographic evidence for   osteomyelitis in either foot.    Tarsometatarsal alignment maintained without evidence for a Lisfranc   injury.    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 Preserved remaining joint spaces   and no joint margin erosions.    Bilateral posterosuperior calcaneal Lourdes deformities. Thick irregular   left plantar calcaneal enthesophytes. Small right calcaneal enthesophyte.    No discrete lytic or blastic lesions.    < end of copied text >      OTHER TESTS:

## 2019-08-04 LAB
ALBUMIN SERPL ELPH-MCNC: 2.7 G/DL — LOW (ref 3.3–5)
ALP SERPL-CCNC: 172 U/L — HIGH (ref 40–120)
ALT FLD-CCNC: 48 U/L — HIGH (ref 4–41)
ANION GAP SERPL CALC-SCNC: 12 MMO/L — SIGNIFICANT CHANGE UP (ref 7–14)
AST SERPL-CCNC: 25 U/L — SIGNIFICANT CHANGE UP (ref 4–40)
BASOPHILS # BLD AUTO: 0 K/UL — SIGNIFICANT CHANGE UP (ref 0–0.2)
BASOPHILS NFR BLD AUTO: 0 % — SIGNIFICANT CHANGE UP (ref 0–2)
BILIRUB SERPL-MCNC: 0.5 MG/DL — SIGNIFICANT CHANGE UP (ref 0.2–1.2)
BUN SERPL-MCNC: 8 MG/DL — SIGNIFICANT CHANGE UP (ref 7–23)
CALCIUM SERPL-MCNC: 8.3 MG/DL — LOW (ref 8.4–10.5)
CHLORIDE SERPL-SCNC: 97 MMOL/L — LOW (ref 98–107)
CO2 SERPL-SCNC: 28 MMOL/L — SIGNIFICANT CHANGE UP (ref 22–31)
CREAT SERPL-MCNC: 1.08 MG/DL — SIGNIFICANT CHANGE UP (ref 0.5–1.3)
EOSINOPHIL # BLD AUTO: 0.01 K/UL — SIGNIFICANT CHANGE UP (ref 0–0.5)
EOSINOPHIL NFR BLD AUTO: 0.3 % — SIGNIFICANT CHANGE UP (ref 0–6)
GLUCOSE BLDC GLUCOMTR-MCNC: 149 MG/DL — HIGH (ref 70–99)
GLUCOSE BLDC GLUCOMTR-MCNC: 191 MG/DL — HIGH (ref 70–99)
GLUCOSE BLDC GLUCOMTR-MCNC: 268 MG/DL — HIGH (ref 70–99)
GLUCOSE BLDC GLUCOMTR-MCNC: 274 MG/DL — HIGH (ref 70–99)
GLUCOSE SERPL-MCNC: 312 MG/DL — HIGH (ref 70–99)
HCT VFR BLD CALC: 23.4 % — LOW (ref 39–50)
HGB BLD-MCNC: 7.7 G/DL — LOW (ref 13–17)
IMM GRANULOCYTES NFR BLD AUTO: 0.6 % — SIGNIFICANT CHANGE UP (ref 0–1.5)
LYMPHOCYTES # BLD AUTO: 0.84 K/UL — LOW (ref 1–3.3)
LYMPHOCYTES # BLD AUTO: 24.3 % — SIGNIFICANT CHANGE UP (ref 13–44)
MAGNESIUM SERPL-MCNC: 1.6 MG/DL — SIGNIFICANT CHANGE UP (ref 1.6–2.6)
MANUAL SMEAR VERIFICATION: SIGNIFICANT CHANGE UP
MCHC RBC-ENTMCNC: 30.2 PG — SIGNIFICANT CHANGE UP (ref 27–34)
MCHC RBC-ENTMCNC: 32.9 % — SIGNIFICANT CHANGE UP (ref 32–36)
MCV RBC AUTO: 91.8 FL — SIGNIFICANT CHANGE UP (ref 80–100)
MONOCYTES # BLD AUTO: 0.18 K/UL — SIGNIFICANT CHANGE UP (ref 0–0.9)
MONOCYTES NFR BLD AUTO: 5.2 % — SIGNIFICANT CHANGE UP (ref 2–14)
NEUTROPHILS # BLD AUTO: 2.4 K/UL — SIGNIFICANT CHANGE UP (ref 1.8–7.4)
NEUTROPHILS NFR BLD AUTO: 69.6 % — SIGNIFICANT CHANGE UP (ref 43–77)
NRBC # FLD: 0.02 K/UL — SIGNIFICANT CHANGE UP (ref 0–0)
PHOSPHATE SERPL-MCNC: 2.6 MG/DL — SIGNIFICANT CHANGE UP (ref 2.5–4.5)
PLATELET # BLD AUTO: 50 K/UL — LOW (ref 150–400)
PMV BLD: 9.8 FL — SIGNIFICANT CHANGE UP (ref 7–13)
POTASSIUM SERPL-MCNC: 3.6 MMOL/L — SIGNIFICANT CHANGE UP (ref 3.5–5.3)
POTASSIUM SERPL-SCNC: 3.6 MMOL/L — SIGNIFICANT CHANGE UP (ref 3.5–5.3)
PROT SERPL-MCNC: 6.2 G/DL — SIGNIFICANT CHANGE UP (ref 6–8.3)
RBC # BLD: 2.55 M/UL — LOW (ref 4.2–5.8)
RBC # FLD: 13.3 % — SIGNIFICANT CHANGE UP (ref 10.3–14.5)
SODIUM SERPL-SCNC: 137 MMOL/L — SIGNIFICANT CHANGE UP (ref 135–145)
WBC # BLD: 3.45 K/UL — LOW (ref 3.8–10.5)
WBC # FLD AUTO: 3.45 K/UL — LOW (ref 3.8–10.5)

## 2019-08-04 PROCEDURE — 99233 SBSQ HOSP IP/OBS HIGH 50: CPT

## 2019-08-04 RX ORDER — INSULIN GLARGINE 100 [IU]/ML
30 INJECTION, SOLUTION SUBCUTANEOUS AT BEDTIME
Refills: 0 | Status: DISCONTINUED | OUTPATIENT
Start: 2019-08-04 | End: 2019-08-19

## 2019-08-04 RX ORDER — INSULIN LISPRO 100/ML
4 VIAL (ML) SUBCUTANEOUS
Refills: 0 | Status: DISCONTINUED | OUTPATIENT
Start: 2019-08-04 | End: 2019-08-19

## 2019-08-04 RX ADMIN — CHLORHEXIDINE GLUCONATE 1 APPLICATION(S): 213 SOLUTION TOPICAL at 12:20

## 2019-08-04 RX ADMIN — SIMVASTATIN 5 MILLIGRAM(S): 20 TABLET, FILM COATED ORAL at 22:14

## 2019-08-04 RX ADMIN — PIPERACILLIN AND TAZOBACTAM 25 GRAM(S): 4; .5 INJECTION, POWDER, LYOPHILIZED, FOR SOLUTION INTRAVENOUS at 05:20

## 2019-08-04 RX ADMIN — Medication 4 UNIT(S): at 17:13

## 2019-08-04 RX ADMIN — TAMSULOSIN HYDROCHLORIDE 0.4 MILLIGRAM(S): 0.4 CAPSULE ORAL at 22:13

## 2019-08-04 RX ADMIN — PIPERACILLIN AND TAZOBACTAM 25 GRAM(S): 4; .5 INJECTION, POWDER, LYOPHILIZED, FOR SOLUTION INTRAVENOUS at 13:32

## 2019-08-04 RX ADMIN — INSULIN GLARGINE 30 UNIT(S): 100 INJECTION, SOLUTION SUBCUTANEOUS at 22:14

## 2019-08-04 RX ADMIN — Medication 1: at 17:14

## 2019-08-04 RX ADMIN — PIPERACILLIN AND TAZOBACTAM 25 GRAM(S): 4; .5 INJECTION, POWDER, LYOPHILIZED, FOR SOLUTION INTRAVENOUS at 22:13

## 2019-08-04 RX ADMIN — Medication 3: at 07:46

## 2019-08-04 RX ADMIN — FAMOTIDINE 40 MILLIGRAM(S): 10 INJECTION INTRAVENOUS at 22:14

## 2019-08-04 RX ADMIN — Medication 4 UNIT(S): at 12:20

## 2019-08-04 RX ADMIN — Medication 3: at 12:20

## 2019-08-04 NOTE — PROGRESS NOTE ADULT - SUBJECTIVE AND OBJECTIVE BOX
Patient is a 63y old  Male who presents with a chief complaint of RF hallux wound, Left foot TMA with open wound (02 Aug 2019 15:07)      SUBJECTIVE / OVERNIGHT EVENTS: No complaints today.    MEDICATIONS  (STANDING):  chlorhexidine 4% Liquid 1 Application(s) Topical daily  dextrose 5%. 1000 milliLiter(s) (50 mL/Hr) IV Continuous <Continuous>  dextrose 50% Injectable 12.5 Gram(s) IV Push once  dextrose 50% Injectable 25 Gram(s) IV Push once  dextrose 50% Injectable 25 Gram(s) IV Push once  famotidine    Tablet 40 milliGRAM(s) Oral at bedtime  insulin glargine Injectable (LANTUS) 20 Unit(s) SubCutaneous at bedtime  insulin lispro (HumaLOG) corrective regimen sliding scale   SubCutaneous three times a day before meals  piperacillin/tazobactam IVPB.. 3.375 Gram(s) IV Intermittent every 8 hours  simvastatin 5 milliGRAM(s) Oral at bedtime  tamsulosin 0.4 milliGRAM(s) Oral at bedtime    MEDICATIONS  (PRN):  dextrose 40% Gel 15 Gram(s) Oral once PRN Blood Glucose LESS THAN 70 milliGRAM(s)/deciliter  glucagon  Injectable 1 milliGRAM(s) IntraMuscular once PRN Glucose LESS THAN 70 milligrams/deciliter      Vital Signs Last 24 Hrs  T(C): 36.7 (04 Aug 2019 05:20), Max: 37.1 (03 Aug 2019 12:23)  T(F): 98 (04 Aug 2019 05:20), Max: 98.8 (03 Aug 2019 12:23)  HR: 80 (04 Aug 2019 05:20) (75 - 81)  BP: 140/60 (04 Aug 2019 05:20) (140/60 - 159/64)  BP(mean): --  RR: 18 (04 Aug 2019 05:20) (18 - 18)  SpO2: 97% (04 Aug 2019 05:20) (96% - 97%)  CAPILLARY BLOOD GLUCOSE      POCT Blood Glucose.: 274 mg/dL (04 Aug 2019 07:36)  POCT Blood Glucose.: 301 mg/dL (03 Aug 2019 21:13)  POCT Blood Glucose.: 300 mg/dL (03 Aug 2019 16:48)  POCT Blood Glucose.: 278 mg/dL (03 Aug 2019 12:03)    I&O's Summary    03 Aug 2019 07:01  -  04 Aug 2019 07:00  --------------------------------------------------------  IN: 0 mL / OUT: 2925 mL / NET: -2925 mL        PHYSICAL EXAM:  GENERAL: NAD, well-developed  HEAD:  Atraumatic, Normocephalic  EYES: EOMI, PERRLA, conjunctiva and sclera clear  NECK: Supple, No JVD  CHEST/LUNG: Clear to auscultation bilaterally; No wheeze  HEART: Regular rate and rhythm; No murmurs, rubs, or gallops  ABDOMEN: Soft, Nontender, Nondistended; Bowel sounds present  EXTREMITIES:  (+) dressing at b/l feet  PSYCH: AAOx3  NEUROLOGY: non-focal  SKIN: No rashes or lesions    LABS:                        7.7    3.45  )-----------( 50       ( 04 Aug 2019 05:35 )             23.4     08-04    137  |  97<L>  |  8   ----------------------------<  312<H>  3.6   |  28  |  1.08    Ca    8.3<L>      04 Aug 2019 05:35  Phos  2.6     08-04  Mg     1.6     08-04    TPro  6.2  /  Alb  2.7<L>  /  TBili  0.5  /  DBili  x   /  AST  25  /  ALT  48<H>  /  AlkPhos  172<H>  08-04          Urinalysis Basic - ( 02 Aug 2019 11:15 )    Color: LIGHT YELLOW / Appearance: CLEAR / S.010 / pH: 6.5  Gluc: NEGATIVE / Ketone: NEGATIVE  / Bili: NEGATIVE / Urobili: NORMAL   Blood: TRACE / Protein: 20 / Nitrite: NEGATIVE   Leuk Esterase: NEGATIVE / RBC: 3-5 / WBC 0-2   Sq Epi: OCC / Non Sq Epi: x / Bacteria: NEGATIVE        RADIOLOGY & ADDITIONAL TESTS:    Imaging Personally Reviewed:    Consultant(s) Notes Reviewed:  Podiatry    Care Discussed with Consultants/Other Providers:

## 2019-08-04 NOTE — PROGRESS NOTE ADULT - PROBLEM SELECTOR PLAN 10
DVT PPx: ICD  Diet: DASH/TLC, Diabetic  Dispo: PT consulted  Code status: Full code  Daughter Lin is [phone]

## 2019-08-04 NOTE — PROGRESS NOTE ADULT - PROBLEM SELECTOR PLAN 5
F/U A1c  - Increase Lantus to 30 units, ( home is 50 unit, tailor according to blood sugars  - continue ISS, FS QID  - diabetic diet F/U A1c  - Increase Lantus to 30 units, ( home is 50 unit, tailor according to blood sugars  -Add pre-meal 4units humalog 3X/day  - continue ISS, FS QID  - diabetic diet

## 2019-08-05 ENCOUNTER — APPOINTMENT (OUTPATIENT)
Dept: NEPHROLOGY | Facility: CLINIC | Age: 64
End: 2019-08-05

## 2019-08-05 ENCOUNTER — APPOINTMENT (OUTPATIENT)
Dept: INFUSION THERAPY | Facility: HOSPITAL | Age: 64
End: 2019-08-05

## 2019-08-05 LAB
ALBUMIN SERPL ELPH-MCNC: 2.9 G/DL — LOW (ref 3.3–5)
ALP SERPL-CCNC: 179 U/L — HIGH (ref 40–120)
ALT FLD-CCNC: 52 U/L — HIGH (ref 4–41)
ANION GAP SERPL CALC-SCNC: 11 MMO/L — SIGNIFICANT CHANGE UP (ref 7–14)
AST SERPL-CCNC: 28 U/L — SIGNIFICANT CHANGE UP (ref 4–40)
BACTERIA BLD CULT: SIGNIFICANT CHANGE UP
BACTERIA BLD CULT: SIGNIFICANT CHANGE UP
BASOPHILS # BLD AUTO: 0 K/UL — SIGNIFICANT CHANGE UP (ref 0–0.2)
BASOPHILS NFR BLD AUTO: 0 % — SIGNIFICANT CHANGE UP (ref 0–2)
BILIRUB SERPL-MCNC: 0.4 MG/DL — SIGNIFICANT CHANGE UP (ref 0.2–1.2)
BUN SERPL-MCNC: 9 MG/DL — SIGNIFICANT CHANGE UP (ref 7–23)
CALCIUM SERPL-MCNC: 9.3 MG/DL — SIGNIFICANT CHANGE UP (ref 8.4–10.5)
CHLORIDE SERPL-SCNC: 98 MMOL/L — SIGNIFICANT CHANGE UP (ref 98–107)
CO2 SERPL-SCNC: 28 MMOL/L — SIGNIFICANT CHANGE UP (ref 22–31)
CREAT SERPL-MCNC: 1.15 MG/DL — SIGNIFICANT CHANGE UP (ref 0.5–1.3)
EOSINOPHIL # BLD AUTO: 0.04 K/UL — SIGNIFICANT CHANGE UP (ref 0–0.5)
EOSINOPHIL NFR BLD AUTO: 1.2 % — SIGNIFICANT CHANGE UP (ref 0–6)
GLUCOSE BLDC GLUCOMTR-MCNC: 199 MG/DL — HIGH (ref 70–99)
GLUCOSE BLDC GLUCOMTR-MCNC: 227 MG/DL — HIGH (ref 70–99)
GLUCOSE BLDC GLUCOMTR-MCNC: 228 MG/DL — HIGH (ref 70–99)
GLUCOSE SERPL-MCNC: 206 MG/DL — HIGH (ref 70–99)
HCT VFR BLD CALC: 24.4 % — LOW (ref 39–50)
HGB BLD-MCNC: 8 G/DL — LOW (ref 13–17)
IMM GRANULOCYTES NFR BLD AUTO: 0.3 % — SIGNIFICANT CHANGE UP (ref 0–1.5)
LYMPHOCYTES # BLD AUTO: 0.92 K/UL — LOW (ref 1–3.3)
LYMPHOCYTES # BLD AUTO: 28.7 % — SIGNIFICANT CHANGE UP (ref 13–44)
MAGNESIUM SERPL-MCNC: 1.7 MG/DL — SIGNIFICANT CHANGE UP (ref 1.6–2.6)
MCHC RBC-ENTMCNC: 30.3 PG — SIGNIFICANT CHANGE UP (ref 27–34)
MCHC RBC-ENTMCNC: 32.8 % — SIGNIFICANT CHANGE UP (ref 32–36)
MCV RBC AUTO: 92.4 FL — SIGNIFICANT CHANGE UP (ref 80–100)
MONOCYTES # BLD AUTO: 0.32 K/UL — SIGNIFICANT CHANGE UP (ref 0–0.9)
MONOCYTES NFR BLD AUTO: 10 % — SIGNIFICANT CHANGE UP (ref 2–14)
NEUTROPHILS # BLD AUTO: 1.92 K/UL — SIGNIFICANT CHANGE UP (ref 1.8–7.4)
NEUTROPHILS NFR BLD AUTO: 59.8 % — SIGNIFICANT CHANGE UP (ref 43–77)
NRBC # FLD: 0.03 K/UL — SIGNIFICANT CHANGE UP (ref 0–0)
PHOSPHATE SERPL-MCNC: 3 MG/DL — SIGNIFICANT CHANGE UP (ref 2.5–4.5)
PLATELET # BLD AUTO: 44 K/UL — LOW (ref 150–400)
PMV BLD: 10.2 FL — SIGNIFICANT CHANGE UP (ref 7–13)
POTASSIUM SERPL-MCNC: 3.6 MMOL/L — SIGNIFICANT CHANGE UP (ref 3.5–5.3)
POTASSIUM SERPL-SCNC: 3.6 MMOL/L — SIGNIFICANT CHANGE UP (ref 3.5–5.3)
PROT SERPL-MCNC: 6.6 G/DL — SIGNIFICANT CHANGE UP (ref 6–8.3)
RBC # BLD: 2.64 M/UL — LOW (ref 4.2–5.8)
RBC # FLD: 13.3 % — SIGNIFICANT CHANGE UP (ref 10.3–14.5)
SODIUM SERPL-SCNC: 137 MMOL/L — SIGNIFICANT CHANGE UP (ref 135–145)
WBC # BLD: 3.21 K/UL — LOW (ref 3.8–10.5)
WBC # FLD AUTO: 3.21 K/UL — LOW (ref 3.8–10.5)

## 2019-08-05 PROCEDURE — 99232 SBSQ HOSP IP/OBS MODERATE 35: CPT

## 2019-08-05 PROCEDURE — 99233 SBSQ HOSP IP/OBS HIGH 50: CPT

## 2019-08-05 PROCEDURE — 73719 MRI LOWER EXTREMITY W/DYE: CPT | Mod: 26,LT

## 2019-08-05 RX ORDER — DOCUSATE SODIUM 100 MG
100 CAPSULE ORAL DAILY
Refills: 0 | Status: DISCONTINUED | OUTPATIENT
Start: 2019-08-05 | End: 2019-08-06

## 2019-08-05 RX ADMIN — Medication 4 UNIT(S): at 17:35

## 2019-08-05 RX ADMIN — Medication 2: at 17:35

## 2019-08-05 RX ADMIN — CHLORHEXIDINE GLUCONATE 1 APPLICATION(S): 213 SOLUTION TOPICAL at 12:17

## 2019-08-05 RX ADMIN — Medication 2: at 12:17

## 2019-08-05 RX ADMIN — Medication 4 UNIT(S): at 12:17

## 2019-08-05 RX ADMIN — INSULIN GLARGINE 30 UNIT(S): 100 INJECTION, SOLUTION SUBCUTANEOUS at 22:11

## 2019-08-05 RX ADMIN — TAMSULOSIN HYDROCHLORIDE 0.4 MILLIGRAM(S): 0.4 CAPSULE ORAL at 21:59

## 2019-08-05 RX ADMIN — FAMOTIDINE 40 MILLIGRAM(S): 10 INJECTION INTRAVENOUS at 21:59

## 2019-08-05 RX ADMIN — PIPERACILLIN AND TAZOBACTAM 25 GRAM(S): 4; .5 INJECTION, POWDER, LYOPHILIZED, FOR SOLUTION INTRAVENOUS at 05:55

## 2019-08-05 RX ADMIN — SIMVASTATIN 5 MILLIGRAM(S): 20 TABLET, FILM COATED ORAL at 21:59

## 2019-08-05 RX ADMIN — Medication 100 MILLIGRAM(S): at 14:14

## 2019-08-05 RX ADMIN — PIPERACILLIN AND TAZOBACTAM 25 GRAM(S): 4; .5 INJECTION, POWDER, LYOPHILIZED, FOR SOLUTION INTRAVENOUS at 21:59

## 2019-08-05 RX ADMIN — PIPERACILLIN AND TAZOBACTAM 25 GRAM(S): 4; .5 INJECTION, POWDER, LYOPHILIZED, FOR SOLUTION INTRAVENOUS at 14:14

## 2019-08-05 RX ADMIN — Medication 1: at 08:30

## 2019-08-05 RX ADMIN — Medication 4 UNIT(S): at 08:30

## 2019-08-05 NOTE — PROGRESS NOTE ADULT - SUBJECTIVE AND OBJECTIVE BOX
Patient is a 63y old  Male who presents with a chief complaint of RF hallux wound, Left foot TMA with open wound (02 Aug 2019 15:07)       INTERVAL HPI/OVERNIGHT EVENTS:  Patient seen and evaluated at bedside.  Pt is resting comfortable in NAD. Denies N/V/F/C.  Pain rated at X/10    Allergies    No Known Allergies    Intolerances        Vital Signs Last 24 Hrs  T(C): 36.6 (05 Aug 2019 05:50), Max: 36.9 (04 Aug 2019 12:36)  T(F): 97.8 (05 Aug 2019 05:50), Max: 98.4 (04 Aug 2019 12:36)  HR: 77 (05 Aug 2019 05:50) (72 - 77)  BP: 168/68 (05 Aug 2019 05:50) (139/67 - 168/68)  BP(mean): --  RR: 18 (05 Aug 2019 05:50) (18 - 18)  SpO2: 100% (05 Aug 2019 05:50) (99% - 100%)    LABS:                        7.7    3.45  )-----------( 50       ( 04 Aug 2019 05:35 )             23.4     08-04    137  |  97<L>  |  8   ----------------------------<  312<H>  3.6   |  28  |  1.08    Ca    8.3<L>      04 Aug 2019 05:35  Phos  2.6     08-04  Mg     1.6     08-04    TPro  6.2  /  Alb  2.7<L>  /  TBili  0.5  /  DBili  x   /  AST  25  /  ALT  48<H>  /  AlkPhos  172<H>  08-04        CAPILLARY BLOOD GLUCOSE      POCT Blood Glucose.: 149 mg/dL (04 Aug 2019 21:51)  POCT Blood Glucose.: 191 mg/dL (04 Aug 2019 16:51)  POCT Blood Glucose.: 268 mg/dL (04 Aug 2019 12:08)  POCT Blood Glucose.: 274 mg/dL (04 Aug 2019 07:36)      Lower Extremity Physical Exam:  Vascular: DP/PT 2/4 B/L, CFT <3 seconds R, Temperature gradient warm to cool B/L  Musculoskeletal/Ortho: TMA of left foot  Skin:  Wound #1:   Location: midfoot left foot centrally  Size: 2cm x 4 cm  Depth: probes down to subQ, no tracking or undermining   Wound bed: granular  Drainage: none  Odor: none  Periwound: fibrous    Wound #2:   Location: right foot plantar distal hallux wound  Size: 2mm x 2 mm  Depth: probes down to subQ, no tracking or undermining   Wound bed: granular  Drainage: 1 cc of pus  Odor: mildly malodorous  Periwound: fibrous    RADIOLOGY & ADDITIONAL TESTS:

## 2019-08-05 NOTE — PROGRESS NOTE ADULT - PROBLEM SELECTOR PLAN 5
Likely myelosuppression from Gemzar, recently given on Monday  - continue to monitor CBC  - Holding AC as plts < 50

## 2019-08-05 NOTE — PROGRESS NOTE ADULT - ASSESSMENT
62 yo M pt w/ b/l foot wounds   - pt seen and evaluated  - right foot plantar hallux wound probes to subQ w/ no purulence expressed, wound does not track, demonstrates no undermining, and probes to subQ only, no malodor was noted  - Left foot- s/p TMA w/ central wound w/ a granular base that probes to subcutaneous tissue- wound does not track, no undermining, no malodor noted, and no drainage expressed  - X-ray results reviewed and discussed with pt demonstrated acute fracture of right foot distal phalanx of hallux, left foot demonstrates no changes associated with osteomyelitis and is little concern as wound is stable.   - Low concern for osteomyelitis of b/l lower extremity wounds with no clinical signs of infection.  - Pod believes elevated ESR is attributable with combination of acute fracture in conjunction with patient's small cell lung cancer  - Ordered L foot MRI   - If results of MRI are questionable, will perform L foot Bone biopsy   - pod will continue monitor  - seen w/ attending

## 2019-08-05 NOTE — PROGRESS NOTE ADULT - SUBJECTIVE AND OBJECTIVE BOX
CC: F/U for Wound Infection    Saw/spoke to patient. No fevers, no chills. No new complaints.    Allergies  No Known Allergies    ANTIMICROBIALS:  piperacillin/tazobactam IVPB.. 3.375 every 8 hours    PE:    Vital Signs Last 24 Hrs  T(C): 37.3 (05 Aug 2019 12:39), Max: 37.3 (05 Aug 2019 12:39)  T(F): 99.1 (05 Aug 2019 12:39), Max: 99.1 (05 Aug 2019 12:39)  HR: 81 (05 Aug 2019 12:39) (72 - 81)  BP: 116/65 (05 Aug 2019 12:39) (116/65 - 168/68)  RR: 18 (05 Aug 2019 12:39) (18 - 18)  SpO2: 98% (05 Aug 2019 12:39) (98% - 100%)    Gen: AOx3, NAD, non-toxic, pleasant  CV: S1+S2 normal, nontachycardic  Resp: Clear bilat, no resp distress, no crackles/wheezes  Abd: Soft, nontender, +BS  Ext: No LE edema, no wounds    LABS:                        8.0    3.21  )-----------( 44       ( 05 Aug 2019 07:05 )             24.4     08-05    137  |  98  |  9   ----------------------------<  206<H>  3.6   |  28  |  1.15    Ca    9.3      05 Aug 2019 07:05  Phos  3.0     08-05  Mg     1.7     08-05    TPro  6.6  /  Alb  2.9<L>  /  TBili  0.4  /  DBili  x   /  AST  28  /  ALT  52<H>  /  AlkPhos  179<H>  08-05    MICROBIOLOGY:    URINE CATHETER  08-02-19 NGTD    BLOOD PERIPHERAL  07-31-19 NGTD    (otherwise reviewed)    RADIOLOGY:    8/1 XR:    IMPRESSION:  Age-indeterminate slightly distracted obliquely oriented right hallux   distal phalangeal tuft fracture. No dislocations or additional fractures   on either side.    Left foot TMA defect noted. No proximally tracking gas collections or   radiopaque foreign bodies. No definite radiographic evidence for   osteomyelitis in either foot.    Tarsometatarsal alignment maintained without evidence for a Lisfranc   injury.    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 Preserved remaining joint spaces   and no joint margin erosions.    Bilateral posterosuperior calcaneal Lourdes deformities. Thick irregular   left plantar calcaneal enthesophytes. Small right calcaneal enthesophyte.    No discrete lytic or blastic lesions.

## 2019-08-05 NOTE — PROGRESS NOTE ADULT - PROBLEM SELECTOR PLAN 1
Leukocytosis, fever at home of 102.7, HR 95  Unclear source, negative UA, negative CXR, only complaining of urinary incontinence, dry cough, ddx also includes tumor fever from increased burden as shown by POD. (+) b/l foot ulcers, r/o OM  - continue IV zosyn, ID recs appreciated   - D/C IVF  - Blood cxs negative to date; urine cx w/ >3 organisms likely contaminant    - monitor CBC, vitals

## 2019-08-05 NOTE — PROGRESS NOTE ADULT - ASSESSMENT
62 yo M with PMH extensive small cell lung cancer, metastatic to bone, and brain on Gemcitabine (last dose Mon 7/29/19), HTN, HLD, DM, CKD. P/w fever, chills, urinary incontinence and cough with white sputum.  Fever, leukocytosis  R foot toe wound; L foot TMA wound  Per note, patient has purulence expressed from Hallux wound prior  Both wounds appear chronic/longstanding, with minimal erythema or discharge  Would give course treatment for SSTI  BCX NGTD, UCX neg  Patient well appearing, fever, leukocytosis resolved  Overall, fever, wound infection, elevated ESR  - Zosyn 3.375g q 8  - Consider vanco if signs worsening  - F/U podiatry  - Consider MRI to LE; consider bone biopsy if MR not feasible    Elmer Child MD  Pager [phone]  After 5pm and on weekends call [phone]

## 2019-08-05 NOTE — PROGRESS NOTE ADULT - SUBJECTIVE AND OBJECTIVE BOX
Patient is a 63y old  Male who presents with a chief complaint of RF hallux wound, Left foot TMA with open wound (02 Aug 2019 15:07)      Subjective: Spoke to patient via daughter translating over phone per patient request. Patient feels good today. He denies chest pain, fevers, SOB. Daughter requesting walker at home.     MEDICATIONS  (STANDING):  chlorhexidine 4% Liquid 1 Application(s) Topical daily  dextrose 5%. 1000 milliLiter(s) (50 mL/Hr) IV Continuous <Continuous>  dextrose 50% Injectable 12.5 Gram(s) IV Push once  dextrose 50% Injectable 25 Gram(s) IV Push once  dextrose 50% Injectable 25 Gram(s) IV Push once  famotidine    Tablet 40 milliGRAM(s) Oral at bedtime  insulin glargine Injectable (LANTUS) 30 Unit(s) SubCutaneous at bedtime  insulin lispro (HumaLOG) corrective regimen sliding scale   SubCutaneous three times a day before meals  insulin lispro Injectable (HumaLOG) 4 Unit(s) SubCutaneous three times a day before meals  piperacillin/tazobactam IVPB.. 3.375 Gram(s) IV Intermittent every 8 hours  simvastatin 5 milliGRAM(s) Oral at bedtime  tamsulosin 0.4 milliGRAM(s) Oral at bedtime    MEDICATIONS  (PRN):  dextrose 40% Gel 15 Gram(s) Oral once PRN Blood Glucose LESS THAN 70 milliGRAM(s)/deciliter  glucagon  Injectable 1 milliGRAM(s) IntraMuscular once PRN Glucose LESS THAN 70 milligrams/deciliter        Objective:    Vitals: Vital Signs Last 24 Hrs  T(C): 36.6 (08-05-19 @ 05:50), Max: 36.9 (08-04-19 @ 12:36)  T(F): 97.8 (08-05-19 @ 05:50), Max: 98.4 (08-04-19 @ 12:36)  HR: 77 (08-05-19 @ 05:50) (72 - 77)  BP: 168/68 (08-05-19 @ 05:50) (139/67 - 168/68)  BP(mean): --  RR: 18 (08-05-19 @ 05:50) (18 - 18)  SpO2: 100% (08-05-19 @ 05:50) (99% - 100%)            I&O's Summary    04 Aug 2019 07:01  -  05 Aug 2019 07:00  --------------------------------------------------------  IN: 100 mL / OUT: 1950 mL / NET: -1850 mL        PHYSICAL EXAM:  GENERAL: NAD, well-groomed, well-developed  HEAD:  Atraumatic, Normocephalic  CHEST/LUNG: Clear to percussion bilaterally; No rales, rhonchi, wheezing, or rubs  HEART: Regular rate and rhythm; No murmurs, rubs, or gallops  ABDOMEN: Soft, Nontender, Nondistended; Bowel sounds present  EXTREMITIES:  L foot s/p TMA dressed, R foot dressed   NERVOUS SYSTEM:  Alert & Oriented X3, Good concentration    LABS:  08-05    137  |  98  |  9   ----------------------------<  206<H>  3.6   |  28  |  1.15  08-04    137  |  97<L>  |  8   ----------------------------<  312<H>  3.6   |  28  |  1.08  08-03    139  |  99  |  9   ----------------------------<  196<H>  3.5   |  30  |  1.13    Ca    9.3      05 Aug 2019 07:05  Ca    8.3<L>      04 Aug 2019 05:35  Ca    8.6      03 Aug 2019 05:40  Phos  3.0     08-05  Mg     1.7     08-05    TPro  6.6  /  Alb  2.9<L>  /  TBili  0.4  /  DBili  x   /  AST  28  /  ALT  52<H>  /  AlkPhos  179<H>  08-05  TPro  6.2  /  Alb  2.7<L>  /  TBili  0.5  /  DBili  x   /  AST  25  /  ALT  48<H>  /  AlkPhos  172<H>  08-04  TPro  6.7  /  Alb  3.1<L>  /  TBili  0.6  /  DBili  x   /  AST  21  /  ALT  54<H>  /  AlkPhos  174<H>  08-03                                              8.0    3.21  )-----------( 44       ( 05 Aug 2019 07:05 )             24.4                         7.7    3.45  )-----------( 50       ( 04 Aug 2019 05:35 )             23.4                         8.0    2.95  )-----------( 61       ( 03 Aug 2019 05:40 )             24.5     CAPILLARY BLOOD GLUCOSE      POCT Blood Glucose.: 199 mg/dL (05 Aug 2019 07:42)  POCT Blood Glucose.: 149 mg/dL (04 Aug 2019 21:51)  POCT Blood Glucose.: 191 mg/dL (04 Aug 2019 16:51)  POCT Blood Glucose.: 268 mg/dL (04 Aug 2019 12:08)      RADIOLOGY & ADDITIONAL TESTS:    Imaging Personally Reviewed:  [X ] YES  [ ] NO      Consultants involved in case:   Consultant(s) Notes Reviewed:  [X ] YES  [ ] NO:   Care Discussed with Consultants/Other Providers [ ] YES  [ ] NO

## 2019-08-05 NOTE — PROGRESS NOTE ADULT - ASSESSMENT
63M with PMHx of extensive small cell lung cancer, metastatic to bone, and brain followed by Dr. Thao who presents with fever, chills, urinary incontinence and a dry cough, last chemotherapy was on Monday admitted for management of suspected sepsis.

## 2019-08-06 DIAGNOSIS — M86.9 OSTEOMYELITIS, UNSPECIFIED: ICD-10-CM

## 2019-08-06 LAB
ALBUMIN SERPL ELPH-MCNC: 3 G/DL — LOW (ref 3.3–5)
ALP SERPL-CCNC: 176 U/L — HIGH (ref 40–120)
ALT FLD-CCNC: 44 U/L — HIGH (ref 4–41)
ANION GAP SERPL CALC-SCNC: 10 MMO/L — SIGNIFICANT CHANGE UP (ref 7–14)
AST SERPL-CCNC: 23 U/L — SIGNIFICANT CHANGE UP (ref 4–40)
BILIRUB SERPL-MCNC: 0.4 MG/DL — SIGNIFICANT CHANGE UP (ref 0.2–1.2)
BUN SERPL-MCNC: 10 MG/DL — SIGNIFICANT CHANGE UP (ref 7–23)
CALCIUM SERPL-MCNC: 8.9 MG/DL — SIGNIFICANT CHANGE UP (ref 8.4–10.5)
CHLORIDE SERPL-SCNC: 100 MMOL/L — SIGNIFICANT CHANGE UP (ref 98–107)
CO2 SERPL-SCNC: 28 MMOL/L — SIGNIFICANT CHANGE UP (ref 22–31)
CREAT SERPL-MCNC: 1.2 MG/DL — SIGNIFICANT CHANGE UP (ref 0.5–1.3)
GLUCOSE BLDC GLUCOMTR-MCNC: 118 MG/DL — HIGH (ref 70–99)
GLUCOSE BLDC GLUCOMTR-MCNC: 144 MG/DL — HIGH (ref 70–99)
GLUCOSE BLDC GLUCOMTR-MCNC: 144 MG/DL — HIGH (ref 70–99)
GLUCOSE BLDC GLUCOMTR-MCNC: 215 MG/DL — HIGH (ref 70–99)
GLUCOSE BLDC GLUCOMTR-MCNC: 234 MG/DL — HIGH (ref 70–99)
GLUCOSE SERPL-MCNC: 141 MG/DL — HIGH (ref 70–99)
HCT VFR BLD CALC: 24.5 % — LOW (ref 39–50)
HGB BLD-MCNC: 8.1 G/DL — LOW (ref 13–17)
MAGNESIUM SERPL-MCNC: 1.8 MG/DL — SIGNIFICANT CHANGE UP (ref 1.6–2.6)
MCHC RBC-ENTMCNC: 30.6 PG — SIGNIFICANT CHANGE UP (ref 27–34)
MCHC RBC-ENTMCNC: 33.1 % — SIGNIFICANT CHANGE UP (ref 32–36)
MCV RBC AUTO: 92.5 FL — SIGNIFICANT CHANGE UP (ref 80–100)
NRBC # FLD: 0 K/UL — SIGNIFICANT CHANGE UP (ref 0–0)
PHOSPHATE SERPL-MCNC: 3 MG/DL — SIGNIFICANT CHANGE UP (ref 2.5–4.5)
PLATELET # BLD AUTO: 41 K/UL — LOW (ref 150–400)
PMV BLD: 10.9 FL — SIGNIFICANT CHANGE UP (ref 7–13)
POTASSIUM SERPL-MCNC: 3.7 MMOL/L — SIGNIFICANT CHANGE UP (ref 3.5–5.3)
POTASSIUM SERPL-SCNC: 3.7 MMOL/L — SIGNIFICANT CHANGE UP (ref 3.5–5.3)
PROT SERPL-MCNC: 6.9 G/DL — SIGNIFICANT CHANGE UP (ref 6–8.3)
RBC # BLD: 2.65 M/UL — LOW (ref 4.2–5.8)
RBC # FLD: 13.4 % — SIGNIFICANT CHANGE UP (ref 10.3–14.5)
SODIUM SERPL-SCNC: 138 MMOL/L — SIGNIFICANT CHANGE UP (ref 135–145)
SPECIMEN SOURCE: SIGNIFICANT CHANGE UP
WBC # BLD: 3.62 K/UL — LOW (ref 3.8–10.5)
WBC # FLD AUTO: 3.62 K/UL — LOW (ref 3.8–10.5)

## 2019-08-06 PROCEDURE — 99233 SBSQ HOSP IP/OBS HIGH 50: CPT

## 2019-08-06 RX ORDER — SENNA PLUS 8.6 MG/1
2 TABLET ORAL AT BEDTIME
Refills: 0 | Status: DISCONTINUED | OUTPATIENT
Start: 2019-08-06 | End: 2019-08-19

## 2019-08-06 RX ORDER — DOCUSATE SODIUM 100 MG
100 CAPSULE ORAL THREE TIMES A DAY
Refills: 0 | Status: DISCONTINUED | OUTPATIENT
Start: 2019-08-06 | End: 2019-08-19

## 2019-08-06 RX ORDER — POLYETHYLENE GLYCOL 3350 17 G/17G
17 POWDER, FOR SOLUTION ORAL DAILY
Refills: 0 | Status: DISCONTINUED | OUTPATIENT
Start: 2019-08-06 | End: 2019-08-19

## 2019-08-06 RX ADMIN — Medication 4 UNIT(S): at 12:30

## 2019-08-06 RX ADMIN — Medication 2: at 17:16

## 2019-08-06 RX ADMIN — INSULIN GLARGINE 30 UNIT(S): 100 INJECTION, SOLUTION SUBCUTANEOUS at 21:20

## 2019-08-06 RX ADMIN — POLYETHYLENE GLYCOL 3350 17 GRAM(S): 17 POWDER, FOR SOLUTION ORAL at 12:30

## 2019-08-06 RX ADMIN — CHLORHEXIDINE GLUCONATE 1 APPLICATION(S): 213 SOLUTION TOPICAL at 12:30

## 2019-08-06 RX ADMIN — Medication 4 UNIT(S): at 08:17

## 2019-08-06 RX ADMIN — PIPERACILLIN AND TAZOBACTAM 25 GRAM(S): 4; .5 INJECTION, POWDER, LYOPHILIZED, FOR SOLUTION INTRAVENOUS at 14:13

## 2019-08-06 RX ADMIN — Medication 100 MILLIGRAM(S): at 14:13

## 2019-08-06 RX ADMIN — PIPERACILLIN AND TAZOBACTAM 25 GRAM(S): 4; .5 INJECTION, POWDER, LYOPHILIZED, FOR SOLUTION INTRAVENOUS at 05:32

## 2019-08-06 RX ADMIN — SIMVASTATIN 5 MILLIGRAM(S): 20 TABLET, FILM COATED ORAL at 21:19

## 2019-08-06 RX ADMIN — FAMOTIDINE 40 MILLIGRAM(S): 10 INJECTION INTRAVENOUS at 21:19

## 2019-08-06 RX ADMIN — Medication 4 UNIT(S): at 17:16

## 2019-08-06 RX ADMIN — SENNA PLUS 2 TABLET(S): 8.6 TABLET ORAL at 23:21

## 2019-08-06 RX ADMIN — PIPERACILLIN AND TAZOBACTAM 25 GRAM(S): 4; .5 INJECTION, POWDER, LYOPHILIZED, FOR SOLUTION INTRAVENOUS at 23:20

## 2019-08-06 RX ADMIN — Medication 100 MILLIGRAM(S): at 21:19

## 2019-08-06 RX ADMIN — TAMSULOSIN HYDROCHLORIDE 0.4 MILLIGRAM(S): 0.4 CAPSULE ORAL at 21:20

## 2019-08-06 NOTE — PROGRESS NOTE ADULT - ASSESSMENT
62 yo M with PMH extensive small cell lung cancer, metastatic to bone, and brain on Gemcitabine (last dose Mon 7/29/19), HTN, HLD, DM, CKD. P/w fever, chills, urinary incontinence and cough with white sputum.  Fever, leukocytosis  R foot toe wound; L foot TMA wound  Per note, patient has purulence expressed from Hallux wound prior  Both wounds appear chronic/longstanding, with minimal erythema or discharge  BCX NGTD, UCX neg  Now MR with OM at 4th/5th metatarsal margins  Overall, fever, wound infection, elevated ESR, OM  - Zosyn 3.375g q 8  - Consider vanco if signs worsening  - F/U podiatry, possible plan for TMA revision for OM resection (agree with plan for amp as is surgically feasible)  - Discussed case with podiatry resident    Elmer Child MD  Pager [phone]  After 5pm and on weekends call [phone]

## 2019-08-06 NOTE — PROGRESS NOTE ADULT - SUBJECTIVE AND OBJECTIVE BOX
CC: F/U for OM    Saw/spoke to patient. No fevers, no chills. No new complaints.    Allergies  No Known Allergies    ANTIMICROBIALS:  piperacillin/tazobactam IVPB.. 3.375 every 8 hours    PE:    Vital Signs Last 24 Hrs  T(C): 36.9 (06 Aug 2019 12:36), Max: 37 (05 Aug 2019 22:09)  T(F): 98.4 (06 Aug 2019 12:36), Max: 98.6 (05 Aug 2019 22:09)  HR: 72 (06 Aug 2019 12:36) (72 - 79)  BP: 150/84 (06 Aug 2019 12:36) (149/86 - 168/68)  RR: 18 (06 Aug 2019 12:36) (18 - 18)  SpO2: 98% (06 Aug 2019 12:36) (98% - 99%)    Gen: AOx3, NAD, non-toxic, pleasant  CV: S1+S2 normal, nontachycardic  Resp: Clear bilat, no resp distress, no crackles/wheezes  Abd: Soft, nontender, +BS  Ext: RLE 1st toe wound; LLE TMA ulcer    LABS:                        8.1    3.62  )-----------( 41       ( 06 Aug 2019 07:01 )             24.5     08-06    138  |  100  |  10  ----------------------------<  141<H>  3.7   |  28  |  1.20    Ca    8.9      06 Aug 2019 07:01  Phos  3.0     08-06  Mg     1.8     08-06    TPro  6.9  /  Alb  3.0<L>  /  TBili  0.4  /  DBili  x   /  AST  23  /  ALT  44<H>  /  AlkPhos  176<H>  08-06    MICROBIOLOGY:    BLOOD  08-05-19 NGTD    URINE CATHETER  08-02-19 NGTD    (otherwise reviewed)    RADIOLOGY:    LLE MR:    IMPRESSION:  Soft tissue ulceration over lateral plantar aspect of the TMA stump with   evidence for osteomyelitis involving the underlying 4th and 5th   metatarsal margins. The 4th and 5th metatarsal stumps are also noted to   be longer relative to the remaining metatarsal stump remnants suggesting   an aggressive edge.    Less specific marrow signal alteration in the 1st and 3rd metatarsal   stump remnant with grossly intact appearing overlying soft tissue   coverage. This may be related to ischemic change or reactive osteitis   however early or low-grade osteomyelitis in these regions cannot be   entirely excluded.    No discrete drainable abscess collections.    Lobular marginated subcortical marrow signal alteration in plantar aspect   of the calcaneus adjacent to thickened heterogeneous plantar fascia and   with intact overlying soft tissue coverage may reflect chronic   enthesopathic change.    Mild circumferential fluid distention of the flexor tendon sheaths may   reflect mild degree of tenosynovitis.

## 2019-08-06 NOTE — PROGRESS NOTE ADULT - SUBJECTIVE AND OBJECTIVE BOX
Patient is a 63y old  Male who presents with a chief complaint of fever (05 Aug 2019 11:26)      Subjective: [facility]  #[id number]. Patient feels well this AM. Denies chest pain, SOB, fevers. I informed patient of MRI findings via  phone.     MEDICATIONS  (STANDING):  chlorhexidine 4% Liquid 1 Application(s) Topical daily  dextrose 5%. 1000 milliLiter(s) (50 mL/Hr) IV Continuous <Continuous>  dextrose 50% Injectable 12.5 Gram(s) IV Push once  dextrose 50% Injectable 25 Gram(s) IV Push once  dextrose 50% Injectable 25 Gram(s) IV Push once  docusate sodium 100 milliGRAM(s) Oral three times a day  famotidine    Tablet 40 milliGRAM(s) Oral at bedtime  insulin glargine Injectable (LANTUS) 30 Unit(s) SubCutaneous at bedtime  insulin lispro (HumaLOG) corrective regimen sliding scale   SubCutaneous three times a day before meals  insulin lispro Injectable (HumaLOG) 4 Unit(s) SubCutaneous three times a day before meals  piperacillin/tazobactam IVPB.. 3.375 Gram(s) IV Intermittent every 8 hours  polyethylene glycol 3350 17 Gram(s) Oral daily  senna 2 Tablet(s) Oral at bedtime  simvastatin 5 milliGRAM(s) Oral at bedtime  tamsulosin 0.4 milliGRAM(s) Oral at bedtime    MEDICATIONS  (PRN):  dextrose 40% Gel 15 Gram(s) Oral once PRN Blood Glucose LESS THAN 70 milliGRAM(s)/deciliter  glucagon  Injectable 1 milliGRAM(s) IntraMuscular once PRN Glucose LESS THAN 70 milligrams/deciliter        Objective:    Vitals: Vital Signs Last 24 Hrs  T(C): 36.6 (08-06-19 @ 05:32), Max: 37.3 (08-05-19 @ 12:39)  T(F): 97.8 (08-06-19 @ 05:32), Max: 99.1 (08-05-19 @ 12:39)  HR: 78 (08-06-19 @ 05:32) (78 - 81)  BP: 168/68 (08-06-19 @ 05:32) (116/65 - 168/68)  BP(mean): --  RR: 18 (08-06-19 @ 05:32) (18 - 18)  SpO2: 99% (08-06-19 @ 05:32) (98% - 99%)            I&O's Summary    05 Aug 2019 07:01  -  06 Aug 2019 07:00  --------------------------------------------------------  IN: 100 mL / OUT: 200 mL / NET: -100 mL      PHYSICAL EXAM:  GENERAL: NAD, well-groomed, well-developed  HEAD:  Atraumatic, Normocephalic  CHEST/LUNG: Clear to percussion bilaterally; No rales, rhonchi, wheezing, or rubs  HEART: Regular rate and rhythm; No murmurs, rubs, or gallops  ABDOMEN: Soft, Nontender, Nondistended; Bowel sounds present  EXTREMITIES:  L foot s/p TMA dressed, R foot dressed   NERVOUS SYSTEM:  Alert & Oriented X3, Good concentration                                  LABS:  08-06    138  |  100  |  10  ----------------------------<  141<H>  3.7   |  28  |  1.20  08-05    137  |  98  |  9   ----------------------------<  206<H>  3.6   |  28  |  1.15  08-04    137  |  97<L>  |  8   ----------------------------<  312<H>  3.6   |  28  |  1.08    Ca    8.9      06 Aug 2019 07:01  Ca    9.3      05 Aug 2019 07:05  Ca    8.3<L>      04 Aug 2019 05:35  Phos  3.0     08-06  Mg     1.8     08-06    TPro  6.9  /  Alb  3.0<L>  /  TBili  0.4  /  DBili  x   /  AST  23  /  ALT  44<H>  /  AlkPhos  176<H>  08-06  TPro  6.6  /  Alb  2.9<L>  /  TBili  0.4  /  DBili  x   /  AST  28  /  ALT  52<H>  /  AlkPhos  179<H>  08-05  TPro  6.2  /  Alb  2.7<L>  /  TBili  0.5  /  DBili  x   /  AST  25  /  ALT  48<H>  /  AlkPhos  172<H>  08-04                                              8.1    3.62  )-----------( 41       ( 06 Aug 2019 07:01 )             24.5                         8.0    3.21  )-----------( 44       ( 05 Aug 2019 07:05 )             24.4                         7.7    3.45  )-----------( 50       ( 04 Aug 2019 05:35 )             23.4     CAPILLARY BLOOD GLUCOSE  144 (05 Aug 2019 22:09)      POCT Blood Glucose.: 118 mg/dL (06 Aug 2019 07:27)  POCT Blood Glucose.: 144 mg/dL (05 Aug 2019 21:55)  POCT Blood Glucose.: 227 mg/dL (05 Aug 2019 17:05)  POCT Blood Glucose.: 228 mg/dL (05 Aug 2019 11:56)      RADIOLOGY & ADDITIONAL TESTS:    MRI Left Foot:  IMPRESSION:  Soft tissue ulceration over lateral plantar aspect of the TMA stump with   evidence for osteomyelitis involving the underlying 4th and 5th   metatarsal margins. The 4th and 5th metatarsal stumps are also noted to   be longer relative to the remaining metatarsal stump remnants suggesting   an aggressive edge.    Less specific marrow signal alteration in the 1st and 3rd metatarsal   stump remnant with grossly intact appearing overlying soft tissue   coverage. This may be related to ischemic change or reactive osteitis   however early or low-grade osteomyelitis in these regions cannot be   entirely excluded.    No discrete drainable abscess collections.    Lobular marginated subcortical marrow signal alteration in plantar aspect   of the calcaneus adjacent to thickened heterogeneous plantar fascia and   with intact overlying soft tissue coverage may reflect chronic   enthesopathic change.    Mild circumferential fluid distention of the flexor tendon sheaths may   reflect mild degree of tenosynovitis.    Imaging Personally Reviewed:  [ X] YES  [ ] NO      Consultants involved in case:   Consultant(s) Notes Reviewed:  [X ] YES  [ ] NO:   Care Discussed with Consultants/Other Providers [X ] YES  Dr. Child (ID)

## 2019-08-06 NOTE — PROGRESS NOTE ADULT - PROBLEM SELECTOR PLAN 5
Likely myelosuppression from Gemzar, recently given on last week   - continue to monitor CBC  - Holding AC as plts < 50

## 2019-08-06 NOTE — PROGRESS NOTE ADULT - PROBLEM SELECTOR PLAN 1
MRI findings with "Soft tissue ulceration over lateral plantar aspect of the TMA stump with evidence for osteomyelitis involving the underlying 4th and 5th   metatarsal margins. The 4th and 5th metatarsal stumps are also noted to   be longer relative to the remaining metatarsal stump remnants suggesting an aggressive edge."  - Plan for OR on thursday per podiatry   - C/w Zosyn per ID recs

## 2019-08-06 NOTE — PROGRESS NOTE ADULT - ASSESSMENT
63M with PMHx of extensive small cell lung cancer, metastatic to bone, and brain followed by Dr. Thao who presents with fever, chills found to have OM of left foot.

## 2019-08-06 NOTE — CHART NOTE - NSCHARTNOTEFT_GEN_A_CORE
MRI positive for OM of 4th and 5th metatarsal stumps. Podiatry plan to take patient to OR Thursday 8/8 at 7pm for revisional TMA. Spoke with ID, Primary, and Heme/Onc teams aware.  Please document medical optimization for local anesthesia with sedation.     Reina Claire PGY2

## 2019-08-07 ENCOUNTER — TRANSCRIPTION ENCOUNTER (OUTPATIENT)
Age: 64
End: 2019-08-07

## 2019-08-07 LAB
ALBUMIN SERPL ELPH-MCNC: 3.2 G/DL — LOW (ref 3.3–5)
ALP SERPL-CCNC: 164 U/L — HIGH (ref 40–120)
ALT FLD-CCNC: 33 U/L — SIGNIFICANT CHANGE UP (ref 4–41)
ANION GAP SERPL CALC-SCNC: 11 MMO/L — SIGNIFICANT CHANGE UP (ref 7–14)
AST SERPL-CCNC: 18 U/L — SIGNIFICANT CHANGE UP (ref 4–40)
BILIRUB SERPL-MCNC: 0.4 MG/DL — SIGNIFICANT CHANGE UP (ref 0.2–1.2)
BLD GP AB SCN SERPL QL: NEGATIVE — SIGNIFICANT CHANGE UP
BUN SERPL-MCNC: 11 MG/DL — SIGNIFICANT CHANGE UP (ref 7–23)
CALCIUM SERPL-MCNC: 8.9 MG/DL — SIGNIFICANT CHANGE UP (ref 8.4–10.5)
CHLORIDE SERPL-SCNC: 100 MMOL/L — SIGNIFICANT CHANGE UP (ref 98–107)
CO2 SERPL-SCNC: 28 MMOL/L — SIGNIFICANT CHANGE UP (ref 22–31)
CREAT SERPL-MCNC: 1.26 MG/DL — SIGNIFICANT CHANGE UP (ref 0.5–1.3)
GLUCOSE BLDC GLUCOMTR-MCNC: 123 MG/DL — HIGH (ref 70–99)
GLUCOSE BLDC GLUCOMTR-MCNC: 134 MG/DL — HIGH (ref 70–99)
GLUCOSE BLDC GLUCOMTR-MCNC: 166 MG/DL — HIGH (ref 70–99)
GLUCOSE BLDC GLUCOMTR-MCNC: 175 MG/DL — HIGH (ref 70–99)
GLUCOSE SERPL-MCNC: 134 MG/DL — HIGH (ref 70–99)
HCT VFR BLD CALC: 24.1 % — LOW (ref 39–50)
HGB BLD-MCNC: 7.8 G/DL — LOW (ref 13–17)
MAGNESIUM SERPL-MCNC: 2 MG/DL — SIGNIFICANT CHANGE UP (ref 1.6–2.6)
MCHC RBC-ENTMCNC: 30.2 PG — SIGNIFICANT CHANGE UP (ref 27–34)
MCHC RBC-ENTMCNC: 32.4 % — SIGNIFICANT CHANGE UP (ref 32–36)
MCV RBC AUTO: 93.4 FL — SIGNIFICANT CHANGE UP (ref 80–100)
NRBC # FLD: 0.03 K/UL — SIGNIFICANT CHANGE UP (ref 0–0)
PHOSPHATE SERPL-MCNC: 3.1 MG/DL — SIGNIFICANT CHANGE UP (ref 2.5–4.5)
PLATELET # BLD AUTO: 46 K/UL — LOW (ref 150–400)
PMV BLD: 11.1 FL — SIGNIFICANT CHANGE UP (ref 7–13)
POTASSIUM SERPL-MCNC: 3.8 MMOL/L — SIGNIFICANT CHANGE UP (ref 3.5–5.3)
POTASSIUM SERPL-SCNC: 3.8 MMOL/L — SIGNIFICANT CHANGE UP (ref 3.5–5.3)
PROT SERPL-MCNC: 6.9 G/DL — SIGNIFICANT CHANGE UP (ref 6–8.3)
RBC # BLD: 2.58 M/UL — LOW (ref 4.2–5.8)
RBC # FLD: 13.6 % — SIGNIFICANT CHANGE UP (ref 10.3–14.5)
RH IG SCN BLD-IMP: POSITIVE — SIGNIFICANT CHANGE UP
SODIUM SERPL-SCNC: 139 MMOL/L — SIGNIFICANT CHANGE UP (ref 135–145)
WBC # BLD: 4.15 K/UL — SIGNIFICANT CHANGE UP (ref 3.8–10.5)
WBC # FLD AUTO: 4.15 K/UL — SIGNIFICANT CHANGE UP (ref 3.8–10.5)

## 2019-08-07 PROCEDURE — 99233 SBSQ HOSP IP/OBS HIGH 50: CPT

## 2019-08-07 RX ORDER — SODIUM CHLORIDE 9 MG/ML
1000 INJECTION INTRAMUSCULAR; INTRAVENOUS; SUBCUTANEOUS
Refills: 0 | Status: DISCONTINUED | OUTPATIENT
Start: 2019-08-07 | End: 2019-08-09

## 2019-08-07 RX ADMIN — TAMSULOSIN HYDROCHLORIDE 0.4 MILLIGRAM(S): 0.4 CAPSULE ORAL at 21:35

## 2019-08-07 RX ADMIN — SODIUM CHLORIDE 40 MILLILITER(S): 9 INJECTION INTRAMUSCULAR; INTRAVENOUS; SUBCUTANEOUS at 21:35

## 2019-08-07 RX ADMIN — FAMOTIDINE 40 MILLIGRAM(S): 10 INJECTION INTRAVENOUS at 21:35

## 2019-08-07 RX ADMIN — SIMVASTATIN 5 MILLIGRAM(S): 20 TABLET, FILM COATED ORAL at 21:35

## 2019-08-07 RX ADMIN — POLYETHYLENE GLYCOL 3350 17 GRAM(S): 17 POWDER, FOR SOLUTION ORAL at 13:01

## 2019-08-07 RX ADMIN — Medication 4 UNIT(S): at 17:24

## 2019-08-07 RX ADMIN — Medication 4 UNIT(S): at 13:00

## 2019-08-07 RX ADMIN — SODIUM CHLORIDE 40 MILLILITER(S): 9 INJECTION INTRAMUSCULAR; INTRAVENOUS; SUBCUTANEOUS at 09:37

## 2019-08-07 RX ADMIN — Medication 1: at 17:24

## 2019-08-07 RX ADMIN — INSULIN GLARGINE 30 UNIT(S): 100 INJECTION, SOLUTION SUBCUTANEOUS at 21:34

## 2019-08-07 RX ADMIN — PIPERACILLIN AND TAZOBACTAM 25 GRAM(S): 4; .5 INJECTION, POWDER, LYOPHILIZED, FOR SOLUTION INTRAVENOUS at 21:34

## 2019-08-07 RX ADMIN — PIPERACILLIN AND TAZOBACTAM 25 GRAM(S): 4; .5 INJECTION, POWDER, LYOPHILIZED, FOR SOLUTION INTRAVENOUS at 06:49

## 2019-08-07 RX ADMIN — Medication 100 MILLIGRAM(S): at 13:01

## 2019-08-07 RX ADMIN — Medication 100 MILLIGRAM(S): at 06:49

## 2019-08-07 RX ADMIN — Medication 4 UNIT(S): at 08:00

## 2019-08-07 RX ADMIN — PIPERACILLIN AND TAZOBACTAM 25 GRAM(S): 4; .5 INJECTION, POWDER, LYOPHILIZED, FOR SOLUTION INTRAVENOUS at 13:01

## 2019-08-07 RX ADMIN — CHLORHEXIDINE GLUCONATE 1 APPLICATION(S): 213 SOLUTION TOPICAL at 13:00

## 2019-08-07 NOTE — PROGRESS NOTE ADULT - PROBLEM SELECTOR PLAN 5
Likely myelosuppression from Gemzar, recently given on last week   - continue to monitor CBC  - Holding AC as plts < 50 Likely myelosuppression from Gemzar, recently given on last week   - continue to monitor CBC  - Holding AC as plts < 50   - PLTs in [place] from chemo, will likely improve. Can likely transfuse platelets prior to OR if necessary, discussed with Dr. Manuel and DPM resident.

## 2019-08-07 NOTE — PROGRESS NOTE ADULT - SUBJECTIVE AND OBJECTIVE BOX
Patient is a 63y old  Male who presents with a chief complaint of fever (06 Aug 2019 11:46)      Subjective: spoke with patient via [facility]  [id number]. Patient states he has never been told he has heart problems. He can walk > 6 blocks without LINTON, chest pain or dizziness. He states he is limited on stairs due to pain in his feet. He currently does not have any chest pain, SOB, LINTON.     MEDICATIONS  (STANDING):  chlorhexidine 4% Liquid 1 Application(s) Topical daily  dextrose 5%. 1000 milliLiter(s) (50 mL/Hr) IV Continuous <Continuous>  dextrose 50% Injectable 12.5 Gram(s) IV Push once  dextrose 50% Injectable 25 Gram(s) IV Push once  dextrose 50% Injectable 25 Gram(s) IV Push once  docusate sodium 100 milliGRAM(s) Oral three times a day  famotidine    Tablet 40 milliGRAM(s) Oral at bedtime  insulin glargine Injectable (LANTUS) 30 Unit(s) SubCutaneous at bedtime  insulin lispro (HumaLOG) corrective regimen sliding scale   SubCutaneous three times a day before meals  insulin lispro Injectable (HumaLOG) 4 Unit(s) SubCutaneous three times a day before meals  piperacillin/tazobactam IVPB.. 3.375 Gram(s) IV Intermittent every 8 hours  polyethylene glycol 3350 17 Gram(s) Oral daily  senna 2 Tablet(s) Oral at bedtime  simvastatin 5 milliGRAM(s) Oral at bedtime  sodium chloride 0.9%. 1000 milliLiter(s) (40 mL/Hr) IV Continuous <Continuous>  tamsulosin 0.4 milliGRAM(s) Oral at bedtime    MEDICATIONS  (PRN):  dextrose 40% Gel 15 Gram(s) Oral once PRN Blood Glucose LESS THAN 70 milliGRAM(s)/deciliter  glucagon  Injectable 1 milliGRAM(s) IntraMuscular once PRN Glucose LESS THAN 70 milligrams/deciliter        Objective:    Vitals: Vital Signs Last 24 Hrs  T(C): 36.6 (08-07-19 @ 10:44), Max: 37.2 (08-06-19 @ 20:49)  T(F): 97.8 (08-07-19 @ 10:44), Max: 98.9 (08-06-19 @ 20:49)  HR: 74 (08-07-19 @ 10:44) (69 - 77)  BP: 133/67 (08-07-19 @ 10:44) (133/67 - 139/63)  BP(mean): --  RR: 17 (08-07-19 @ 10:44) (17 - 17)  SpO2: 98% (08-07-19 @ 10:44) (98% - 98%)            I&O's Summary        PHYSICAL EXAM:  GENERAL: NAD, well-groomed, well-developed  HEAD:  Atraumatic, Normocephalic  CHEST/LUNG: Clear to percussion bilaterally; No rales, rhonchi, wheezing, or rubs  HEART: Regular rate and rhythm; No murmurs, rubs, or gallops  ABDOMEN: Soft, Nontender, Nondistended; Bowel sounds present  EXTREMITIES:  L foot s/p TMA dressed, R foot dressed   NERVOUS SYSTEM:  Alert & Oriented X3, Good concentration                                          LABS:  08-07    139  |  100  |  11  ----------------------------<  134<H>  3.8   |  28  |  1.26  08-06    138  |  100  |  10  ----------------------------<  141<H>  3.7   |  28  |  1.20  08-05    137  |  98  |  9   ----------------------------<  206<H>  3.6   |  28  |  1.15    Ca    8.9      07 Aug 2019 05:33  Ca    8.9      06 Aug 2019 07:01  Ca    9.3      05 Aug 2019 07:05  Phos  3.1     08-07  Mg     2.0     08-07    TPro  6.9  /  Alb  3.2<L>  /  TBili  0.4  /  DBili  x   /  AST  18  /  ALT  33  /  AlkPhos  164<H>  08-07  TPro  6.9  /  Alb  3.0<L>  /  TBili  0.4  /  DBili  x   /  AST  23  /  ALT  44<H>  /  AlkPhos  176<H>  08-06  TPro  6.6  /  Alb  2.9<L>  /  TBili  0.4  /  DBili  x   /  AST  28  /  ALT  52<H>  /  AlkPhos  179<H>  08-05                                              7.8    4.15  )-----------( 46       ( 07 Aug 2019 05:33 )             24.1                         8.1    3.62  )-----------( 41       ( 06 Aug 2019 07:01 )             24.5                         8.0    3.21  )-----------( 44       ( 05 Aug 2019 07:05 )             24.4     CAPILLARY BLOOD GLUCOSE      POCT Blood Glucose.: 123 mg/dL (07 Aug 2019 12:37)  POCT Blood Glucose.: 134 mg/dL (07 Aug 2019 07:51)  POCT Blood Glucose.: 215 mg/dL (06 Aug 2019 20:57)  POCT Blood Glucose.: 234 mg/dL (06 Aug 2019 17:04)      RADIOLOGY & ADDITIONAL TESTS:    Imaging Personally Reviewed:  [X ] YES  [ ] NO      Consultants involved in case:   Consultant(s) Notes Reviewed:  [X ] YES  [ ] NO:   Care Discussed with Consultants/Other Providers [X ] YES Dr. Child (ID) Patient is a 63y old  Male who presents with a chief complaint of fever (06 Aug 2019 11:46)      Subjective: spoke with patient via [facility]  [id number]. Patient states he has never been told he has heart problems. He can walk > 6 blocks without LINTON, chest pain or dizziness. He states he is limited on stairs due to pain in his feet. He currently does not have any chest pain, SOB, LINTON.     MEDICATIONS  (STANDING):  chlorhexidine 4% Liquid 1 Application(s) Topical daily  dextrose 5%. 1000 milliLiter(s) (50 mL/Hr) IV Continuous <Continuous>  dextrose 50% Injectable 12.5 Gram(s) IV Push once  dextrose 50% Injectable 25 Gram(s) IV Push once  dextrose 50% Injectable 25 Gram(s) IV Push once  docusate sodium 100 milliGRAM(s) Oral three times a day  famotidine    Tablet 40 milliGRAM(s) Oral at bedtime  insulin glargine Injectable (LANTUS) 30 Unit(s) SubCutaneous at bedtime  insulin lispro (HumaLOG) corrective regimen sliding scale   SubCutaneous three times a day before meals  insulin lispro Injectable (HumaLOG) 4 Unit(s) SubCutaneous three times a day before meals  piperacillin/tazobactam IVPB.. 3.375 Gram(s) IV Intermittent every 8 hours  polyethylene glycol 3350 17 Gram(s) Oral daily  senna 2 Tablet(s) Oral at bedtime  simvastatin 5 milliGRAM(s) Oral at bedtime  sodium chloride 0.9%. 1000 milliLiter(s) (40 mL/Hr) IV Continuous <Continuous>  tamsulosin 0.4 milliGRAM(s) Oral at bedtime    MEDICATIONS  (PRN):  dextrose 40% Gel 15 Gram(s) Oral once PRN Blood Glucose LESS THAN 70 milliGRAM(s)/deciliter  glucagon  Injectable 1 milliGRAM(s) IntraMuscular once PRN Glucose LESS THAN 70 milligrams/deciliter        Objective:    Vitals: Vital Signs Last 24 Hrs  T(C): 36.6 (08-07-19 @ 10:44), Max: 37.2 (08-06-19 @ 20:49)  T(F): 97.8 (08-07-19 @ 10:44), Max: 98.9 (08-06-19 @ 20:49)  HR: 74 (08-07-19 @ 10:44) (69 - 77)  BP: 133/67 (08-07-19 @ 10:44) (133/67 - 139/63)  BP(mean): --  RR: 17 (08-07-19 @ 10:44) (17 - 17)  SpO2: 98% (08-07-19 @ 10:44) (98% - 98%)            I&O's Summary        PHYSICAL EXAM:  GENERAL: NAD, well-groomed, well-developed  HEAD:  Atraumatic, Normocephalic  CHEST/LUNG: Clear to percussion bilaterally; No rales, rhonchi, wheezing, or rubs  HEART: Regular rate and rhythm; No murmurs, rubs, or gallops  ABDOMEN: Soft, Nontender, Nondistended; Bowel sounds present  EXTREMITIES:  L foot s/p TMA dressed, R foot dressed   NERVOUS SYSTEM:  Alert & Oriented X3, Good concentration                                          LABS:  08-07    139  |  100  |  11  ----------------------------<  134<H>  3.8   |  28  |  1.26  08-06    138  |  100  |  10  ----------------------------<  141<H>  3.7   |  28  |  1.20  08-05    137  |  98  |  9   ----------------------------<  206<H>  3.6   |  28  |  1.15    Ca    8.9      07 Aug 2019 05:33  Ca    8.9      06 Aug 2019 07:01  Ca    9.3      05 Aug 2019 07:05  Phos  3.1     08-07  Mg     2.0     08-07    TPro  6.9  /  Alb  3.2<L>  /  TBili  0.4  /  DBili  x   /  AST  18  /  ALT  33  /  AlkPhos  164<H>  08-07  TPro  6.9  /  Alb  3.0<L>  /  TBili  0.4  /  DBili  x   /  AST  23  /  ALT  44<H>  /  AlkPhos  176<H>  08-06  TPro  6.6  /  Alb  2.9<L>  /  TBili  0.4  /  DBili  x   /  AST  28  /  ALT  52<H>  /  AlkPhos  179<H>  08-05                                              7.8    4.15  )-----------( 46       ( 07 Aug 2019 05:33 )             24.1                         8.1    3.62  )-----------( 41       ( 06 Aug 2019 07:01 )             24.5                         8.0    3.21  )-----------( 44       ( 05 Aug 2019 07:05 )             24.4     CAPILLARY BLOOD GLUCOSE      POCT Blood Glucose.: 123 mg/dL (07 Aug 2019 12:37)  POCT Blood Glucose.: 134 mg/dL (07 Aug 2019 07:51)  POCT Blood Glucose.: 215 mg/dL (06 Aug 2019 20:57)  POCT Blood Glucose.: 234 mg/dL (06 Aug 2019 17:04)      RADIOLOGY & ADDITIONAL TESTS:    Imaging Personally Reviewed:  [X ] YES  [ ] NO      Consultants involved in case:   Consultant(s) Notes Reviewed:  [X ] YES  [ ] NO:   Care Discussed with Consultants/Other Providers [X ] YES Dr. Child (ID), [place] (onc), DPM resident

## 2019-08-07 NOTE — PROGRESS NOTE ADULT - SUBJECTIVE AND OBJECTIVE BOX
CC: F/U for OM    Saw/spoke to patient. No fevers, no chills. No new complaints. Unchanged.    Allergies  No Known Allergies    ANTIMICROBIALS:  piperacillin/tazobactam IVPB.. 3.375 every 8 hours    PE:    Vital Signs Last 24 Hrs  T(C): 36.6 (07 Aug 2019 10:44), Max: 37.2 (06 Aug 2019 20:49)  T(F): 97.8 (07 Aug 2019 10:44), Max: 98.9 (06 Aug 2019 20:49)  HR: 74 (07 Aug 2019 10:44) (69 - 77)  BP: 133/67 (07 Aug 2019 10:44) (133/67 - 139/63)  RR: 17 (07 Aug 2019 10:44) (17 - 17)  SpO2: 98% (07 Aug 2019 10:44) (98% - 98%)    Gen: AOx3, NAD, non-toxic, pleasant  CV: S1+S2 normal, nontachycardic  Resp: Clear bilat, no resp distress, no crackles/wheezes  Abd: Soft, nontender, +BS  Ext: LLE TMA; RLE hallux wound    LABS:                        7.8    4.15  )-----------( 46       ( 07 Aug 2019 05:33 )             24.1     08-07    139  |  100  |  11  ----------------------------<  134<H>  3.8   |  28  |  1.26    Ca    8.9      07 Aug 2019 05:33  Phos  3.1     08-07  Mg     2.0     08-07    TPro  6.9  /  Alb  3.2<L>  /  TBili  0.4  /  DBili  x   /  AST  18  /  ALT  33  /  AlkPhos  164<H>  08-07    MICROBIOLOGY:    BLOOD  08-05-19 NGTD    URINE CATHETER  08-02-19 NGTD    (otherwise reviewed)    RADIOLOGY:    8/5 MR:    IMPRESSION:  Soft tissue ulceration over lateral plantar aspect of the TMA stump with   evidence for osteomyelitis involving the underlying 4th and 5th   metatarsal margins. The 4th and 5th metatarsal stumps are also noted to   be longer relative to the remaining metatarsal stump remnants suggesting   an aggressive edge.    Less specific marrow signal alteration in the 1st and 3rd metatarsal   stump remnant with grossly intact appearing overlying soft tissue   coverage. This may be related to ischemic change or reactive osteitis   however early or low-grade osteomyelitis in these regions cannot be   entirely excluded.    No discrete drainable abscess collections.    Lobular marginated subcortical marrow signal alteration in plantar aspect   of the calcaneus adjacent to thickened heterogeneous plantar fascia and   with intact overlying soft tissue coverage may reflect chronic   enthesopathic change.    Mild circumferential fluid distention of the flexor tendon sheaths may   reflect mild degree of tenosynovitis.

## 2019-08-07 NOTE — PROGRESS NOTE ADULT - ASSESSMENT
62 yo M pt w/ b/l foot wounds   - pt seen and evaluated  - VSS, WBC 3.21,   - right foot plantar hallux wound probes to subQ w/ no purulence expressed, wound does not track, demonstrates no undermining, and probes to subQ only, no malodor was noted  - Left foot- s/p TMA w/ central wound w/ a granular base that probes to subcutaneous tissue- wound does not track, no undermining, no malodor noted, and no drainage expressed  - Wounds applied betadine and dressed with DSD  - X-ray results reviewed and discussed with pt demonstrated acute fracture of right foot distal phalanx of hallux, left foot demonstrates no changes associated with osteomyelitis and is little concern as wound is stable.   - Pod believes elevated ESR is attributable with combination of acute fracture in conjunction with patient's small cell lung cancer  - Left foot MRI results reviewed showing osteomyelitis of 4th and 5th metatarsal stumps & early osteomyelitis of 1st and 3rd of the left foot.  - Pod plan for OR and pt booked for Left foot revisional TMA w/ PT/Achilles tenotomy and Right foot Hallux Bone Biopsy with Dr. Lafleur on 8/8  - Please document medical clearance  - seen w/ attending

## 2019-08-07 NOTE — PROGRESS NOTE ADULT - ASSESSMENT
64 yo M with PMH extensive small cell lung cancer, metastatic to bone, and brain on Gemcitabine (last dose Mon 7/29/19), HTN, HLD, DM, CKD. P/w fever, chills, urinary incontinence and cough with white sputum.  Fever, leukocytosis  R foot toe wound; L foot TMA wound  Per note, patient has purulence expressed from Hallux wound prior  Both wounds appear chronic/longstanding, with minimal erythema or discharge  BCX NGTD, UCX neg  Now MR with OM at 4th/5th metatarsal margins  Overall, fever, wound infection, elevated ESR, OM  - Zosyn 3.375g q 8  - Consider vanco if signs worsening  - F/U podiatry, possible plan for TMA revision for OM resection  - Antibiotic planning based on OR results  - Discussed case with medicine team    My colleagues will be covering this patient on 8/8, please page on call ID fellow with questions or change in status.    Elmer Child MD  Pager [phone]  After 5pm and on weekends call [phone]

## 2019-08-07 NOTE — PROGRESS NOTE ADULT - ASSESSMENT
63m with extensive stage, SCLC, s/p progression on carbo/etop and nivolumab, started on single agent gemcitabine, s/p GK to brain lesion, presenting with fever.   Has mild leukocytosis with left shift.  Pt has a chronic diabetic foot ulcer, has had this for more than 1 year. He has OM. Plan is for amputation.     -Agree with amputation, plts are at [place] 2/2 gemcitabine. Expect plts to recover in the next few days. If surgery is not emergent, would wait for plt recover. If surgery is emergent, can consider platelet transfusion.   -Monitor plt count, thrombocytopenia in the setting of gemcitabine. Discontinue anticoagulation of plt<50k  -Monitor hgb, transfuse as needed  -Supportive care, pain control, Nutrition, PT, DVT ppx  -Outpatient oncology f/u    Will follow. Please do not hesitate to call back with questions.     Mine Manuel MD  Medical Oncology Attending  C: [phone]

## 2019-08-07 NOTE — PROGRESS NOTE ADULT - SUBJECTIVE AND OBJECTIVE BOX
Patient is a 63y old  Male who presents with a chief complaint of fever (06 Aug 2019 11:46)       INTERVAL HPI/OVERNIGHT EVENTS:  Patient seen and evaluated at bedside.  Pt is resting comfortable in NAD. Denies N/V/F/C.     Allergies    No Known Allergies    Intolerances        Vital Signs Last 24 Hrs  T(C): 36.6 (07 Aug 2019 06:49), Max: 37.2 (06 Aug 2019 20:49)  T(F): 97.9 (07 Aug 2019 06:49), Max: 98.9 (06 Aug 2019 20:49)  HR: 69 (07 Aug 2019 06:49) (69 - 77)  BP: 139/63 (07 Aug 2019 06:49) (138/76 - 150/84)  BP(mean): --  RR: 17 (07 Aug 2019 06:49) (17 - 18)  SpO2: 98% (07 Aug 2019 06:49) (98% - 98%)    LABS:                        7.8    4.15  )-----------( 46       ( 07 Aug 2019 05:33 )             24.1     08-07    139  |  100  |  11  ----------------------------<  134<H>  3.8   |  28  |  1.26    Ca    8.9      07 Aug 2019 05:33  Phos  3.1     08-07  Mg     2.0     08-07    TPro  6.9  /  Alb  3.2<L>  /  TBili  0.4  /  DBili  x   /  AST  18  /  ALT  33  /  AlkPhos  164<H>  08-07        CAPILLARY BLOOD GLUCOSE      POCT Blood Glucose.: 134 mg/dL (07 Aug 2019 07:51)  POCT Blood Glucose.: 215 mg/dL (06 Aug 2019 20:57)  POCT Blood Glucose.: 234 mg/dL (06 Aug 2019 17:04)  POCT Blood Glucose.: 144 mg/dL (06 Aug 2019 11:57)      Lower Extremity Physical Exam:    Vascular: DP/PT 2/4 B/L, CFT <3 seconds R, Temperature gradient warm to cool B/L  Musculoskeletal/Ortho: TMA of left foot  Skin:  Wound #1:   Location: midfoot left foot centrally  Size: 2.4cm x 3.4 cm  Depth: probes down to subQ, no tracking or undermining   Wound bed: granular  Drainage: none  Odor: none  Periwound: fibrous    Wound #2:   Location: right foot plantar distal hallux wound  Size: 2mm x 2 mm  Depth: probes down to subQ, no tracking or undermining   Wound bed: granular  Drainage: none  Odor: none  Periwound: hyperkeratotic    RADIOLOGY & ADDITIONAL TESTS:

## 2019-08-07 NOTE — PROGRESS NOTE ADULT - SUBJECTIVE AND OBJECTIVE BOX
INTERVAL HPI/OVERNIGHT EVENTS:  Patient seen at bedside.      VITAL SIGNS:  T(F): 97.9 (08-07-19 @ 06:49)  HR: 69 (08-07-19 @ 06:49)  BP: 139/63 (08-07-19 @ 06:49)  RR: 17 (08-07-19 @ 06:49)  SpO2: 98% (08-07-19 @ 06:49)  Wt(kg): --    PHYSICAL EXAM:    Constitutional: NAD, resting in bed comfortably  Eyes: EOMI, sclera non-icteric  Neck: supple, no LAP  Respiratory: CTA b/l, good air entry b/l, no wheezing, rhonchi or crackels  Cardiovascular: RRR, normal S1S2, no M/R/G  Gastrointestinal: soft, NTND  Extremities: no edema  Neurological: AAOx3, non focal  Skin: Normal temperature    MEDICATIONS  (STANDING):  chlorhexidine 4% Liquid 1 Application(s) Topical daily  dextrose 5%. 1000 milliLiter(s) (50 mL/Hr) IV Continuous <Continuous>  dextrose 50% Injectable 12.5 Gram(s) IV Push once  dextrose 50% Injectable 25 Gram(s) IV Push once  dextrose 50% Injectable 25 Gram(s) IV Push once  docusate sodium 100 milliGRAM(s) Oral three times a day  famotidine    Tablet 40 milliGRAM(s) Oral at bedtime  insulin glargine Injectable (LANTUS) 30 Unit(s) SubCutaneous at bedtime  insulin lispro (HumaLOG) corrective regimen sliding scale   SubCutaneous three times a day before meals  insulin lispro Injectable (HumaLOG) 4 Unit(s) SubCutaneous three times a day before meals  piperacillin/tazobactam IVPB.. 3.375 Gram(s) IV Intermittent every 8 hours  polyethylene glycol 3350 17 Gram(s) Oral daily  senna 2 Tablet(s) Oral at bedtime  simvastatin 5 milliGRAM(s) Oral at bedtime  sodium chloride 0.9%. 1000 milliLiter(s) (40 mL/Hr) IV Continuous <Continuous>  tamsulosin 0.4 milliGRAM(s) Oral at bedtime    MEDICATIONS  (PRN):  dextrose 40% Gel 15 Gram(s) Oral once PRN Blood Glucose LESS THAN 70 milliGRAM(s)/deciliter  glucagon  Injectable 1 milliGRAM(s) IntraMuscular once PRN Glucose LESS THAN 70 milligrams/deciliter      Allergies    No Known Allergies    Intolerances        LABS:                        7.8    4.15  )-----------( 46       ( 07 Aug 2019 05:33 )             24.1     08-07    139  |  100  |  11  ----------------------------<  134<H>  3.8   |  28  |  1.26    Ca    8.9      07 Aug 2019 05:33  Phos  3.1     08-07  Mg     2.0     08-07    TPro  6.9  /  Alb  3.2<L>  /  TBili  0.4  /  DBili  x   /  AST  18  /  ALT  33  /  AlkPhos  164<H>  08-07          RADIOLOGY & ADDITIONAL TESTS:  Studies reviewed. INTERVAL HPI/OVERNIGHT EVENTS:  Patient seen at bedside. He is sleeping comfortably.       VITAL SIGNS:  T(F): 97.9 (08-07-19 @ 06:49)  HR: 69 (08-07-19 @ 06:49)  BP: 139/63 (08-07-19 @ 06:49)  RR: 17 (08-07-19 @ 06:49)  SpO2: 98% (08-07-19 @ 06:49)  Wt(kg): --    PHYSICAL EXAM:    Constitutional: NAD, resting in bed comfortably  Eyes: EOMI, sclera non-icteric  Neck: supple, no LAP  Respiratory: CTA b/l, good air entry b/l, no wheezing, rhonchi or crackels  Cardiovascular: RRR, normal S1S2, no M/R/G  Gastrointestinal: soft, NTND  Extremities: no edema  Neurological: AAOx3, non focal  Skin: Normal temperature    MEDICATIONS  (STANDING):  chlorhexidine 4% Liquid 1 Application(s) Topical daily  dextrose 5%. 1000 milliLiter(s) (50 mL/Hr) IV Continuous <Continuous>  dextrose 50% Injectable 12.5 Gram(s) IV Push once  dextrose 50% Injectable 25 Gram(s) IV Push once  dextrose 50% Injectable 25 Gram(s) IV Push once  docusate sodium 100 milliGRAM(s) Oral three times a day  famotidine    Tablet 40 milliGRAM(s) Oral at bedtime  insulin glargine Injectable (LANTUS) 30 Unit(s) SubCutaneous at bedtime  insulin lispro (HumaLOG) corrective regimen sliding scale   SubCutaneous three times a day before meals  insulin lispro Injectable (HumaLOG) 4 Unit(s) SubCutaneous three times a day before meals  piperacillin/tazobactam IVPB.. 3.375 Gram(s) IV Intermittent every 8 hours  polyethylene glycol 3350 17 Gram(s) Oral daily  senna 2 Tablet(s) Oral at bedtime  simvastatin 5 milliGRAM(s) Oral at bedtime  sodium chloride 0.9%. 1000 milliLiter(s) (40 mL/Hr) IV Continuous <Continuous>  tamsulosin 0.4 milliGRAM(s) Oral at bedtime    MEDICATIONS  (PRN):  dextrose 40% Gel 15 Gram(s) Oral once PRN Blood Glucose LESS THAN 70 milliGRAM(s)/deciliter  glucagon  Injectable 1 milliGRAM(s) IntraMuscular once PRN Glucose LESS THAN 70 milligrams/deciliter      Allergies    No Known Allergies    Intolerances        LABS:                        7.8    4.15  )-----------( 46       ( 07 Aug 2019 05:33 )             24.1     08-07    139  |  100  |  11  ----------------------------<  134<H>  3.8   |  28  |  1.26    Ca    8.9      07 Aug 2019 05:33  Phos  3.1     08-07  Mg     2.0     08-07    TPro  6.9  /  Alb  3.2<L>  /  TBili  0.4  /  DBili  x   /  AST  18  /  ALT  33  /  AlkPhos  164<H>  08-07          RADIOLOGY & ADDITIONAL TESTS:  Studies reviewed.

## 2019-08-07 NOTE — PROGRESS NOTE ADULT - PROBLEM SELECTOR PLAN 1
MRI findings with "Soft tissue ulceration over lateral plantar aspect of the TMA stump with evidence for osteomyelitis involving the underlying 4th and 5th   metatarsal margins. The 4th and 5th metatarsal stumps are also noted to   be longer relative to the remaining metatarsal stump remnants suggesting an aggressive edge."  - Plan for OR on tomorrow for Left foot revisional TMA w/ PT/Achilles tenotomy and Right foot Hallux Bone Biopsy   - C/w Zosyn per ID recs  - Patient has an RCRI score of 1 and no previous cardiac disease or intervention. Patient can proceed to OR without further testing. Please be aware of thrombocytopenia, consider PLTs transfusion MRI findings with "Soft tissue ulceration over lateral plantar aspect of the TMA stump with evidence for osteomyelitis involving the underlying 4th and 5th   metatarsal margins. The 4th and 5th metatarsal stumps are also noted to   be longer relative to the remaining metatarsal stump remnants suggesting an aggressive edge."  - Plan for OR on tomorrow for Left foot revisional TMA w/ PT/Achilles tenotomy and Right foot Hallux Bone Biopsy   - C/w Zosyn per ID recs  - Patient has an RCRI score of 1 and no previous cardiac disease or intervention. Patient medically optimized and can proceed to OR without further testing. Please be aware of thrombocytopenia, consider PLTs transfusion tomorrow prior to OR. Discussed with Dr. Manuel and podiatry resident.

## 2019-08-08 ENCOUNTER — RESULT REVIEW (OUTPATIENT)
Age: 64
End: 2019-08-08

## 2019-08-08 DIAGNOSIS — M86.172 OTHER ACUTE OSTEOMYELITIS, LEFT ANKLE AND FOOT: ICD-10-CM

## 2019-08-08 DIAGNOSIS — M21.6X9 OTHER ACQUIRED DEFORMITIES OF UNSPECIFIED FOOT: ICD-10-CM

## 2019-08-08 DIAGNOSIS — M86.9 OSTEOMYELITIS, UNSPECIFIED: ICD-10-CM

## 2019-08-08 LAB
ALBUMIN SERPL ELPH-MCNC: 3.8 G/DL — SIGNIFICANT CHANGE UP (ref 3.3–5)
ALP SERPL-CCNC: 156 U/L — HIGH (ref 40–120)
ALT FLD-CCNC: 31 U/L — SIGNIFICANT CHANGE UP (ref 4–41)
ANION GAP SERPL CALC-SCNC: 11 MMO/L — SIGNIFICANT CHANGE UP (ref 7–14)
ANION GAP SERPL CALC-SCNC: 11 MMO/L — SIGNIFICANT CHANGE UP (ref 7–14)
APTT BLD: 29.8 SEC — SIGNIFICANT CHANGE UP (ref 27.5–36.3)
AST SERPL-CCNC: 17 U/L — SIGNIFICANT CHANGE UP (ref 4–40)
BILIRUB SERPL-MCNC: 0.4 MG/DL — SIGNIFICANT CHANGE UP (ref 0.2–1.2)
BLD GP AB SCN SERPL QL: NEGATIVE — SIGNIFICANT CHANGE UP
BUN SERPL-MCNC: 11 MG/DL — SIGNIFICANT CHANGE UP (ref 7–23)
BUN SERPL-MCNC: 11 MG/DL — SIGNIFICANT CHANGE UP (ref 7–23)
CALCIUM SERPL-MCNC: 9.2 MG/DL — SIGNIFICANT CHANGE UP (ref 8.4–10.5)
CALCIUM SERPL-MCNC: 9.2 MG/DL — SIGNIFICANT CHANGE UP (ref 8.4–10.5)
CHLORIDE SERPL-SCNC: 103 MMOL/L — SIGNIFICANT CHANGE UP (ref 98–107)
CHLORIDE SERPL-SCNC: 103 MMOL/L — SIGNIFICANT CHANGE UP (ref 98–107)
CO2 SERPL-SCNC: 27 MMOL/L — SIGNIFICANT CHANGE UP (ref 22–31)
CO2 SERPL-SCNC: 27 MMOL/L — SIGNIFICANT CHANGE UP (ref 22–31)
CREAT SERPL-MCNC: 1.36 MG/DL — HIGH (ref 0.5–1.3)
CREAT SERPL-MCNC: 1.36 MG/DL — HIGH (ref 0.5–1.3)
GLUCOSE BLDC GLUCOMTR-MCNC: 117 MG/DL — HIGH (ref 70–99)
GLUCOSE BLDC GLUCOMTR-MCNC: 125 MG/DL — HIGH (ref 70–99)
GLUCOSE BLDC GLUCOMTR-MCNC: 90 MG/DL — SIGNIFICANT CHANGE UP (ref 70–99)
GLUCOSE BLDC GLUCOMTR-MCNC: 96 MG/DL — SIGNIFICANT CHANGE UP (ref 70–99)
GLUCOSE SERPL-MCNC: 144 MG/DL — HIGH (ref 70–99)
GLUCOSE SERPL-MCNC: 144 MG/DL — HIGH (ref 70–99)
GRAM STN WND: SIGNIFICANT CHANGE UP
GRAM STN WND: SIGNIFICANT CHANGE UP
HCT VFR BLD CALC: 26.2 % — LOW (ref 39–50)
HGB BLD-MCNC: 8.3 G/DL — LOW (ref 13–17)
INR BLD: 1.1 — SIGNIFICANT CHANGE UP (ref 0.88–1.17)
MAGNESIUM SERPL-MCNC: 2 MG/DL — SIGNIFICANT CHANGE UP (ref 1.6–2.6)
MCHC RBC-ENTMCNC: 30.9 PG — SIGNIFICANT CHANGE UP (ref 27–34)
MCHC RBC-ENTMCNC: 31.7 % — LOW (ref 32–36)
MCV RBC AUTO: 97.4 FL — SIGNIFICANT CHANGE UP (ref 80–100)
NRBC # FLD: 0 K/UL — SIGNIFICANT CHANGE UP (ref 0–0)
PHOSPHATE SERPL-MCNC: 2.8 MG/DL — SIGNIFICANT CHANGE UP (ref 2.5–4.5)
PLATELET # BLD AUTO: 104 K/UL — LOW (ref 150–400)
PMV BLD: 12 FL — SIGNIFICANT CHANGE UP (ref 7–13)
POTASSIUM SERPL-MCNC: 4.4 MMOL/L — SIGNIFICANT CHANGE UP (ref 3.5–5.3)
POTASSIUM SERPL-MCNC: 4.4 MMOL/L — SIGNIFICANT CHANGE UP (ref 3.5–5.3)
POTASSIUM SERPL-SCNC: 4.4 MMOL/L — SIGNIFICANT CHANGE UP (ref 3.5–5.3)
POTASSIUM SERPL-SCNC: 4.4 MMOL/L — SIGNIFICANT CHANGE UP (ref 3.5–5.3)
PROT SERPL-MCNC: 7.6 G/DL — SIGNIFICANT CHANGE UP (ref 6–8.3)
PROTHROM AB SERPL-ACNC: 12.2 SEC — SIGNIFICANT CHANGE UP (ref 9.8–13.1)
RBC # BLD: 2.69 M/UL — LOW (ref 4.2–5.8)
RBC # FLD: 13.9 % — SIGNIFICANT CHANGE UP (ref 10.3–14.5)
RH IG SCN BLD-IMP: POSITIVE — SIGNIFICANT CHANGE UP
SODIUM SERPL-SCNC: 141 MMOL/L — SIGNIFICANT CHANGE UP (ref 135–145)
SODIUM SERPL-SCNC: 141 MMOL/L — SIGNIFICANT CHANGE UP (ref 135–145)
SPECIMEN SOURCE: SIGNIFICANT CHANGE UP
SPECIMEN SOURCE: SIGNIFICANT CHANGE UP
WBC # BLD: 5.3 K/UL — SIGNIFICANT CHANGE UP (ref 3.8–10.5)
WBC # FLD AUTO: 5.3 K/UL — SIGNIFICANT CHANGE UP (ref 3.8–10.5)

## 2019-08-08 PROCEDURE — 88305 TISSUE EXAM BY PATHOLOGIST: CPT | Mod: 26

## 2019-08-08 PROCEDURE — 99233 SBSQ HOSP IP/OBS HIGH 50: CPT

## 2019-08-08 PROCEDURE — 73630 X-RAY EXAM OF FOOT: CPT | Mod: 26,50

## 2019-08-08 PROCEDURE — 88304 TISSUE EXAM BY PATHOLOGIST: CPT | Mod: 26

## 2019-08-08 PROCEDURE — 88312 SPECIAL STAINS GROUP 1: CPT | Mod: 26

## 2019-08-08 PROCEDURE — 99232 SBSQ HOSP IP/OBS MODERATE 35: CPT | Mod: GC

## 2019-08-08 PROCEDURE — 88311 DECALCIFY TISSUE: CPT | Mod: 26

## 2019-08-08 RX ORDER — HYDROMORPHONE HYDROCHLORIDE 2 MG/ML
0.5 INJECTION INTRAMUSCULAR; INTRAVENOUS; SUBCUTANEOUS EVERY 4 HOURS
Refills: 0 | Status: DISCONTINUED | OUTPATIENT
Start: 2019-08-08 | End: 2019-08-08

## 2019-08-08 RX ORDER — OXYCODONE AND ACETAMINOPHEN 5; 325 MG/1; MG/1
1 TABLET ORAL EVERY 4 HOURS
Refills: 0 | Status: DISCONTINUED | OUTPATIENT
Start: 2019-08-08 | End: 2019-08-10

## 2019-08-08 RX ORDER — SODIUM CHLORIDE 9 MG/ML
1000 INJECTION INTRAMUSCULAR; INTRAVENOUS; SUBCUTANEOUS
Refills: 0 | Status: DISCONTINUED | OUTPATIENT
Start: 2019-08-08 | End: 2019-08-09

## 2019-08-08 RX ORDER — ACETAMINOPHEN 500 MG
650 TABLET ORAL EVERY 6 HOURS
Refills: 0 | Status: DISCONTINUED | OUTPATIENT
Start: 2019-08-08 | End: 2019-08-19

## 2019-08-08 RX ADMIN — FAMOTIDINE 40 MILLIGRAM(S): 10 INJECTION INTRAVENOUS at 21:59

## 2019-08-08 RX ADMIN — Medication 100 MILLIGRAM(S): at 21:58

## 2019-08-08 RX ADMIN — TAMSULOSIN HYDROCHLORIDE 0.4 MILLIGRAM(S): 0.4 CAPSULE ORAL at 21:58

## 2019-08-08 RX ADMIN — SENNA PLUS 2 TABLET(S): 8.6 TABLET ORAL at 21:59

## 2019-08-08 RX ADMIN — INSULIN GLARGINE 30 UNIT(S): 100 INJECTION, SOLUTION SUBCUTANEOUS at 21:57

## 2019-08-08 RX ADMIN — Medication 100 MILLIGRAM(S): at 05:09

## 2019-08-08 RX ADMIN — SIMVASTATIN 5 MILLIGRAM(S): 20 TABLET, FILM COATED ORAL at 21:58

## 2019-08-08 RX ADMIN — PIPERACILLIN AND TAZOBACTAM 25 GRAM(S): 4; .5 INJECTION, POWDER, LYOPHILIZED, FOR SOLUTION INTRAVENOUS at 21:57

## 2019-08-08 RX ADMIN — SODIUM CHLORIDE 40 MILLILITER(S): 9 INJECTION INTRAMUSCULAR; INTRAVENOUS; SUBCUTANEOUS at 21:57

## 2019-08-08 RX ADMIN — CHLORHEXIDINE GLUCONATE 1 APPLICATION(S): 213 SOLUTION TOPICAL at 21:58

## 2019-08-08 RX ADMIN — PIPERACILLIN AND TAZOBACTAM 25 GRAM(S): 4; .5 INJECTION, POWDER, LYOPHILIZED, FOR SOLUTION INTRAVENOUS at 05:09

## 2019-08-08 NOTE — PROGRESS NOTE ADULT - SUBJECTIVE AND OBJECTIVE BOX
Podiatry Pager #: [phone]    Patient is a 63y old  Male who presents with a chief complaint of OM (07 Aug 2019 13:47)      INTERVAL HPI/OVERNIGHT EVENTS:   Pt is scheduled for Left foot revisional TMA & LOLA, Right foot hallux bone biopsy with Dr. Lafleur at 4:30PM. Patient is aware of procedure and is NPO since midnight.    MEDICATIONS  (STANDING):  chlorhexidine 4% Liquid 1 Application(s) Topical daily  dextrose 5%. 1000 milliLiter(s) (50 mL/Hr) IV Continuous <Continuous>  dextrose 50% Injectable 12.5 Gram(s) IV Push once  dextrose 50% Injectable 25 Gram(s) IV Push once  dextrose 50% Injectable 25 Gram(s) IV Push once  docusate sodium 100 milliGRAM(s) Oral three times a day  famotidine    Tablet 40 milliGRAM(s) Oral at bedtime  insulin glargine Injectable (LANTUS) 30 Unit(s) SubCutaneous at bedtime  insulin lispro (HumaLOG) corrective regimen sliding scale   SubCutaneous three times a day before meals  insulin lispro Injectable (HumaLOG) 4 Unit(s) SubCutaneous three times a day before meals  piperacillin/tazobactam IVPB.. 3.375 Gram(s) IV Intermittent every 8 hours  polyethylene glycol 3350 17 Gram(s) Oral daily  senna 2 Tablet(s) Oral at bedtime  simvastatin 5 milliGRAM(s) Oral at bedtime  sodium chloride 0.9%. 1000 milliLiter(s) (40 mL/Hr) IV Continuous <Continuous>  tamsulosin 0.4 milliGRAM(s) Oral at bedtime    MEDICATIONS  (PRN):  dextrose 40% Gel 15 Gram(s) Oral once PRN Blood Glucose LESS THAN 70 milliGRAM(s)/deciliter  glucagon  Injectable 1 milliGRAM(s) IntraMuscular once PRN Glucose LESS THAN 70 milligrams/deciliter      Allergies    No Known Allergies    Intolerances        Vital Signs Last 24 Hrs  T(C): 37 (08 Aug 2019 04:33), Max: 37.1 (07 Aug 2019 21:40)  T(F): 98.6 (08 Aug 2019 04:33), Max: 98.8 (07 Aug 2019 21:40)  HR: 79 (08 Aug 2019 04:33) (79 - 84)  BP: 140/70 (08 Aug 2019 04:33) (140/70 - 165/82)  BP(mean): --  RR: 18 (08 Aug 2019 04:33) (17 - 18)  SpO2: 97% (08 Aug 2019 04:33) (97% - 99%)    LABS:                        8.3    5.30  )-----------( 104      ( 08 Aug 2019 05:12 )             26.2     08-08    141  |  103  |  11  ----------------------------<  144<H>  4.4   |  27  |  1.36<H>    Ca    9.2      08 Aug 2019 05:12  Phos  2.8     08-08  Mg     2.0     08-08    TPro  7.6  /  Alb  3.8  /  TBili  0.4  /  DBili  x   /  AST  17  /  ALT  31  /  AlkPhos  156<H>  08-08    PT/INR - ( 08 Aug 2019 05:12 )   PT: 12.2 SEC;   INR: 1.10          PTT - ( 08 Aug 2019 05:12 )  PTT:29.8 SEC    CAPILLARY BLOOD GLUCOSE      POCT Blood Glucose.: 117 mg/dL (08 Aug 2019 07:54)  POCT Blood Glucose.: 166 mg/dL (07 Aug 2019 21:14)  POCT Blood Glucose.: 175 mg/dL (07 Aug 2019 17:19)  POCT Blood Glucose.: 123 mg/dL (07 Aug 2019 12:37)

## 2019-08-08 NOTE — PROGRESS NOTE ADULT - SUBJECTIVE AND OBJECTIVE BOX
Patient is a 63y old  Male who presents with a chief complaint of fever    This 64 y/o man has extensive small cell lung cancer, metastatic to bone, and brain. Patient recently emigrated to the [place] from [facility] 9/2018 diagnosed 12/2018 with small cell carcinoma s/p Etoposide/Carboplatin 3/8/19, 4/2019-brain MRI showed a new tiny enhancing lesion in the left frontal subcortical region compatible with metastasis. Patient underwent gamma knife radiosurgery, completing 5/2019. 4/2019-Began Nivolumab, 7/2019-CT scan chest/abdomen/pelvis-new left upper lobe satellite nodules with increased left mediastinal and left hilar adenopathy, consistent with disease progression. Interval pathologic fracture of right posterior sixth rib, otherwise stable osseous metastases.  In the ED patient was found to be normotensive, satting 100% on RA, afebrile. Labs notable fro a leukocytosis of 11.92, platelets of 71, previously 107. CXR neg, UA neg. Given IV vanco, zosyn, admitted for further management.  W/U revealed osteomyelitis. He is on Abx and due for surgery today after recovery from most recent chemoRx.     PAST MEDICAL & SURGICAL HISTORY:  Lung cancer  Hyperlipidemia  Diabetes mellitus  Hypertension  History of foot surgery    Review of Systems:   CONSTITUTIONAL: + fever, thin, no sig fatigue  EYES: No eye pain, visual disturbances, or discharge  ENMT:  No difficulty hearing, tinnitus, vertigo; No sinus or throat pain  NECK: No pain or stiffness  BREASTS: No pain, masses, or nipple discharge  RESPIRATORY: No cough, wheezing, chills or hemoptysis; No shortness of breath  CARDIOVASCULAR: No chest pain, palpitations, dizziness, or leg swelling  GASTROINTESTINAL: No abdominal or epigastric pain. No nausea, vomiting, or hematemesis; No diarrhea or constipation. No melena or hematochezia.  GENITOURINARY: No dysuria, frequency, hematuria, or incontinence  NEUROLOGICAL: No headaches, memory loss, loss of strength, numbness, or tremors  SKIN: No itching, burning, rashes, or lesions   LYMPH NODES: No enlarged glands  ENDOCRINE: No heat or cold intolerance; No hair loss  MUSCULOSKELETAL: No joint pain or swelling; No muscle, back, or extremity pain  PSYCHIATRIC: No depression, anxiety, mood swings, or difficulty sleeping  HEME/LYMPH: No easy bruising, or bleeding gums  ALLERGY AND IMMUNOLOGIC: No hives or eczema      No Known Allergies      Social History:   Former smoker    · 1PPD x 14 years-stopped in his 40's  Four children   · 3 living children-daughter helps in patient care; 1 son in [place] and 1 son in [place]    No alcohol use  No illicit drug use  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 1000 milliLiter(s) (50 mL/Hr) IV Continuous <Continuous>  dextrose 50% Injectable 12.5 Gram(s) IV Push once  dextrose 50% Injectable 25 Gram(s) IV Push once  dextrose 50% Injectable 25 Gram(s) IV Push once  docusate sodium 100 milliGRAM(s) Oral three times a day  famotidine    Tablet 40 milliGRAM(s) Oral at bedtime  insulin glargine Injectable (LANTUS) 30 Unit(s) SubCutaneous at bedtime  insulin lispro (HumaLOG) corrective regimen sliding scale   SubCutaneous three times a day before meals  insulin lispro Injectable (HumaLOG) 4 Unit(s) SubCutaneous three times a day before meals  piperacillin/tazobactam IVPB.. 3.375 Gram(s) IV Intermittent every 8 hours  polyethylene glycol 3350 17 Gram(s) Oral daily  senna 2 Tablet(s) Oral at bedtime  simvastatin 5 milliGRAM(s) Oral at bedtime  sodium chloride 0.9%. 1000 milliLiter(s) (40 mL/Hr) IV Continuous <Continuous>  tamsulosin 0.4 milliGRAM(s) Oral at bedtime    MEDICATIONS  (PRN):  dextrose 40% Gel 15 Gram(s) Oral once PRN Blood Glucose LESS THAN 70 milliGRAM(s)/deciliter  glucagon  Injectable 1 milliGRAM(s) IntraMuscular once PRN Glucose LESS THAN 70 milligrams/deciliter    Vital Signs Last 24 Hrs  T(C): 36.7 (08 Aug 2019 13:43), Max: 37.1 (07 Aug 2019 21:40)  T(F): 98 (08 Aug 2019 13:43), Max: 98.8 (07 Aug 2019 21:40)  HR: 75 (08 Aug 2019 13:43) (75 - 84)  BP: 175/76 (08 Aug 2019 13:43) (147/69 - 175/76)  BP(mean): --  RR: 18 (08 Aug 2019 13:43) (17 - 18)  SpO2: 99% (08 Aug 2019 13:43) (96% - 99%)    PHYSICAL EXAM:  GENERAL: NAD, thin appears ill, cooperative with exam  HEAD:  Atraumatic, Normocephalic  EYES: EOMI, PERRLA, conjunctiva and sclera clear  NECK: Supple, No elevated JVD  CHEST/LUNG: Diminished at bases; No wheeze  HEART: Regular rate and rhythm; No murmurs, rubs, or gallops  ABDOMEN: Soft, Nontender, Nondistended; Bowel sounds present  EXTREMITIES:  dc foot ulcer on left, serosanguinous discharge  PSYCH: AAOx3  NEUROLOGY: CN II-XII grossly intact, moving all extremities  SKIN: No rashes or lesions    LABS:                          8.3    5.30  )-----------( 104      ( 08 Aug 2019 05:12 )             26.2     08-08    141  |  103  |  11  ----------------------------<  144<H>  4.4   |  27  |  1.36<H>    Ca    9.2      08 Aug 2019 05:12  Phos  2.8     08-08  Mg     2.0     08-08    TPro  7.6  /  Alb  3.8  /  TBili  0.4  /  DBili  x   /  AST  17  /  ALT  31  /  AlkPhos  156<H>  08-08    RADIOLOGY & ADDITIONAL TESTS: no new tests

## 2019-08-08 NOTE — BRIEF OPERATIVE NOTE - NSICDXBRIEFPREOP_GEN_ALL_CORE_FT
PRE-OP DIAGNOSIS:  Gastrocnemius equinus, left 08-Aug-2019 17:10:16  Osbaldo Sanchez  Osteomyelitis of great toe of right foot 08-Aug-2019 17:10:05  Osbaldo Sanchez  Acute osteomyelitis of metatarsal bone of left foot 08-Aug-2019 17:09:53  Osbaldo Sanchez

## 2019-08-08 NOTE — BRIEF OPERATIVE NOTE - NSICDXBRIEFPROCEDURE_GEN_ALL_CORE_FT
PROCEDURES:  Lengthening of left Achilles tendon 08-Aug-2019 17:08:03 Left Percutaneous Osbaldo Knox  Revision, transmetatarsal amputation 08-Aug-2019 17:07:26 Revisional left transmetatarsal amputation with percutaneous Osbaldo Knox

## 2019-08-08 NOTE — PROGRESS NOTE ADULT - PROBLEM SELECTOR PLAN 5
Likely myelosuppression from Gemzar, recently given on last week   - continue to monitor CBC  - Holding AC as plts < 50   - PLTs in georgie from chemo, s/p 1U PLTs with improvement to 104 today

## 2019-08-08 NOTE — PROGRESS NOTE ADULT - SUBJECTIVE AND OBJECTIVE BOX
Patient is a 63y old  Male who presents with a chief complaint of fever, osteomyelitis, small cell lung cancer, metastatic to bone and brain (08 Aug 2019 13:46)      Subjective:  [place]  # [id number]. Patient feels well. S/p 1U plts overnight with good response. Denies chest pain or SOB.      MEDICATIONS  (STANDING):  chlorhexidine 4% Liquid 1 Application(s) Topical daily  dextrose 5%. 1000 milliLiter(s) (50 mL/Hr) IV Continuous <Continuous>  dextrose 50% Injectable 12.5 Gram(s) IV Push once  dextrose 50% Injectable 25 Gram(s) IV Push once  dextrose 50% Injectable 25 Gram(s) IV Push once  docusate sodium 100 milliGRAM(s) Oral three times a day  famotidine    Tablet 40 milliGRAM(s) Oral at bedtime  insulin glargine Injectable (LANTUS) 30 Unit(s) SubCutaneous at bedtime  insulin lispro (HumaLOG) corrective regimen sliding scale   SubCutaneous three times a day before meals  insulin lispro Injectable (HumaLOG) 4 Unit(s) SubCutaneous three times a day before meals  piperacillin/tazobactam IVPB.. 3.375 Gram(s) IV Intermittent every 8 hours  polyethylene glycol 3350 17 Gram(s) Oral daily  senna 2 Tablet(s) Oral at bedtime  simvastatin 5 milliGRAM(s) Oral at bedtime  sodium chloride 0.9%. 1000 milliLiter(s) (40 mL/Hr) IV Continuous <Continuous>  tamsulosin 0.4 milliGRAM(s) Oral at bedtime    MEDICATIONS  (PRN):  dextrose 40% Gel 15 Gram(s) Oral once PRN Blood Glucose LESS THAN 70 milliGRAM(s)/deciliter  glucagon  Injectable 1 milliGRAM(s) IntraMuscular once PRN Glucose LESS THAN 70 milligrams/deciliter        Objective:    Vitals: Vital Signs Last 24 Hrs  T(C): 36.7 (08-08-19 @ 13:43), Max: 37.1 (08-07-19 @ 21:40)  T(F): 98 (08-08-19 @ 13:43), Max: 98.8 (08-07-19 @ 21:40)  HR: 75 (08-08-19 @ 13:43) (75 - 84)  BP: 175/76 (08-08-19 @ 13:43) (147/69 - 175/76)  BP(mean): --  RR: 18 (08-08-19 @ 13:43) (17 - 18)  SpO2: 99% (08-08-19 @ 13:43) (96% - 99%)            I&O's Summary      PHYSICAL EXAM:  GENERAL: NAD, well-groomed, well-developed  HEAD:  Atraumatic, Normocephalic  CHEST/LUNG: Clear to percussion bilaterally; No rales, rhonchi, wheezing, or rubs  HEART: Regular rate and rhythm; No murmurs, rubs, or gallops  ABDOMEN: Soft, Nontender, Nondistended; Bowel sounds present  EXTREMITIES:  L foot s/p TMA dressed, R foot dressed   NERVOUS SYSTEM:  Alert & Oriented X3, Good concentration    LABS:  08-08    141  |  103  |  11  ----------------------------<  144<H>  4.4   |  27  |  1.36<H>  08-07    139  |  100  |  11  ----------------------------<  134<H>  3.8   |  28  |  1.26  08-06    138  |  100  |  10  ----------------------------<  141<H>  3.7   |  28  |  1.20    Ca    9.2      08 Aug 2019 05:12  Ca    8.9      07 Aug 2019 05:33  Ca    8.9      06 Aug 2019 07:01  Phos  2.8     08-08  Mg     2.0     08-08    TPro  7.6  /  Alb  3.8  /  TBili  0.4  /  DBili  x   /  AST  17  /  ALT  31  /  AlkPhos  156<H>  08-08  TPro  6.9  /  Alb  3.2<L>  /  TBili  0.4  /  DBili  x   /  AST  18  /  ALT  33  /  AlkPhos  164<H>  08-07  TPro  6.9  /  Alb  3.0<L>  /  TBili  0.4  /  DBili  x   /  AST  23  /  ALT  44<H>  /  AlkPhos  176<H>  08-06      PT/INR - ( 08 Aug 2019 05:12 )   PT: 12.2 SEC;   INR: 1.10          PTT - ( 08 Aug 2019 05:12 )  PTT:29.8 SEC                                        8.3    5.30  )-----------( 104      ( 08 Aug 2019 05:12 )             26.2                         7.8    4.15  )-----------( 46       ( 07 Aug 2019 05:33 )             24.1                         8.1    3.62  )-----------( 41       ( 06 Aug 2019 07:01 )             24.5     CAPILLARY BLOOD GLUCOSE      POCT Blood Glucose.: 96 mg/dL (08 Aug 2019 11:51)  POCT Blood Glucose.: 117 mg/dL (08 Aug 2019 07:54)  POCT Blood Glucose.: 166 mg/dL (07 Aug 2019 21:14)  POCT Blood Glucose.: 175 mg/dL (07 Aug 2019 17:19)      RADIOLOGY & ADDITIONAL TESTS:    Imaging Personally Reviewed:  [ ] YES  [ ] NO      Consultants involved in case:   Consultant(s) Notes Reviewed:  [ ] YES  [ ] NO:   Care Discussed with Consultants/Other Providers [ ] YES  [ ] NO Patient is a 63y old  Male who presents with a chief complaint of fever, osteomyelitis, small cell lung cancer, metastatic to bone and brain (08 Aug 2019 13:46)      Subjective:  Faroese  # [id number]. Patient feels well. S/p 1U plts overnight with good response. Denies chest pain or SOB.      MEDICATIONS  (STANDING):  chlorhexidine 4% Liquid 1 Application(s) Topical daily  dextrose 5%. 1000 milliLiter(s) (50 mL/Hr) IV Continuous <Continuous>  dextrose 50% Injectable 12.5 Gram(s) IV Push once  dextrose 50% Injectable 25 Gram(s) IV Push once  dextrose 50% Injectable 25 Gram(s) IV Push once  docusate sodium 100 milliGRAM(s) Oral three times a day  famotidine    Tablet 40 milliGRAM(s) Oral at bedtime  insulin glargine Injectable (LANTUS) 30 Unit(s) SubCutaneous at bedtime  insulin lispro (HumaLOG) corrective regimen sliding scale   SubCutaneous three times a day before meals  insulin lispro Injectable (HumaLOG) 4 Unit(s) SubCutaneous three times a day before meals  piperacillin/tazobactam IVPB.. 3.375 Gram(s) IV Intermittent every 8 hours  polyethylene glycol 3350 17 Gram(s) Oral daily  senna 2 Tablet(s) Oral at bedtime  simvastatin 5 milliGRAM(s) Oral at bedtime  sodium chloride 0.9%. 1000 milliLiter(s) (40 mL/Hr) IV Continuous <Continuous>  tamsulosin 0.4 milliGRAM(s) Oral at bedtime    MEDICATIONS  (PRN):  dextrose 40% Gel 15 Gram(s) Oral once PRN Blood Glucose LESS THAN 70 milliGRAM(s)/deciliter  glucagon  Injectable 1 milliGRAM(s) IntraMuscular once PRN Glucose LESS THAN 70 milligrams/deciliter        Objective:    Vitals: Vital Signs Last 24 Hrs  T(C): 36.7 (08-08-19 @ 13:43), Max: 37.1 (08-07-19 @ 21:40)  T(F): 98 (08-08-19 @ 13:43), Max: 98.8 (08-07-19 @ 21:40)  HR: 75 (08-08-19 @ 13:43) (75 - 84)  BP: 175/76 (08-08-19 @ 13:43) (147/69 - 175/76)  BP(mean): --  RR: 18 (08-08-19 @ 13:43) (17 - 18)  SpO2: 99% (08-08-19 @ 13:43) (96% - 99%)            I&O's Summary      PHYSICAL EXAM:  GENERAL: NAD, well-groomed, well-developed  HEAD:  Atraumatic, Normocephalic  CHEST/LUNG: Clear to percussion bilaterally; No rales, rhonchi, wheezing, or rubs  HEART: Regular rate and rhythm; No murmurs, rubs, or gallops  ABDOMEN: Soft, Nontender, Nondistended; Bowel sounds present  EXTREMITIES:  L foot s/p TMA dressed, R foot dressed   NERVOUS SYSTEM:  Alert & Oriented X3, Good concentration    LABS:  08-08    141  |  103  |  11  ----------------------------<  144<H>  4.4   |  27  |  1.36<H>  08-07    139  |  100  |  11  ----------------------------<  134<H>  3.8   |  28  |  1.26  08-06    138  |  100  |  10  ----------------------------<  141<H>  3.7   |  28  |  1.20    Ca    9.2      08 Aug 2019 05:12  Ca    8.9      07 Aug 2019 05:33  Ca    8.9      06 Aug 2019 07:01  Phos  2.8     08-08  Mg     2.0     08-08    TPro  7.6  /  Alb  3.8  /  TBili  0.4  /  DBili  x   /  AST  17  /  ALT  31  /  AlkPhos  156<H>  08-08  TPro  6.9  /  Alb  3.2<L>  /  TBili  0.4  /  DBili  x   /  AST  18  /  ALT  33  /  AlkPhos  164<H>  08-07  TPro  6.9  /  Alb  3.0<L>  /  TBili  0.4  /  DBili  x   /  AST  23  /  ALT  44<H>  /  AlkPhos  176<H>  08-06      PT/INR - ( 08 Aug 2019 05:12 )   PT: 12.2 SEC;   INR: 1.10          PTT - ( 08 Aug 2019 05:12 )  PTT:29.8 SEC                                        8.3    5.30  )-----------( 104      ( 08 Aug 2019 05:12 )             26.2                         7.8    4.15  )-----------( 46       ( 07 Aug 2019 05:33 )             24.1                         8.1    3.62  )-----------( 41       ( 06 Aug 2019 07:01 )             24.5     CAPILLARY BLOOD GLUCOSE      POCT Blood Glucose.: 96 mg/dL (08 Aug 2019 11:51)  POCT Blood Glucose.: 117 mg/dL (08 Aug 2019 07:54)  POCT Blood Glucose.: 166 mg/dL (07 Aug 2019 21:14)  POCT Blood Glucose.: 175 mg/dL (07 Aug 2019 17:19)      RADIOLOGY & ADDITIONAL TESTS:    Imaging Personally Reviewed:  [X ] YES  [ ] NO      Consultants involved in case:   Consultant(s) Notes Reviewed:  [ X] YES  [ ] NO:   Care Discussed with Consultants/Other Providers [ X] YES  Dr. Zheng (onc)

## 2019-08-08 NOTE — PROGRESS NOTE ADULT - ASSESSMENT
63M with extensive stage SCLC, s/p progression on carbo/etop and nivolumab, started on single agent gemcitabine, s/p GK to brain lesion, presenting with fever, mild leukocytosis with left shift. Osteomyelitis as a complication of a chronic diabetic foot ulcer, has had this for more than 1 year.     -did well with Txf of PLTs, allowing surgery today.   -Monitor fever curve-Monitor hgb, transfuse as needed  -Monitor liver tests and Cr  -Continue plan for surgery    Will follow. Please do not hesitate to call back with questions.     LEAH Zheng MD PhD  Medical Oncology  CELL #  [phone]

## 2019-08-08 NOTE — PROGRESS NOTE ADULT - ASSESSMENT
Plan:   To OR today at 4:30 with Dr. Lafleur for left foot revisional TMA & LOLA, Right foot hallux bone biopsy.   CXR on sunrise.  EKG on sunrise.  Medical clearance since 8/7 and documented in chart.  Consent signed and in chart.  Procedure was explained to patient in detail. All alternatives, risks and complications were discussed. All questions answered.

## 2019-08-08 NOTE — BRIEF OPERATIVE NOTE - OPERATION/FINDINGS
Left foot wound at level of central metatarsal with necrotic soft tissue  Hard bone of the residual central metatarsals and 1st and 5th of the left foot; low to moderate concern for infection  Right Hallux distal phalanx and proximal phalanx hard when Bone Biopsy performed  Adequate bleeding; low concern for viability

## 2019-08-08 NOTE — BRIEF OPERATIVE NOTE - SPECIMENS
1. Dirty left central foot ulcer sharply excised with necrotis skin, soft tissue and central metatarsal bone for pathology 2. Clean bone margin from left central metatarsal bone for pathology 3. Clean bone margin from left central metatarsal bone for culture. 4. Right Dirty Hallux bone for pathology 5. Right Dirty Hallux bone for culture.

## 2019-08-08 NOTE — PROGRESS NOTE ADULT - PROBLEM SELECTOR PLAN 1
MRI findings with "Soft tissue ulceration over lateral plantar aspect of the TMA stump with evidence for osteomyelitis involving the underlying 4th and 5th   metatarsal margins. The 4th and 5th metatarsal stumps are also noted to   be longer relative to the remaining metatarsal stump remnants suggesting an aggressive edge."  - OR today for Left foot revisional TMA w/ PT/Achilles tenotomy and Right foot Hallux Bone Biopsy   - C/w Zosyn per ID recs

## 2019-08-08 NOTE — BRIEF OPERATIVE NOTE - NSICDXBRIEFPOSTOP_GEN_ALL_CORE_FT
POST-OP DIAGNOSIS:  Gastrocnemius equinus, left 08-Aug-2019 17:10:50  Osbaldo Sanchez  Osteomyelitis of great toe of right foot 08-Aug-2019 17:10:44  Osbaldo Sanchez  Acute osteomyelitis of metatarsal bone, left 08-Aug-2019 17:10:36  Osbaldo Sanchez

## 2019-08-08 NOTE — BRIEF OPERATIVE NOTE - COMMENTS
Pulse lavage 3000cc sterile saline mix with bacitracin  Percutaneous Left achilles tendon lengthening with 2 medial stab incisions and 1 lateral closed with 3.0 nylon sutures.  Left LLE applied posterior splint  Right Hallux bone biopsy performed through distal stab incision and sterile Igor Lock/Bone marrow needle and closed with 3.0 sutures.  Revisional Left foot TMA closed with penrose drain through isolated stab incision dorsally.

## 2019-08-09 LAB
ANION GAP SERPL CALC-SCNC: 12 MMO/L — SIGNIFICANT CHANGE UP (ref 7–14)
ANION GAP SERPL CALC-SCNC: 14 MMO/L — SIGNIFICANT CHANGE UP (ref 7–14)
BUN SERPL-MCNC: 12 MG/DL — SIGNIFICANT CHANGE UP (ref 7–23)
BUN SERPL-MCNC: 9 MG/DL — SIGNIFICANT CHANGE UP (ref 7–23)
CALCIUM SERPL-MCNC: 8.8 MG/DL — SIGNIFICANT CHANGE UP (ref 8.4–10.5)
CALCIUM SERPL-MCNC: 9 MG/DL — SIGNIFICANT CHANGE UP (ref 8.4–10.5)
CHLORIDE SERPL-SCNC: 98 MMOL/L — SIGNIFICANT CHANGE UP (ref 98–107)
CHLORIDE SERPL-SCNC: 99 MMOL/L — SIGNIFICANT CHANGE UP (ref 98–107)
CO2 SERPL-SCNC: 25 MMOL/L — SIGNIFICANT CHANGE UP (ref 22–31)
CO2 SERPL-SCNC: 26 MMOL/L — SIGNIFICANT CHANGE UP (ref 22–31)
CREAT SERPL-MCNC: 1.24 MG/DL — SIGNIFICANT CHANGE UP (ref 0.5–1.3)
CREAT SERPL-MCNC: 1.39 MG/DL — HIGH (ref 0.5–1.3)
GLUCOSE BLDC GLUCOMTR-MCNC: 155 MG/DL — HIGH (ref 70–99)
GLUCOSE BLDC GLUCOMTR-MCNC: 169 MG/DL — HIGH (ref 70–99)
GLUCOSE BLDC GLUCOMTR-MCNC: 176 MG/DL — HIGH (ref 70–99)
GLUCOSE BLDC GLUCOMTR-MCNC: 235 MG/DL — HIGH (ref 70–99)
GLUCOSE BLDC GLUCOMTR-MCNC: 256 MG/DL — HIGH (ref 70–99)
GLUCOSE SERPL-MCNC: 138 MG/DL — HIGH (ref 70–99)
GLUCOSE SERPL-MCNC: 172 MG/DL — HIGH (ref 70–99)
HCT VFR BLD CALC: 20.6 % — CRITICAL LOW (ref 39–50)
HCT VFR BLD CALC: 24.1 % — LOW (ref 39–50)
HGB BLD-MCNC: 6.6 G/DL — CRITICAL LOW (ref 13–17)
HGB BLD-MCNC: 8 G/DL — LOW (ref 13–17)
MCHC RBC-ENTMCNC: 31 PG — SIGNIFICANT CHANGE UP (ref 27–34)
MCHC RBC-ENTMCNC: 31.1 PG — SIGNIFICANT CHANGE UP (ref 27–34)
MCHC RBC-ENTMCNC: 32 % — SIGNIFICANT CHANGE UP (ref 32–36)
MCHC RBC-ENTMCNC: 33.2 % — SIGNIFICANT CHANGE UP (ref 32–36)
MCV RBC AUTO: 93.4 FL — SIGNIFICANT CHANGE UP (ref 80–100)
MCV RBC AUTO: 97.2 FL — SIGNIFICANT CHANGE UP (ref 80–100)
NRBC # FLD: 0 K/UL — SIGNIFICANT CHANGE UP (ref 0–0)
NRBC # FLD: 0.02 K/UL — SIGNIFICANT CHANGE UP (ref 0–0)
PLATELET # BLD AUTO: 102 K/UL — LOW (ref 150–400)
PLATELET # BLD AUTO: 116 K/UL — LOW (ref 150–400)
PMV BLD: 10 FL — SIGNIFICANT CHANGE UP (ref 7–13)
PMV BLD: 11.7 FL — SIGNIFICANT CHANGE UP (ref 7–13)
POTASSIUM SERPL-MCNC: 3.8 MMOL/L — SIGNIFICANT CHANGE UP (ref 3.5–5.3)
POTASSIUM SERPL-MCNC: 4.1 MMOL/L — SIGNIFICANT CHANGE UP (ref 3.5–5.3)
POTASSIUM SERPL-SCNC: 3.8 MMOL/L — SIGNIFICANT CHANGE UP (ref 3.5–5.3)
POTASSIUM SERPL-SCNC: 4.1 MMOL/L — SIGNIFICANT CHANGE UP (ref 3.5–5.3)
RBC # BLD: 2.12 M/UL — LOW (ref 4.2–5.8)
RBC # BLD: 2.58 M/UL — LOW (ref 4.2–5.8)
RBC # FLD: 14.3 % — SIGNIFICANT CHANGE UP (ref 10.3–14.5)
RBC # FLD: 14.6 % — HIGH (ref 10.3–14.5)
SODIUM SERPL-SCNC: 136 MMOL/L — SIGNIFICANT CHANGE UP (ref 135–145)
SODIUM SERPL-SCNC: 138 MMOL/L — SIGNIFICANT CHANGE UP (ref 135–145)
WBC # BLD: 6.7 K/UL — SIGNIFICANT CHANGE UP (ref 3.8–10.5)
WBC # BLD: 6.96 K/UL — SIGNIFICANT CHANGE UP (ref 3.8–10.5)
WBC # FLD AUTO: 6.7 K/UL — SIGNIFICANT CHANGE UP (ref 3.8–10.5)
WBC # FLD AUTO: 6.96 K/UL — SIGNIFICANT CHANGE UP (ref 3.8–10.5)

## 2019-08-09 PROCEDURE — 99233 SBSQ HOSP IP/OBS HIGH 50: CPT

## 2019-08-09 PROCEDURE — 99232 SBSQ HOSP IP/OBS MODERATE 35: CPT

## 2019-08-09 RX ADMIN — SENNA PLUS 2 TABLET(S): 8.6 TABLET ORAL at 21:35

## 2019-08-09 RX ADMIN — Medication 100 MILLIGRAM(S): at 05:30

## 2019-08-09 RX ADMIN — Medication 3: at 17:34

## 2019-08-09 RX ADMIN — PIPERACILLIN AND TAZOBACTAM 25 GRAM(S): 4; .5 INJECTION, POWDER, LYOPHILIZED, FOR SOLUTION INTRAVENOUS at 13:00

## 2019-08-09 RX ADMIN — Medication 1: at 08:37

## 2019-08-09 RX ADMIN — FAMOTIDINE 40 MILLIGRAM(S): 10 INJECTION INTRAVENOUS at 21:35

## 2019-08-09 RX ADMIN — Medication 4 UNIT(S): at 08:37

## 2019-08-09 RX ADMIN — Medication 4 UNIT(S): at 12:39

## 2019-08-09 RX ADMIN — INSULIN GLARGINE 30 UNIT(S): 100 INJECTION, SOLUTION SUBCUTANEOUS at 21:35

## 2019-08-09 RX ADMIN — POLYETHYLENE GLYCOL 3350 17 GRAM(S): 17 POWDER, FOR SOLUTION ORAL at 12:39

## 2019-08-09 RX ADMIN — Medication 1: at 12:38

## 2019-08-09 RX ADMIN — SIMVASTATIN 5 MILLIGRAM(S): 20 TABLET, FILM COATED ORAL at 21:35

## 2019-08-09 RX ADMIN — Medication 100 MILLIGRAM(S): at 13:00

## 2019-08-09 RX ADMIN — Medication 100 MILLIGRAM(S): at 21:35

## 2019-08-09 RX ADMIN — Medication 4 UNIT(S): at 17:34

## 2019-08-09 RX ADMIN — PIPERACILLIN AND TAZOBACTAM 25 GRAM(S): 4; .5 INJECTION, POWDER, LYOPHILIZED, FOR SOLUTION INTRAVENOUS at 05:30

## 2019-08-09 RX ADMIN — PIPERACILLIN AND TAZOBACTAM 25 GRAM(S): 4; .5 INJECTION, POWDER, LYOPHILIZED, FOR SOLUTION INTRAVENOUS at 21:35

## 2019-08-09 RX ADMIN — TAMSULOSIN HYDROCHLORIDE 0.4 MILLIGRAM(S): 0.4 CAPSULE ORAL at 21:36

## 2019-08-09 RX ADMIN — CHLORHEXIDINE GLUCONATE 1 APPLICATION(S): 213 SOLUTION TOPICAL at 12:39

## 2019-08-09 NOTE — PROGRESS NOTE ADULT - SUBJECTIVE AND OBJECTIVE BOX
Patient is a 63y old  Male who presents with a chief complaint of OM (09 Aug 2019 14:20)       INTERVAL HPI/OVERNIGHT EVENTS:  Patient seen and evaluated at bedside.  Pt is resting comfortable in NAD. Denies N/V/F/C.      Allergies    No Known Allergies    Intolerances        Vital Signs Last 24 Hrs  T(C): 37 (09 Aug 2019 11:50), Max: 37.2 (09 Aug 2019 08:13)  T(F): 98.6 (09 Aug 2019 11:50), Max: 99 (09 Aug 2019 08:13)  HR: 92 (09 Aug 2019 11:50) (82 - 92)  BP: 149/69 (09 Aug 2019 11:50) (136/73 - 153/70)  BP(mean): --  RR: 17 (09 Aug 2019 11:50) (16 - 17)  SpO2: 97% (09 Aug 2019 11:50) (96% - 99%)    LABS:                        6.6    6.96  )-----------( 102      ( 09 Aug 2019 05:06 )             20.6     08-09    138  |  99  |  9   ----------------------------<  172<H>  4.1   |  25  |  1.24    Ca    8.8      09 Aug 2019 05:06  Phos  2.8     08-08  Mg     2.0     08-08    TPro  7.6  /  Alb  3.8  /  TBili  0.4  /  DBili  x   /  AST  17  /  ALT  31  /  AlkPhos  156<H>  08-08    PT/INR - ( 08 Aug 2019 05:12 )   PT: 12.2 SEC;   INR: 1.10          PTT - ( 08 Aug 2019 05:12 )  PTT:29.8 SEC    CAPILLARY BLOOD GLUCOSE      POCT Blood Glucose.: 169 mg/dL (09 Aug 2019 12:04)  POCT Blood Glucose.: 176 mg/dL (09 Aug 2019 07:54)  POCT Blood Glucose.: 125 mg/dL (08 Aug 2019 21:27)  POCT Blood Glucose.: 90 mg/dL (08 Aug 2019 19:07)      Lower Extremity Physical Exam:    s/p LF revisional TMA with percutaneous LOLA and Right Hallux bone biopsy on 8/8. Right Hallux biopsy site stable w/ intact sutures, no dehiscence, well copated, no drainage, no acute signs of infection. Left LOLA site with sutures intact without any drainage or signs of acute infection. Left revisional TMA site with sutures intact, no suture tension, no hematoma, normal bleeding likely 2/2 post op status, penrose drain removed and site closed with hand ties, no acute signs of infection. No duskiness of the dorsoplantar flap observed.    RADIOLOGY & ADDITIONAL TESTS: Patient is a 63y old  Male who presents with a chief complaint of OM (09 Aug 2019 14:20)       INTERVAL HPI/OVERNIGHT EVENTS:  Patient seen and evaluated at bedside.  Pt is resting comfortable in NAD. Denies N/V/F/C.      Allergies    No Known Allergies    Intolerances        Vital Signs Last 24 Hrs  T(C): 37 (09 Aug 2019 11:50), Max: 37.2 (09 Aug 2019 08:13)  T(F): 98.6 (09 Aug 2019 11:50), Max: 99 (09 Aug 2019 08:13)  HR: 92 (09 Aug 2019 11:50) (82 - 92)  BP: 149/69 (09 Aug 2019 11:50) (136/73 - 153/70)  BP(mean): --  RR: 17 (09 Aug 2019 11:50) (16 - 17)  SpO2: 97% (09 Aug 2019 11:50) (96% - 99%)    LABS:                        6.6    6.96  )-----------( 102      ( 09 Aug 2019 05:06 )             20.6     08-09    138  |  99  |  9   ----------------------------<  172<H>  4.1   |  25  |  1.24    Ca    8.8      09 Aug 2019 05:06  Phos  2.8     08-08  Mg     2.0     08-08    TPro  7.6  /  Alb  3.8  /  TBili  0.4  /  DBili  x   /  AST  17  /  ALT  31  /  AlkPhos  156<H>  08-08    PT/INR - ( 08 Aug 2019 05:12 )   PT: 12.2 SEC;   INR: 1.10          PTT - ( 08 Aug 2019 05:12 )  PTT:29.8 SEC    CAPILLARY BLOOD GLUCOSE      POCT Blood Glucose.: 169 mg/dL (09 Aug 2019 12:04)  POCT Blood Glucose.: 176 mg/dL (09 Aug 2019 07:54)  POCT Blood Glucose.: 125 mg/dL (08 Aug 2019 21:27)  POCT Blood Glucose.: 90 mg/dL (08 Aug 2019 19:07)      Lower Extremity Physical Exam:    s/p LF revisional TMA with percutaneous LOLA and Right Hallux bone biopsy on 8/8. Right Hallux biopsy site stable w/ intact sutures, no dehiscence, well copated, no drainage, no acute signs of infection. Left LOLA site with sutures intact without any drainage or signs of acute infection. Left revisional TMA site with sutures intact, no suture tension, no hematoma, normal bleeding likely 2/2 post op status, penrose drain removed and site closed with hand ties, no acute signs of infection. No duskiness of the dorsoplantar flap observed.    RADIOLOGY & ADDITIONAL TESTS:    < from: Xray Foot AP + Lateral + Oblique, Bilat (08.08.19 @ 18:21) >  PROCEDURE DATE:  Aug  8 2019         INTERPRETATION:  CLINICAL INDICATION: follow-up status post revision left   foot TMA; right hallux fracture and swelling    EXAM:  Frontal, oblique, and lateral views of both feet from 8/8/2019 at 1821.   Compared to prior study from 8/1/2019.    IMPRESSION:  Status post left foot TMA stump revision with postsurgical changes in the   overlying soft tissues. Concomitant tendo Achilles lengthening procedure   also performed. No focal areas of osteomyelitis. Normal configuration and   alignment of the left midfoot and hindfoot osseous structures and   articulations with preserved joint spaces. Splint material supports the   plantar posterior surfaces of the extremity.    Redemonstrated slightly distracted avulsion fracture of right hallux   distal phalangeal tuft with surrounding soft tissue swelling. In   addition, split appearing hallux nail shadow. Remainder of right foot   stable and unremarkable.                  MARY ALICE MARTINS M.D., ATTENDING RADIOLOGIST  This document has been electronically signed. Aug  9 2019 10:35AM    < end of copied text >

## 2019-08-09 NOTE — PROGRESS NOTE ADULT - ASSESSMENT
Pt s/p LF revisional TMA with sanaz Pt s/p LF revisional TMA with percutaneous LOLA and right foot hallux bone biopsy on 8/8  - Pt seen and evaluated  - Vital signs stable, WBC 6.96, H/H 6.6/20.6 and received transfusion this morning, repeat CBC and BMP at 8pm  - Surgical sites stable for right hallux bone biopsy site with sutures intact, no acute SOI, left foot percutaneous LOLA sites with sutures intact, no acute SOI and left revisional TMA site closed with sutures intact, penrose draine removed and closed with 3.0 nylon hand ties, no acute SOI  - Surgical sites assessed and dressed with DSD and posterior splint reapplied to the LLE  - Awaiting OR data (path/culture) for final plan  - Pt tolerating the pain well  - Follow up with CBC and BMP; possible recommendation for transfusion with no normalization of H/H  - Seen with attending Pt s/p LF revisional TMA with percutaneous LOLA and right foot hallux bone biopsy on 8/8  - Pt seen and evaluated  - Vital signs stable, WBC 6.96, H/H 6.6/20.6 and received transfusion this morning, repeat CBC and BMP at 8pm  - Surgical sites stable for right hallux bone biopsy site with sutures intact, no acute SOI, left foot percutaneous LOLA sites with sutures intact, no acute SOI and left revisional TMA site closed with sutures intact, penrose draine removed and closed with 3.0 nylon, no acute SOI  - Surgical sites assessed and dressed with DSD and posterior splint reapplied to the LLE  - Awaiting OR data (path/culture) for final plan  - Pt tolerating the pain well  - Follow up with CBC and BMP; possible recommendation for transfusion with no normalization of H/H  - Seen with attending

## 2019-08-09 NOTE — PROGRESS NOTE ADULT - SUBJECTIVE AND OBJECTIVE BOX
Patient is a 63y old  Male who presents with a chief complaint of OM (08 Aug 2019 16:32)      Subjective: Translation provided by daughter at bedside per patient request. Patient feels tired this AM. He denies pain in his foot. He also denies chest pain or SOB. Hgb 6.6 this AM, getting 1U PRBCs.     MEDICATIONS  (STANDING):  chlorhexidine 4% Liquid 1 Application(s) Topical daily  dextrose 5%. 1000 milliLiter(s) (50 mL/Hr) IV Continuous <Continuous>  dextrose 50% Injectable 12.5 Gram(s) IV Push once  dextrose 50% Injectable 25 Gram(s) IV Push once  dextrose 50% Injectable 25 Gram(s) IV Push once  docusate sodium 100 milliGRAM(s) Oral three times a day  famotidine    Tablet 40 milliGRAM(s) Oral at bedtime  insulin glargine Injectable (LANTUS) 30 Unit(s) SubCutaneous at bedtime  insulin lispro (HumaLOG) corrective regimen sliding scale   SubCutaneous three times a day before meals  insulin lispro Injectable (HumaLOG) 4 Unit(s) SubCutaneous three times a day before meals  piperacillin/tazobactam IVPB.. 3.375 Gram(s) IV Intermittent every 8 hours  polyethylene glycol 3350 17 Gram(s) Oral daily  senna 2 Tablet(s) Oral at bedtime  simvastatin 5 milliGRAM(s) Oral at bedtime  tamsulosin 0.4 milliGRAM(s) Oral at bedtime    MEDICATIONS  (PRN):  acetaminophen   Tablet .. 650 milliGRAM(s) Oral every 6 hours PRN Mild Pain (1 - 3)  dextrose 40% Gel 15 Gram(s) Oral once PRN Blood Glucose LESS THAN 70 milliGRAM(s)/deciliter  glucagon  Injectable 1 milliGRAM(s) IntraMuscular once PRN Glucose LESS THAN 70 milligrams/deciliter  HYDROmorphone  Injectable 0.5 milliGRAM(s) IV Push every 4 hours PRN Severe Pain (7 - 10)  oxyCODONE    5 mG/acetaminophen 325 mG 1 Tablet(s) Oral every 4 hours PRN Moderate Pain (4 - 6)        Objective:    Vitals: Vital Signs Last 24 Hrs  T(C): 37 (08-09-19 @ 11:50), Max: 37.2 (08-09-19 @ 08:13)  T(F): 98.6 (08-09-19 @ 11:50), Max: 99 (08-09-19 @ 08:13)  HR: 92 (08-09-19 @ 11:50) (82 - 92)  BP: 149/69 (08-09-19 @ 11:50) (136/73 - 153/70)  BP(mean): --  RR: 17 (08-09-19 @ 11:50) (16 - 17)  SpO2: 97% (08-09-19 @ 11:50) (96% - 99%)            I&O's Summary    08 Aug 2019 07:01  -  09 Aug 2019 07:00  --------------------------------------------------------  IN: 440 mL / OUT: 250 mL / NET: 190 mL      PHYSICAL EXAM:  GENERAL: NAD, well-groomed, well-developed  HEAD:  Atraumatic, Normocephalic  CHEST/LUNG: Clear to percussion bilaterally; No rales, rhonchi, wheezing, or rubs  HEART: Regular rate and rhythm; No murmurs, rubs, or gallops  ABDOMEN: Soft, Nontender, Nondistended; Bowel sounds present  EXTREMITIES:  L foot in cast; R foot dressed   NERVOUS SYSTEM:  Alert & Oriented X3, Good concentration                                  LABS:  08-09    138  |  99  |  9   ----------------------------<  172<H>  4.1   |  25  |  1.24  08-08    141  |  103  |  11  ----------------------------<  144<H>  4.4   |  27  |  1.36<H>  08-07    139  |  100  |  11  ----------------------------<  134<H>  3.8   |  28  |  1.26    Ca    8.8      09 Aug 2019 05:06  Ca    9.2      08 Aug 2019 05:12  Ca    8.9      07 Aug 2019 05:33  Phos  2.8     08-08  Mg     2.0     08-08    TPro  7.6  /  Alb  3.8  /  TBili  0.4  /  DBili  x   /  AST  17  /  ALT  31  /  AlkPhos  156<H>  08-08  TPro  6.9  /  Alb  3.2<L>  /  TBili  0.4  /  DBili  x   /  AST  18  /  ALT  33  /  AlkPhos  164<H>  08-07      PT/INR - ( 08 Aug 2019 05:12 )   PT: 12.2 SEC;   INR: 1.10          PTT - ( 08 Aug 2019 05:12 )  PTT:29.8 SEC                                        6.6    6.96  )-----------( 102      ( 09 Aug 2019 05:06 )             20.6                         8.3    5.30  )-----------( 104      ( 08 Aug 2019 05:12 )             26.2                         7.8    4.15  )-----------( 46       ( 07 Aug 2019 05:33 )             24.1     CAPILLARY BLOOD GLUCOSE      POCT Blood Glucose.: 169 mg/dL (09 Aug 2019 12:04)  POCT Blood Glucose.: 176 mg/dL (09 Aug 2019 07:54)  POCT Blood Glucose.: 125 mg/dL (08 Aug 2019 21:27)  POCT Blood Glucose.: 90 mg/dL (08 Aug 2019 19:07)      RADIOLOGY & ADDITIONAL TESTS:    Imaging Personally Reviewed:  [ X] YES  [ ] NO      Consultants involved in case:   Consultant(s) Notes Reviewed:  [X ] YES  [ ] NO:   Care Discussed with Consultants/Other Providers [ ] YES  [ ] NO

## 2019-08-09 NOTE — PROGRESS NOTE ADULT - ASSESSMENT
64 yo M with PMH extensive small cell lung cancer, metastatic to bone, and brain on Gemcitabine (last dose Mon 7/29/19), HTN, HLD, DM, CKD. P/w fever, chills, urinary incontinence and cough with white sputum.  Fever, leukocytosis  R foot toe wound; L foot TMA wound  Per note, patient has purulence expressed from Hallux wound prior  Both wounds appear chronic/longstanding, with minimal erythema or discharge  BCX NGTD, UCX neg  S/p LLE TMA revision; R bone biopsy 8/8  Overall, fever, wound infection, elevated ESR, OM  - Zosyn 3.375g q 8  - F/U podiatry  - F/U OR cultures/path  - Discussed case with podiatry resident, low-moderate consider for residual infection    Elmer Child MD  Pager [phone]  After 5pm and on weekends call [phone]

## 2019-08-09 NOTE — PROGRESS NOTE ADULT - PROBLEM SELECTOR PLAN 1
MRI findings with "Soft tissue ulceration over lateral plantar aspect of the TMA stump with evidence for osteomyelitis involving the underlying 4th and 5th   metatarsal margins. The 4th and 5th metatarsal stumps are also noted to   be longer relative to the remaining metatarsal stump remnants suggesting an aggressive edge."  - S/p OR 8/8 for Left foot revisional TMA w/ PT/Achilles tenotomy and Right foot Hallux Bone Biopsy   - C/w Zosyn per ID recs  - Will f/u OR cultures/path

## 2019-08-09 NOTE — PROGRESS NOTE ADULT - SUBJECTIVE AND OBJECTIVE BOX
CC: F/U for OM    Saw/spoke to patient. Patient well. No new complaints. S/p TMA revision.    Allergies  No Known Allergies    ANTIMICROBIALS:  piperacillin/tazobactam IVPB.. 3.375 every 8 hours    PE:    Vital Signs Last 24 Hrs  T(C): 37.2 (09 Aug 2019 08:13), Max: 37.2 (09 Aug 2019 08:13)  T(F): 99 (09 Aug 2019 08:13), Max: 99 (09 Aug 2019 08:13)  HR: 86 (09 Aug 2019 08:13) (75 - 86)  BP: 139/69 (09 Aug 2019 08:13) (136/73 - 175/76)  RR: 17 (09 Aug 2019 08:13) (16 - 18)  SpO2: 96% (09 Aug 2019 08:13) (96% - 99%)    Gen: AOx3, NAD, non-toxic, pleasant  CV: S1+S2 normal, nontachycardic  Resp: Clear bilat, no resp distress, no crackles/wheezes  Abd: Soft, nontender, +BS  Ext: LLE TMA bandaged wound    LABS:                       6.6    6.96  )-----------( 102      ( 09 Aug 2019 05:06 )             20.6     08-09    138  |  99  |  9   ----------------------------<  172<H>  4.1   |  25  |  1.24    Ca    8.8      09 Aug 2019 05:06  Phos  2.8     08-08  Mg     2.0     08-08    TPro  7.6  /  Alb  3.8  /  TBili  0.4  /  DBili  x   /  AST  17  /  ALT  31  /  AlkPhos  156<H>  08-08    MICROBIOLOGY:    BONE  08-08-19 Pending    FOOT  08-08-19 Pending    (otherwise reviewed)    RADIOLOGY:    8/8 XR:    IMPRESSION:  Status post left foot TMA stump revision with postsurgical changes in the   overlying soft tissues. Concomitant tendo Achilles lengthening procedure   also performed. No focal areas of osteomyelitis. Normal configuration and   alignment of the left midfoot and hindfoot osseous structures and   articulations with preserved joint spaces. Splint material supports the   plantar posterior surfaces of the extremity.    Redemonstrated slightly distracted avulsion fracture of right hallux   distal phalangeal tuft with surrounding soft tissue swelling. In   addition, split appearing hallux nail shadow. Remainder of right foot   stable and unremarkable.

## 2019-08-09 NOTE — PROGRESS NOTE ADULT - ASSESSMENT
63M with PMHx of extensive small cell lung cancer, metastatic to bone, and brain followed by Dr. Thao who presents with fever, chills found to have OM of left foot s/p revisional TMA on 8/8

## 2019-08-09 NOTE — PROGRESS NOTE ADULT - PROBLEM SELECTOR PLAN 5
Anemia and thrombocytopenia likely myelosuppression from Gemzar, recently given on last week   - continue to monitor CBC  - PLTs in [place] from chemo, s/p 1U PLTs with improvement to 104   - Hgb 6.6 this AM, likely 2/2 OR, getting 1U PRBCs and will trend CBC

## 2019-08-10 LAB
ANION GAP SERPL CALC-SCNC: 9 MMO/L — SIGNIFICANT CHANGE UP (ref 7–14)
BACTERIA BLD CULT: SIGNIFICANT CHANGE UP
BUN SERPL-MCNC: 11 MG/DL — SIGNIFICANT CHANGE UP (ref 7–23)
CALCIUM SERPL-MCNC: 8.5 MG/DL — SIGNIFICANT CHANGE UP (ref 8.4–10.5)
CHLORIDE SERPL-SCNC: 103 MMOL/L — SIGNIFICANT CHANGE UP (ref 98–107)
CO2 SERPL-SCNC: 26 MMOL/L — SIGNIFICANT CHANGE UP (ref 22–31)
CREAT SERPL-MCNC: 1.26 MG/DL — SIGNIFICANT CHANGE UP (ref 0.5–1.3)
GLUCOSE BLDC GLUCOMTR-MCNC: 130 MG/DL — HIGH (ref 70–99)
GLUCOSE BLDC GLUCOMTR-MCNC: 178 MG/DL — HIGH (ref 70–99)
GLUCOSE BLDC GLUCOMTR-MCNC: 181 MG/DL — HIGH (ref 70–99)
GLUCOSE BLDC GLUCOMTR-MCNC: 95 MG/DL — SIGNIFICANT CHANGE UP (ref 70–99)
GLUCOSE SERPL-MCNC: 139 MG/DL — HIGH (ref 70–99)
HCT VFR BLD CALC: 22.5 % — LOW (ref 39–50)
HGB BLD-MCNC: 7.4 G/DL — LOW (ref 13–17)
MAGNESIUM SERPL-MCNC: 2 MG/DL — SIGNIFICANT CHANGE UP (ref 1.6–2.6)
MCHC RBC-ENTMCNC: 30.6 PG — SIGNIFICANT CHANGE UP (ref 27–34)
MCHC RBC-ENTMCNC: 32.9 % — SIGNIFICANT CHANGE UP (ref 32–36)
MCV RBC AUTO: 93 FL — SIGNIFICANT CHANGE UP (ref 80–100)
NRBC # FLD: 0 K/UL — SIGNIFICANT CHANGE UP (ref 0–0)
PLATELET # BLD AUTO: 129 K/UL — LOW (ref 150–400)
PMV BLD: 10.7 FL — SIGNIFICANT CHANGE UP (ref 7–13)
POTASSIUM SERPL-MCNC: 4.2 MMOL/L — SIGNIFICANT CHANGE UP (ref 3.5–5.3)
POTASSIUM SERPL-SCNC: 4.2 MMOL/L — SIGNIFICANT CHANGE UP (ref 3.5–5.3)
RBC # BLD: 2.42 M/UL — LOW (ref 4.2–5.8)
RBC # FLD: 14.4 % — SIGNIFICANT CHANGE UP (ref 10.3–14.5)
SODIUM SERPL-SCNC: 138 MMOL/L — SIGNIFICANT CHANGE UP (ref 135–145)
WBC # BLD: 6.16 K/UL — SIGNIFICANT CHANGE UP (ref 3.8–10.5)
WBC # FLD AUTO: 6.16 K/UL — SIGNIFICANT CHANGE UP (ref 3.8–10.5)

## 2019-08-10 PROCEDURE — 99233 SBSQ HOSP IP/OBS HIGH 50: CPT

## 2019-08-10 RX ORDER — VANCOMYCIN HCL 1 G
1000 VIAL (EA) INTRAVENOUS EVERY 12 HOURS
Refills: 0 | Status: DISCONTINUED | OUTPATIENT
Start: 2019-08-11 | End: 2019-08-11

## 2019-08-10 RX ORDER — VANCOMYCIN HCL 1 G
1000 VIAL (EA) INTRAVENOUS ONCE
Refills: 0 | Status: COMPLETED | OUTPATIENT
Start: 2019-08-10 | End: 2019-08-10

## 2019-08-10 RX ADMIN — Medication 1: at 17:04

## 2019-08-10 RX ADMIN — Medication 100 MILLIGRAM(S): at 21:20

## 2019-08-10 RX ADMIN — Medication 4 UNIT(S): at 08:33

## 2019-08-10 RX ADMIN — Medication 100 MILLIGRAM(S): at 14:09

## 2019-08-10 RX ADMIN — PIPERACILLIN AND TAZOBACTAM 25 GRAM(S): 4; .5 INJECTION, POWDER, LYOPHILIZED, FOR SOLUTION INTRAVENOUS at 14:09

## 2019-08-10 RX ADMIN — POLYETHYLENE GLYCOL 3350 17 GRAM(S): 17 POWDER, FOR SOLUTION ORAL at 12:37

## 2019-08-10 RX ADMIN — Medication 250 MILLIGRAM(S): at 12:38

## 2019-08-10 RX ADMIN — TAMSULOSIN HYDROCHLORIDE 0.4 MILLIGRAM(S): 0.4 CAPSULE ORAL at 21:19

## 2019-08-10 RX ADMIN — Medication 4 UNIT(S): at 17:04

## 2019-08-10 RX ADMIN — OXYCODONE AND ACETAMINOPHEN 1 TABLET(S): 5; 325 TABLET ORAL at 08:37

## 2019-08-10 RX ADMIN — Medication 100 MILLIGRAM(S): at 06:19

## 2019-08-10 RX ADMIN — PIPERACILLIN AND TAZOBACTAM 25 GRAM(S): 4; .5 INJECTION, POWDER, LYOPHILIZED, FOR SOLUTION INTRAVENOUS at 06:18

## 2019-08-10 RX ADMIN — SENNA PLUS 2 TABLET(S): 8.6 TABLET ORAL at 21:19

## 2019-08-10 RX ADMIN — SIMVASTATIN 5 MILLIGRAM(S): 20 TABLET, FILM COATED ORAL at 21:20

## 2019-08-10 RX ADMIN — FAMOTIDINE 40 MILLIGRAM(S): 10 INJECTION INTRAVENOUS at 21:19

## 2019-08-10 RX ADMIN — OXYCODONE AND ACETAMINOPHEN 1 TABLET(S): 5; 325 TABLET ORAL at 09:35

## 2019-08-10 RX ADMIN — PIPERACILLIN AND TAZOBACTAM 25 GRAM(S): 4; .5 INJECTION, POWDER, LYOPHILIZED, FOR SOLUTION INTRAVENOUS at 21:18

## 2019-08-10 RX ADMIN — INSULIN GLARGINE 30 UNIT(S): 100 INJECTION, SOLUTION SUBCUTANEOUS at 22:32

## 2019-08-10 RX ADMIN — Medication 4 UNIT(S): at 12:33

## 2019-08-10 NOTE — PROVIDER CONTACT NOTE (CRITICAL VALUE NOTIFICATION) - BACKGROUND
Patient with osteomyelitis of left foot wound s/p TMA revision. Has right foot plantar hallux wound s/p pus debridement.

## 2019-08-10 NOTE — PROGRESS NOTE ADULT - ASSESSMENT
Pt s/p LF revisional TMA with percutaneous LOLA and right foot hallux bone biopsy on 8/8  - Pt seen and evaluated  - Vital signs stable, 8/10 WBC 6.16, 8/10 H/H 7.4/22.5  - Surgical sites stable for right hallux bone biopsy site with sutures intact, no acute SOI, left foot percutaneous LOLA sites with sutures intact, no acute SOI and left revisional TMA site closed with sutures intact, no acute SOI  - Surgical sites assessed and dressed with DSD and posterior splint reapplied to the LLE  - Awaiting OR data (path/culture) for final plan  - Pt tolerating the pain well  - Will continue to monitor CBC and BMP for possible recommendation of transfusion with no normalization of H/H  - Discussed with attending

## 2019-08-10 NOTE — PROGRESS NOTE ADULT - SUBJECTIVE AND OBJECTIVE BOX
Patient is a 63y old  Male who presents with a chief complaint of OM (09 Aug 2019 14:20)       INTERVAL HPI/OVERNIGHT EVENTS:  Patient seen and evaluated at bedside.  Pt is resting comfortable in NAD. Denies N/V/F/C.     Allergies    No Known Allergies    Intolerances        Vital Signs Last 24 Hrs  T(C): 36.3 (10 Aug 2019 06:17), Max: 37.1 (09 Aug 2019 21:21)  T(F): 97.4 (10 Aug 2019 06:17), Max: 98.7 (09 Aug 2019 21:21)  HR: 69 (10 Aug 2019 06:17) (69 - 92)  BP: 139/64 (10 Aug 2019 06:17) (139/64 - 149/69)  BP(mean): --  RR: 18 (10 Aug 2019 06:17) (17 - 18)  SpO2: 95% (10 Aug 2019 06:17) (94% - 97%)    LABS:                        7.4    6.16  )-----------( 129      ( 10 Aug 2019 06:40 )             22.5     08-10    138  |  103  |  11  ----------------------------<  139<H>  4.2   |  26  |  1.26    Ca    8.5      10 Aug 2019 06:40  Mg     2.0     08-10          CAPILLARY BLOOD GLUCOSE      POCT Blood Glucose.: 130 mg/dL (10 Aug 2019 07:41)  POCT Blood Glucose.: 155 mg/dL (09 Aug 2019 21:01)  POCT Blood Glucose.: 256 mg/dL (09 Aug 2019 17:31)  POCT Blood Glucose.: 235 mg/dL (09 Aug 2019 16:55)  POCT Blood Glucose.: 169 mg/dL (09 Aug 2019 12:04)      Lower Extremity Physical Exam:  s/p LF revisional TMA with percutaneous LOLA and Right Hallux bone biopsy on 8/8. Right Hallux biopsy site stable w/ intact sutures, no dehiscence, well copated, no drainage, no acute signs of infection. Left LOLA site with sutures intact without any drainage or signs of acute infection. Left revisional TMA site with sutures intact, no suture tension, no hematoma, normal bleeding likely 2/2 post op status, no acute signs of infection. No duskiness of the dorsoplantar flap observed.    RADIOLOGY & ADDITIONAL TESTS:

## 2019-08-10 NOTE — PROGRESS NOTE ADULT - PROBLEM SELECTOR PLAN 1
MRI findings with "Soft tissue ulceration over lateral plantar aspect of the TMA stump with evidence for osteomyelitis involving the underlying 4th and 5th   metatarsal margins. The 4th and 5th metatarsal stumps are also noted to   be longer relative to the remaining metatarsal stump remnants suggesting an aggressive edge."  - S/p OR 8/8 for Left foot revisional TMA w/ PT/Achilles tenotomy and Right foot Hallux Bone Biopsy   - C/w Zosyn per ID recs. Will add Vanco today as wound cx w/ staph aureus   - Will f/u OR cultures/path

## 2019-08-10 NOTE — PROGRESS NOTE ADULT - SUBJECTIVE AND OBJECTIVE BOX
Patient is a 63y old  Male who presents with a chief complaint of Dehisced left foot TMA, Right foot hallux wound to bone (10 Aug 2019 10:03)      Subjective: [place]  # [id number]. Patient feels well today, no longer feels tired. Denies chest pain or SOB. Denies foot pain at this time.     MEDICATIONS  (STANDING):  chlorhexidine 4% Liquid 1 Application(s) Topical daily  dextrose 5%. 1000 milliLiter(s) (50 mL/Hr) IV Continuous <Continuous>  dextrose 50% Injectable 12.5 Gram(s) IV Push once  dextrose 50% Injectable 25 Gram(s) IV Push once  dextrose 50% Injectable 25 Gram(s) IV Push once  docusate sodium 100 milliGRAM(s) Oral three times a day  famotidine    Tablet 40 milliGRAM(s) Oral at bedtime  insulin glargine Injectable (LANTUS) 30 Unit(s) SubCutaneous at bedtime  insulin lispro (HumaLOG) corrective regimen sliding scale   SubCutaneous three times a day before meals  insulin lispro Injectable (HumaLOG) 4 Unit(s) SubCutaneous three times a day before meals  piperacillin/tazobactam IVPB.. 3.375 Gram(s) IV Intermittent every 8 hours  polyethylene glycol 3350 17 Gram(s) Oral daily  senna 2 Tablet(s) Oral at bedtime  simvastatin 5 milliGRAM(s) Oral at bedtime  tamsulosin 0.4 milliGRAM(s) Oral at bedtime    MEDICATIONS  (PRN):  acetaminophen   Tablet .. 650 milliGRAM(s) Oral every 6 hours PRN Mild Pain (1 - 3)  dextrose 40% Gel 15 Gram(s) Oral once PRN Blood Glucose LESS THAN 70 milliGRAM(s)/deciliter  glucagon  Injectable 1 milliGRAM(s) IntraMuscular once PRN Glucose LESS THAN 70 milligrams/deciliter  HYDROmorphone  Injectable 0.5 milliGRAM(s) IV Push every 4 hours PRN Severe Pain (7 - 10)  oxyCODONE    5 mG/acetaminophen 325 mG 1 Tablet(s) Oral every 4 hours PRN Moderate Pain (4 - 6)        Objective:    Vitals: Vital Signs Last 24 Hrs  T(C): 36.4 (08-10-19 @ 13:17), Max: 37.1 (08-09-19 @ 21:21)  T(F): 97.6 (08-10-19 @ 13:17), Max: 98.7 (08-09-19 @ 21:21)  HR: 74 (08-10-19 @ 13:17) (69 - 79)  BP: 132/56 (08-10-19 @ 13:17) (132/56 - 141/54)  BP(mean): --  RR: 18 (08-10-19 @ 13:17) (18 - 18)  SpO2: 100% (08-10-19 @ 13:17) (94% - 100%)            I&O's Summary      PHYSICAL EXAM:  GENERAL: NAD, well-groomed, well-developed  HEAD:  Atraumatic, Normocephalic  CHEST/LUNG: Clear to percussion bilaterally; No rales, rhonchi, wheezing, or rubs  HEART: Regular rate and rhythm; No murmurs, rubs, or gallops  ABDOMEN: Soft, Nontender, Nondistended; Bowel sounds present  EXTREMITIES:  L foot in cast; R foot dressed   NERVOUS SYSTEM:  Alert & Oriented X3, Good concentration                                  LABS:  08-10    138  |  103  |  11  ----------------------------<  139<H>  4.2   |  26  |  1.26  08-09    136  |  98  |  12  ----------------------------<  138<H>  3.8   |  26  |  1.39<H>  08-09    138  |  99  |  9   ----------------------------<  172<H>  4.1   |  25  |  1.24    Ca    8.5      10 Aug 2019 06:40  Ca    9.0      09 Aug 2019 20:49  Ca    8.8      09 Aug 2019 05:06  Mg     2.0     08-10    TPro  7.6  /  Alb  3.8  /  TBili  0.4  /  DBili  x   /  AST  17  /  ALT  31  /  AlkPhos  156<H>  08-08                                              7.4    6.16  )-----------( 129      ( 10 Aug 2019 06:40 )             22.5                         8.0    6.70  )-----------( 116      ( 09 Aug 2019 20:49 )             24.1                         6.6    6.96  )-----------( 102      ( 09 Aug 2019 05:06 )             20.6     CAPILLARY BLOOD GLUCOSE      POCT Blood Glucose.: 95 mg/dL (10 Aug 2019 12:03)  POCT Blood Glucose.: 130 mg/dL (10 Aug 2019 07:41)  POCT Blood Glucose.: 155 mg/dL (09 Aug 2019 21:01)  POCT Blood Glucose.: 256 mg/dL (09 Aug 2019 17:31)  POCT Blood Glucose.: 235 mg/dL (09 Aug 2019 16:55)      RADIOLOGY & ADDITIONAL TESTS:    Imaging Personally Reviewed:  [ ] YES  [ ] NO      Consultants involved in case:   Consultant(s) Notes Reviewed:  [ ] YES  [ ] NO:   Care Discussed with Consultants/Other Providers [ ] YES  [ ] NO

## 2019-08-11 ENCOUNTER — APPOINTMENT (OUTPATIENT)
Dept: MRI IMAGING | Facility: IMAGING CENTER | Age: 64
End: 2019-08-11

## 2019-08-11 ENCOUNTER — TRANSCRIPTION ENCOUNTER (OUTPATIENT)
Age: 64
End: 2019-08-11

## 2019-08-11 PROBLEM — C34.90 MALIGNANT NEOPLASM OF UNSPECIFIED PART OF UNSPECIFIED BRONCHUS OR LUNG: Chronic | Status: ACTIVE | Noted: 2019-07-31

## 2019-08-11 PROBLEM — E78.5 HYPERLIPIDEMIA, UNSPECIFIED: Chronic | Status: ACTIVE | Noted: 2019-07-31

## 2019-08-11 LAB
-  CEFAZOLIN: SIGNIFICANT CHANGE UP
-  CEFAZOLIN: SIGNIFICANT CHANGE UP
-  CIPROFLOXACIN: SIGNIFICANT CHANGE UP
-  CIPROFLOXACIN: SIGNIFICANT CHANGE UP
-  CLINDAMYCIN: SIGNIFICANT CHANGE UP
-  CLINDAMYCIN: SIGNIFICANT CHANGE UP
-  DAPTOMYCIN: SIGNIFICANT CHANGE UP
-  DAPTOMYCIN: SIGNIFICANT CHANGE UP
-  ERYTHROMYCIN: SIGNIFICANT CHANGE UP
-  ERYTHROMYCIN: SIGNIFICANT CHANGE UP
-  GENTAMICIN: SIGNIFICANT CHANGE UP
-  GENTAMICIN: SIGNIFICANT CHANGE UP
-  LEVOFLOXACIN: SIGNIFICANT CHANGE UP
-  LEVOFLOXACIN: SIGNIFICANT CHANGE UP
-  LINEZOLID: SIGNIFICANT CHANGE UP
-  LINEZOLID: SIGNIFICANT CHANGE UP
-  MOXIFLOXACIN(AEROBIC): SIGNIFICANT CHANGE UP
-  MOXIFLOXACIN(AEROBIC): SIGNIFICANT CHANGE UP
-  OXACILLIN: SIGNIFICANT CHANGE UP
-  OXACILLIN: SIGNIFICANT CHANGE UP
-  PENICILLIN: SIGNIFICANT CHANGE UP
-  PENICILLIN: SIGNIFICANT CHANGE UP
-  RIFAMPIN.: SIGNIFICANT CHANGE UP
-  RIFAMPIN.: SIGNIFICANT CHANGE UP
-  TETRACYCLINE: SIGNIFICANT CHANGE UP
-  TETRACYCLINE: SIGNIFICANT CHANGE UP
-  TRIMETHOPRIM/SULFAMETHOXAZOLE: SIGNIFICANT CHANGE UP
-  TRIMETHOPRIM/SULFAMETHOXAZOLE: SIGNIFICANT CHANGE UP
-  VANCOMYCIN: SIGNIFICANT CHANGE UP
-  VANCOMYCIN: SIGNIFICANT CHANGE UP
ANION GAP SERPL CALC-SCNC: 11 MMO/L — SIGNIFICANT CHANGE UP (ref 7–14)
BUN SERPL-MCNC: 10 MG/DL — SIGNIFICANT CHANGE UP (ref 7–23)
CALCIUM SERPL-MCNC: 8.4 MG/DL — SIGNIFICANT CHANGE UP (ref 8.4–10.5)
CHLORIDE SERPL-SCNC: 99 MMOL/L — SIGNIFICANT CHANGE UP (ref 98–107)
CO2 SERPL-SCNC: 26 MMOL/L — SIGNIFICANT CHANGE UP (ref 22–31)
CREAT SERPL-MCNC: 1.22 MG/DL — SIGNIFICANT CHANGE UP (ref 0.5–1.3)
CULTURE - SURGICAL SITE: SIGNIFICANT CHANGE UP
CULTURE - SURGICAL SITE: SIGNIFICANT CHANGE UP
GLUCOSE BLDC GLUCOMTR-MCNC: 140 MG/DL — HIGH (ref 70–99)
GLUCOSE BLDC GLUCOMTR-MCNC: 149 MG/DL — HIGH (ref 70–99)
GLUCOSE BLDC GLUCOMTR-MCNC: 177 MG/DL — HIGH (ref 70–99)
GLUCOSE BLDC GLUCOMTR-MCNC: 214 MG/DL — HIGH (ref 70–99)
GLUCOSE SERPL-MCNC: 224 MG/DL — HIGH (ref 70–99)
GRAM STN WND: SIGNIFICANT CHANGE UP
HCT VFR BLD CALC: 23.9 % — LOW (ref 39–50)
HGB BLD-MCNC: 7.8 G/DL — LOW (ref 13–17)
MAGNESIUM SERPL-MCNC: 1.8 MG/DL — SIGNIFICANT CHANGE UP (ref 1.6–2.6)
MCHC RBC-ENTMCNC: 30.7 PG — SIGNIFICANT CHANGE UP (ref 27–34)
MCHC RBC-ENTMCNC: 32.6 % — SIGNIFICANT CHANGE UP (ref 32–36)
MCV RBC AUTO: 94.1 FL — SIGNIFICANT CHANGE UP (ref 80–100)
METHOD TYPE: SIGNIFICANT CHANGE UP
NRBC # FLD: 0 K/UL — SIGNIFICANT CHANGE UP (ref 0–0)
ORGANISM # SPEC MICROSCOPIC CNT: SIGNIFICANT CHANGE UP
PLATELET # BLD AUTO: 188 K/UL — SIGNIFICANT CHANGE UP (ref 150–400)
PMV BLD: 10.5 FL — SIGNIFICANT CHANGE UP (ref 7–13)
POTASSIUM SERPL-MCNC: 3.9 MMOL/L — SIGNIFICANT CHANGE UP (ref 3.5–5.3)
POTASSIUM SERPL-SCNC: 3.9 MMOL/L — SIGNIFICANT CHANGE UP (ref 3.5–5.3)
RBC # BLD: 2.54 M/UL — LOW (ref 4.2–5.8)
RBC # FLD: 14.4 % — SIGNIFICANT CHANGE UP (ref 10.3–14.5)
SODIUM SERPL-SCNC: 136 MMOL/L — SIGNIFICANT CHANGE UP (ref 135–145)
VANCOMYCIN TROUGH SERPL-MCNC: 22.7 UG/ML — HIGH (ref 10–20)
WBC # BLD: 6.2 K/UL — SIGNIFICANT CHANGE UP (ref 3.8–10.5)
WBC # FLD AUTO: 6.2 K/UL — SIGNIFICANT CHANGE UP (ref 3.8–10.5)

## 2019-08-11 PROCEDURE — 99233 SBSQ HOSP IP/OBS HIGH 50: CPT

## 2019-08-11 RX ORDER — VANCOMYCIN HCL 1 G
1000 VIAL (EA) INTRAVENOUS EVERY 12 HOURS
Refills: 0 | Status: DISCONTINUED | OUTPATIENT
Start: 2019-08-11 | End: 2019-08-15

## 2019-08-11 RX ADMIN — PIPERACILLIN AND TAZOBACTAM 25 GRAM(S): 4; .5 INJECTION, POWDER, LYOPHILIZED, FOR SOLUTION INTRAVENOUS at 06:10

## 2019-08-11 RX ADMIN — Medication 2: at 17:24

## 2019-08-11 RX ADMIN — Medication 4 UNIT(S): at 08:15

## 2019-08-11 RX ADMIN — INSULIN GLARGINE 30 UNIT(S): 100 INJECTION, SOLUTION SUBCUTANEOUS at 23:10

## 2019-08-11 RX ADMIN — TAMSULOSIN HYDROCHLORIDE 0.4 MILLIGRAM(S): 0.4 CAPSULE ORAL at 23:11

## 2019-08-11 RX ADMIN — Medication 250 MILLIGRAM(S): at 13:11

## 2019-08-11 RX ADMIN — Medication 1: at 08:15

## 2019-08-11 RX ADMIN — FAMOTIDINE 40 MILLIGRAM(S): 10 INJECTION INTRAVENOUS at 23:11

## 2019-08-11 RX ADMIN — Medication 4 UNIT(S): at 12:38

## 2019-08-11 RX ADMIN — Medication 4 UNIT(S): at 17:24

## 2019-08-11 RX ADMIN — Medication 250 MILLIGRAM(S): at 01:59

## 2019-08-11 RX ADMIN — SIMVASTATIN 5 MILLIGRAM(S): 20 TABLET, FILM COATED ORAL at 23:11

## 2019-08-11 RX ADMIN — PIPERACILLIN AND TAZOBACTAM 25 GRAM(S): 4; .5 INJECTION, POWDER, LYOPHILIZED, FOR SOLUTION INTRAVENOUS at 23:10

## 2019-08-11 RX ADMIN — PIPERACILLIN AND TAZOBACTAM 25 GRAM(S): 4; .5 INJECTION, POWDER, LYOPHILIZED, FOR SOLUTION INTRAVENOUS at 14:24

## 2019-08-11 RX ADMIN — Medication 100 MILLIGRAM(S): at 06:10

## 2019-08-11 NOTE — PROVIDER CONTACT NOTE (CRITICAL VALUE NOTIFICATION) - BACKGROUND
Patient admitted with sepsis. + osteomyelitis of left foot wound. s/p biopsy of right plantar hallux wound.

## 2019-08-11 NOTE — PROGRESS NOTE ADULT - SUBJECTIVE AND OBJECTIVE BOX
Patient is a 63y old  Male who presents with a chief complaint of OM (10 Aug 2019 14:11)      Subjective: [facility]  # [id number]. Patient has no acute complaints today, denies chest pain, SOB, fevers. Denies foot pain.     MEDICATIONS  (STANDING):  chlorhexidine 4% Liquid 1 Application(s) Topical daily  dextrose 5%. 1000 milliLiter(s) (50 mL/Hr) IV Continuous <Continuous>  dextrose 50% Injectable 12.5 Gram(s) IV Push once  dextrose 50% Injectable 25 Gram(s) IV Push once  dextrose 50% Injectable 25 Gram(s) IV Push once  docusate sodium 100 milliGRAM(s) Oral three times a day  famotidine    Tablet 40 milliGRAM(s) Oral at bedtime  insulin glargine Injectable (LANTUS) 30 Unit(s) SubCutaneous at bedtime  insulin lispro (HumaLOG) corrective regimen sliding scale   SubCutaneous three times a day before meals  insulin lispro Injectable (HumaLOG) 4 Unit(s) SubCutaneous three times a day before meals  piperacillin/tazobactam IVPB.. 3.375 Gram(s) IV Intermittent every 8 hours  polyethylene glycol 3350 17 Gram(s) Oral daily  senna 2 Tablet(s) Oral at bedtime  simvastatin 5 milliGRAM(s) Oral at bedtime  tamsulosin 0.4 milliGRAM(s) Oral at bedtime  vancomycin  IVPB 1000 milliGRAM(s) IV Intermittent every 12 hours    MEDICATIONS  (PRN):  acetaminophen   Tablet .. 650 milliGRAM(s) Oral every 6 hours PRN Mild Pain (1 - 3)  dextrose 40% Gel 15 Gram(s) Oral once PRN Blood Glucose LESS THAN 70 milliGRAM(s)/deciliter  glucagon  Injectable 1 milliGRAM(s) IntraMuscular once PRN Glucose LESS THAN 70 milligrams/deciliter  HYDROmorphone  Injectable 0.5 milliGRAM(s) IV Push every 4 hours PRN Severe Pain (7 - 10)  oxyCODONE    5 mG/acetaminophen 325 mG 1 Tablet(s) Oral every 4 hours PRN Moderate Pain (4 - 6)        Objective:    Vitals: Vital Signs Last 24 Hrs  T(C): 36.9 (08-11-19 @ 13:58), Max: 37.3 (08-11-19 @ 06:08)  T(F): 98.4 (08-11-19 @ 13:58), Max: 99.2 (08-11-19 @ 06:08)  HR: 95 (08-11-19 @ 13:58) (79 - 95)  BP: 143/60 (08-11-19 @ 13:58) (132/56 - 148/60)  BP(mean): --  RR: 18 (08-11-19 @ 13:58) (18 - 18)  SpO2: 96% (08-11-19 @ 13:58) (95% - 96%)            I&O's Summary      PHYSICAL EXAM:  GENERAL: NAD, well-groomed, well-developed  HEAD:  Atraumatic, Normocephalic  CHEST/LUNG: Clear to percussion bilaterally; No rales, rhonchi, wheezing, or rubs  HEART: Regular rate and rhythm; No murmurs, rubs, or gallops  ABDOMEN: Soft, Nontender, Nondistended; Bowel sounds present  EXTREMITIES:  L foot in cast; R foot dressed   NERVOUS SYSTEM:  Alert & Oriented X3, Good concentration                                          LABS:  08-11    136  |  99  |  10  ----------------------------<  224<H>  3.9   |  26  |  1.22  08-10    138  |  103  |  11  ----------------------------<  139<H>  4.2   |  26  |  1.26  08-09    136  |  98  |  12  ----------------------------<  138<H>  3.8   |  26  |  1.39<H>    Ca    8.4      11 Aug 2019 05:25  Ca    8.5      10 Aug 2019 06:40  Ca    9.0      09 Aug 2019 20:49  Mg     1.8     08-11                                                7.8    6.20  )-----------( 188      ( 11 Aug 2019 05:25 )             23.9                         7.4    6.16  )-----------( 129      ( 10 Aug 2019 06:40 )             22.5                         8.0    6.70  )-----------( 116      ( 09 Aug 2019 20:49 )             24.1     CAPILLARY BLOOD GLUCOSE      POCT Blood Glucose.: 149 mg/dL (11 Aug 2019 11:54)  POCT Blood Glucose.: 177 mg/dL (11 Aug 2019 07:50)  POCT Blood Glucose.: 181 mg/dL (10 Aug 2019 21:44)  POCT Blood Glucose.: 178 mg/dL (10 Aug 2019 17:00)      RADIOLOGY & ADDITIONAL TESTS:    Imaging Personally Reviewed:  [X ] YES  [ ] NO      Consultants involved in case:   Consultant(s) Notes Reviewed:  [X ] YES  [ ] NO:   Care Discussed with Consultants/Other Providers [ ] YES  [ ] NO

## 2019-08-11 NOTE — DISCHARGE NOTE PROVIDER - PROVIDER TOKENS
PROVIDER:[TOKEN:[[id number]]],PROVIDER:[TOKEN:[[id number]]] PROVIDER:[TOKEN:[[id number]]],PROVIDER:[TOKEN:[[id number]]],PROVIDER:[TOKEN:[[id number]]]

## 2019-08-11 NOTE — PROGRESS NOTE ADULT - PROBLEM SELECTOR PLAN 1
MRI findings with "Soft tissue ulceration over lateral plantar aspect of the TMA stump with evidence for osteomyelitis involving the underlying 4th and 5th   metatarsal margins. The 4th and 5th metatarsal stumps are also noted to   be longer relative to the remaining metatarsal stump remnants suggesting an aggressive edge."  - S/p OR 8/8 for Left foot revisional TMA w/ PT/Achilles tenotomy and Right foot Hallux Bone Biopsy   - C/w Zosyn per ID recs. OR cultures with MRSA, c/w Vanco   - Will f/u final OR cultures/path

## 2019-08-11 NOTE — DISCHARGE NOTE PROVIDER - CARE PROVIDER_API CALL
Elmer Child)  Infectious Disease; Internal Medicine  [address] [place] [address]  Phone: [phone]  Fax: [phone]  Follow Up Time:     Karthikeyan Lafleur (DPM)  Podiatric Medicine and Surgery  [address] [place] [address]  Phone: [phone]  Fax: [phone]  Follow Up Time: Elmer Child)  Infectious Disease; Internal Medicine  [address] [place] [address]  Phone: [phone]  Fax: [phone]  Follow Up Time:     Karthikeyan Lafleur (DPM)  Podiatric Medicine and Surgery  [address] [place] [address]  Phone: [phone]  Fax: [phone]  Follow Up Time:     Mine Manuel)  Internal Medicine; Medical Oncology  [address] [place] [address]  Phone: [phone]  Fax: [phone]  Follow Up Time:

## 2019-08-11 NOTE — DISCHARGE NOTE PROVIDER - NSDCFUADDINST_GEN_ALL_CORE_FT
Please follow up with Dr. Lafleur within 1 week of discharge from the hospital, please call [phone] for appointment and discuss that you recently were seen in the hospital.  Wound Care: Please leave your dressing clean dry intact until your follow up appointment.  Weight bearing: Please weight bear as tolerated in a surgical shoe.  Antibiotics: Please continue as instructed.

## 2019-08-11 NOTE — DISCHARGE NOTE PROVIDER - NSDCCPCAREPLAN_GEN_ALL_CORE_FT
PRINCIPAL DISCHARGE DIAGNOSIS  Diagnosis: Sepsis  Assessment and Plan of Treatment: Secondary to foot wound. Continue Vancomycin 1250mg daily for a total of 6 weeks, through *****INCOMPLETE  Check Vanco level every 2 days prior to 4th dose and check CBC/CMP daily, send results to infectious disease Dr. Child and CBC/CMP results to oncologist Dr. Manuel.   Follow up with your infectious disease doctor Dr. Child within 1-2 weeks. Follow up with your Podiatrist Dr. Lafleur within 1-2 weeks.      SECONDARY DISCHARGE DIAGNOSES  Diagnosis: Small cell lung cancer  Assessment and Plan of Treatment: Follow up with your oncologist Dr. Manuel for further management within 1-2 weeks of discharge. CBC/CMP weekly, results to be sent to Dr. Chan office.    Diagnosis: Bicytopenia  Assessment and Plan of Treatment: Follow up with your oncologist Dr. Manuel for further management within 1-2 weeks of discharge. CBC/CMP weekly, results to be sent to Dr. Chan office.    Diagnosis: Urinary retention  Assessment and Plan of Treatment: Follow up with Dr. Daugherty as outpatient for further management of urinary retention. [phone]. Maintain dutton pending further urologic work up with Dr. Daugherty. Continue Flomax at bedtime.    Diagnosis: WHITNEY (acute kidney injury)  Assessment and Plan of Treatment: Resolved. Follow up with your primary care physician for further monitoring in 1-2 weeks. Please call to arrange appointment. Avoid medications that may be harmful to your kidneys such as NSAID pain relievers (Advil, Aleve, Motrin, etc.) Use Tylenol for pain if needed. Avoid contrast if you require any medical testing. PRINCIPAL DISCHARGE DIAGNOSIS  Diagnosis: Sepsis  Assessment and Plan of Treatment: Secondary to foot wound. -Continue Vanco 1250mg daily through 9/18/19 via PICC line. Check CBC/CMP/Vanco level weekly at rehab.   Follow up with your Podiatrist Dr. Lafleur within 1 week.  Follow up with your infectious disease doctor Dr. Child within 2 weeks.      SECONDARY DISCHARGE DIAGNOSES  Diagnosis: Small cell lung cancer  Assessment and Plan of Treatment: Follow up with your oncologist Dr. Maneul for further management within 1-2 weeks of discharge. CBC/CMP weekly at rehab. Please send results to Dr. Manuel.    Diagnosis: Bicytopenia  Assessment and Plan of Treatment: Follow up with your oncologist Dr. Manuel for further management within 1-2 weeks of discharge. CBC/CMP weekly at rehab. Please send results to Dr. Manuel.    Diagnosis: Urinary retention  Assessment and Plan of Treatment: Follow up with Dr. Daugherty as outpatient for further management of urinary retention. [phone]. Maintain dutton pending further urologic work up with Dr. Daugherty. Continue Flomax at bedtime.    Diagnosis: WHITNEY (acute kidney injury)  Assessment and Plan of Treatment: Follow up with your primary care physician for further monitoring in 1-2 weeks. Please call to arrange appointment.   Avoid medications that may be harmful to your kidneys such as NSAID pain relievers (Advil, Aleve, Motrin, etc.) Use Tylenol for pain if needed. Avoid contrast if you require any medical testing. PRINCIPAL DISCHARGE DIAGNOSIS  Diagnosis: Sepsis  Assessment and Plan of Treatment: Secondary to foot wound. -Continue Vanco 1250mg daily through 9/18/19 via PICC line. Check CBC/CMP/Vanco level weekly at rehab.   Follow up with your Podiatrist Dr. Lafleur within 1 week.  Follow up with your infectious disease doctor Dr. Child within 2 weeks.      SECONDARY DISCHARGE DIAGNOSES  Diagnosis: Small cell lung cancer  Assessment and Plan of Treatment: Follow up with your oncologist Dr. Manuel for further management within 1-2 weeks of discharge. CBC/CMP weekly at rehab. Please send results to Dr. Manuel.    Diagnosis: Bicytopenia  Assessment and Plan of Treatment: Follow up with your oncologist Dr. Manuel for further management within 1-2 weeks of discharge. CBC/CMP weekly at rehab. Please send results to Dr. Manuel.    Diagnosis: Diabetes mellitus  Assessment and Plan of Treatment: Continue basaglar at bedtime. Metformin was discontinued. Follow up with your primary care physician for further monitoring in 1-2 weeks. Please call to arrange appointment. Continue diet modification. Avoid complex carbohydrates such as bread, pasta, cereal, white rice, white potatoes, etc. Avoid concentrated sugar as found in desserts, candy, soda, juice, etc. Consume a diet based on lean protein (chicken, fish) and vegetables.   Please check you fingersticks every morning, or if you are not feeling well and before meals.  If your fingerstick is >300 x 2 or more readings. Please contact primary doctor or endocrinologist.  If your fingerstick is less than 70 and/or you have symptoms of very low blood sugar, FIRST drink 1/2 cup of apple juice (or take 4 glucose tabs) and recheck fingerstick in 15 minutes. Repeat these steps until blood sugar is above 100, if necessary then call your doctor to discuss low blood sugar.    Diagnosis: Essential hypertension  Assessment and Plan of Treatment: Resume Losartan 50mg daily. Follow up with your primary care physician for further monitoring in 1-2 weeks. Please call to arrange appointment. Low cholesterol diet. Salt restriction. 1800Kcal diabetic diet with carb restriction.    Diagnosis: Urinary retention  Assessment and Plan of Treatment: Follow up with Dr. Daugherty as outpatient for further management of urinary retention. [phone]. Maintain dutton pending further urologic work up with Dr. Daugherty. Continue Flomax at bedtime.    Diagnosis: WHITNEY (acute kidney injury)  Assessment and Plan of Treatment: Follow up with your primary care physician for further monitoring in 1-2 weeks. Please call to arrange appointment.   Avoid medications that may be harmful to your kidneys such as NSAID pain relievers (Advil, Aleve, Motrin, etc.) Use Tylenol for pain if needed. Avoid contrast if you require any medical testing. PRINCIPAL DISCHARGE DIAGNOSIS  Diagnosis: Sepsis  Assessment and Plan of Treatment: Secondary to foot wound. -Continue Vanco 1250mg daily through 9/18/19 via PICC line. Check CBC/CMP/Vanco level weekly at rehab.   Follow up with your Podiatrist Dr. Lafleur within 1 week.  Follow up with your infectious disease doctor Dr. Child within 2 weeks.      SECONDARY DISCHARGE DIAGNOSES  Diagnosis: Diabetes mellitus  Assessment and Plan of Treatment: Continue basaglar at bedtime. Metformin was discontinued. Follow up with your primary care physician for further monitoring in 1-2 weeks. Please call to arrange appointment. Continue diet modification. Avoid complex carbohydrates such as bread, pasta, cereal, white rice, white potatoes, etc. Avoid concentrated sugar as found in desserts, candy, soda, juice, etc. Consume a diet based on lean protein (chicken, fish) and vegetables.   Please check you fingersticks every morning, or if you are not feeling well and before meals.  If your fingerstick is >300 x 2 or more readings. Please contact primary doctor or endocrinologist.  If your fingerstick is less than 70 and/or you have symptoms of very low blood sugar, FIRST drink 1/2 cup of apple juice (or take 4 glucose tabs) and recheck fingerstick in 15 minutes. Repeat these steps until blood sugar is above 100, if necessary then call your doctor to discuss low blood sugar.    Diagnosis: Essential hypertension  Assessment and Plan of Treatment: Resume Losartan 50mg daily. Follow up with your primary care physician for further monitoring in 1-2 weeks. Please call to arrange appointment. Low cholesterol diet. Salt restriction. 1800Kcal diabetic diet with carb restriction.    Diagnosis: WHITNEY (acute kidney injury)  Assessment and Plan of Treatment: Follow up with your primary care physician for further monitoring in 1-2 weeks. Please call to arrange appointment.   Avoid medications that may be harmful to your kidneys such as NSAID pain relievers (Advil, Aleve, Motrin, etc.) Use Tylenol for pain if needed. Avoid contrast if you require any medical testing.    Diagnosis: Small cell lung cancer  Assessment and Plan of Treatment: Follow up with your oncologist Dr. Manuel for further management within 1-2 weeks of discharge. CBC/CMP weekly at rehab. Please send results to Dr. Manuel.    Diagnosis: Bicytopenia  Assessment and Plan of Treatment: Follow up with your oncologist Dr. Manuel for further management within 1-2 weeks of discharge. CBC/CMP weekly at rehab. Please send results to Dr. Manuel.    Diagnosis: Urinary retention  Assessment and Plan of Treatment: Follow up with Dr. Daugherty as outpatient for further management of urinary retention. [phone]. Maintain dutton pending further urologic work up with Dr. Daugherty. Continue Flomax at bedtime.

## 2019-08-11 NOTE — DISCHARGE NOTE PROVIDER - HOSPITAL COURSE
NOTE INCOMPLETE         63M with PMHx of extensive small cell lung cancer, metastatic to bone, and brain followed by Dr. Thao who presented with fever, chills found to have OM of left foot s/p revisional TMA on 8/8. (+) MRSA from wound cx.         1. Sepsis w/ Leukocytosis 2/2 to OM of left foot and +MRSA in foot wound     - Found to have OM of Left foot Wound: MRI findings with "Soft tissue ulceration over lateral plantar aspect of the TMA stump with evidence for osteomyelitis involving the underlying 4th and 5th     metatarsal margins. The 4th and 5th metatarsal stumps are also noted to be longer relative to the remaining metatarsal stump remnants suggesting an aggressive edge." s/p TMA w/ Revision of TMA     - Right foot Plantar Hallux wound - s/p pus debridement and Rt Hallux bone Bx on 8/8, bone biopsy: + staphylococcus Aureus / MRSA    - continue IV Zosyn, while inpatient and IV Vanco, pt w/ PICC placed on 8/15 will need Vanco 750mg q 12 through 9/18/19 on DC.     - RVP neg       - BCx neg    - UCx neg     - CXR clear         2. Malignant neoplasm of lung - unspecified laterality, unspecified part of lung.      - Extensive small cell lung cancer, metastatic to bone, and brain. As per OP records, Dr. Thao discussed with patient/daughter CAT scan results in detail. Explained gradually progressive disease, incurability, and palliative treatment intent. Discussed treatment options at this point, including palliative care/best supportive care/hospice. Patient/daughter wished to continue with treatment at this time.     - holding chemo in setting of possible sepsis, currently on monotherapy with Gemzar for now - close F/U at [facility] upon DC.     - House Heme/Onc was following        3. Uncontrolled DM - HbA1C: 10%    - -cont. Lantus to 30U ( home is 50 unit, tailor according to blood sugars)    -cont.  pre-meal 4U Humalog 3X/day    - diabetic diet.        4. Essential hypertension     - BP low-normal, no indication for BP meds inpatient         5. Chronic kidney disease - unspecified CKD stage.     - stable, Cr 1.27        6. Urinary Retention     - started on Flomax    Straight cath protocol started 700cc on bladder scan-900cc came out, then 619 and 700 out, then 916 bladder scan out 950, bladder scan 520 dutton replaced 8/12    - House Urology was following, back to rehab w/ dutton     - Might need outpt urology f/u         7. Thrombocytopenia    - s/p 1 U platelets, count now within normal limits. NOTE INCOMPLETE         63M with PMHx of extensive small cell lung cancer, metastatic to bone, and brain followed by Dr. Thao who presented with fever, chills found to have OM of left foot s/p revisional TMA on 8/8. (+) MRSA from wound cx.         1. Sepsis w/ Leukocytosis 2/2 to OM of left foot and +MRSA in foot wound     - Found to have OM of Left foot Wound: MRI findings with "Soft tissue ulceration over lateral plantar aspect of the TMA stump with evidence for osteomyelitis involving the underlying 4th and 5th     metatarsal margins. The 4th and 5th metatarsal stumps are also noted to be longer relative to the remaining metatarsal stump remnants suggesting an aggressive edge." s/p TMA w/ Revision of TMA     - Right foot Plantar Hallux wound - s/p pus debridement and Rt Hallux bone Bx on 8/8, bone biopsy: + staphylococcus Aureus / MRSA    - continue IV Zosyn, while inpatient and IV Vanco    - pt w/ PICC placed on 8/15 will need Vanco 750mg q 12 through 9/18/19 on DC with CBC/CMP/Vanco level weekly     - RVP neg       - BCx neg    - UCx neg     - CXR clear         2. Malignant neoplasm of lung - unspecified laterality, unspecified part of lung.      - Extensive small cell lung cancer, metastatic to bone, and brain. As per OP records, Dr. Thao discussed with patient/daughter CAT scan results in detail. Explained gradually progressive disease, incurability, and palliative treatment intent. Discussed treatment options at this point, including palliative care/best supportive care/hospice. Patient/daughter wished to continue with treatment at this time.     - holding chemo in setting of possible sepsis, currently on monotherapy with Gemzar for now - close F/U at [facility].     - House Heme/Onc was following        3. Uncontrolled DM - HbA1C: 10%    - -cont. Lantus to 30U ( home is 50 unit, tailor according to blood sugars)    -cont.  pre-meal 4U Humalog 3X/day    - diabetic diet.        4. Essential hypertension     - BP low-normal, no indication for BP meds inpatient         5. Chronic kidney disease - unspecified CKD stage.     - stable, Cr 1.27        6. Urinary Retention     - started on Flomax    - House Urology was following, back to rehab w/ dutton     - Might need outpt urology f/u         7. Anemia and Thrombocytopenia    - likely myelosuppression from Gemzar, recently given on last week     - s/p 1 U platelets, count now within normal limits. 63M with PMHx of extensive small cell lung cancer, metastatic to bone, and brain followed by Dr. Thao who presented with fever, chills found to have OM of left foot s/p revisional TMA on 8/8. (+) MRSA from wound cx.         1. Sepsis w/ Leukocytosis 2/2 to OM of left foot and +MRSA in foot wound     - Found to have OM of Left foot Wound: MRI findings with "Soft tissue ulceration over lateral plantar aspect of the TMA stump with evidence for osteomyelitis involving the underlying 4th and 5th     metatarsal margins. The 4th and 5th metatarsal stumps are also noted to be longer relative to the remaining metatarsal stump remnants suggesting an aggressive edge." s/p TMA w/ Revision of TMA     - Right foot Plantar Hallux wound - s/p pus debridement and Rt Hallux bone Bx on 8/8, bone biopsy: + staphylococcus Aureus / MRSA    - continue IV Zosyn, while inpatient and IV Vanco    - pt w/ PICC placed on 8/15 will need Vanco 750mg q 12 through 9/18/19 on DC with CBC/CMP/Vanco level weekly     - RVP neg, - BCx neg, - UCx neg,- CXR clear         2. Malignant neoplasm of lung - unspecified laterality, unspecified part of lung.      - Extensive small cell lung cancer, metastatic to bone, and brain. As per OP records, Dr. Thao discussed with patient/daughter CAT scan results in detail. Explained gradually progressive disease, incurability, and palliative treatment intent. Discussed treatment options at this point, including palliative care/best supportive care/hospice. Patient/daughter wished to continue with treatment at this time.     - holding chemo in setting of possible sepsis, currently on monotherapy with Gemzar for now - close F/U at [facility] upon DC.     - House Heme/Onc was following        3. Uncontrolled DM - HbA1C: 10%    - -cont. Lantus to 30U ( home is 50 unit, tailor according to blood sugars)    -cont.  pre-meal 4U Humalog 3X/day    - diabetic diet.        4. Essential hypertension     - BP low-normal, no indication for BP meds inpatient         5. Chronic kidney disease - unspecified CKD stage.     - stable, Cr 1.27        6. Urinary Retention     - started on Flomax    - House Urology was following, back to rehab w/ dutton     - outpt urology f/u         7. Anemia and Thrombocytopenia    - likely myelosuppression from Gemzar, recently given on last week     - s/p 1 U platelets, count now within normal limits.         Pt had PICC line placed 8/15 for total of 6 weeks of antibiotics. Case discussed with Dr. Ordoñez, labs/vitals reviewed, Pt medically cleared for discharge to rehab on Vanco 750mg BID via PICC line x 6 weeks, rec vanco level every 2 days and CBC/CMP weekly to be sent to infectious disease MD Dr. Child. 63M with PMHx of extensive small cell lung cancer, metastatic to bone, and brain followed by Dr. Thao who presented with fever, chills found to have OM of left foot s/p revisional TMA on 8/8. (+) MRSA from wound cx.         1. Sepsis w/ Leukocytosis 2/2 to OM of left foot and +MRSA in foot wound     - Found to have OM of Left foot Wound: MRI findings with "Soft tissue ulceration over lateral plantar aspect of the TMA stump with evidence for osteomyelitis involving the underlying 4th and 5th     metatarsal margins. The 4th and 5th metatarsal stumps are also noted to be longer relative to the remaining metatarsal stump remnants suggesting an aggressive edge." s/p TMA w/ Revision of TMA     - Right foot Plantar Hallux wound - s/p pus debridement and Rt Hallux bone Bx on 8/8, bone biopsy: + staphylococcus Aureus / MRSA    - continue IV Zosyn, while inpatient and IV Vanco    - pt w/ PICC placed on 8/15 will need Vanco 1250mg daily through 9/18/19 on DC with CBC/CMP/Vanco level weekly     - RVP neg, - BCx neg, - UCx neg,- CXR clear         2. Malignant neoplasm of lung - unspecified laterality, unspecified part of lung.      - Extensive small cell lung cancer, metastatic to bone, and brain. As per OP records, Dr. Thao discussed with patient/daughter CAT scan results in detail. Explained gradually progressive disease, incurability, and palliative treatment intent. Discussed treatment options at this point, including palliative care/best supportive care/hospice. Patient/daughter wished to continue with treatment at this time.     - holding chemo in setting of possible sepsis, currently on monotherapy with Gemzar for now - close F/U at [facility] upon DC.     - House Heme/Onc was following        3. Uncontrolled DM - HbA1C: 10%    - -cont. Lantus to 30U ( home is 50 unit, tailor according to blood sugars)    -cont.  pre-meal 4U Humalog 3X/day    - diabetic diet.        4. Essential hypertension     - BP low-normal, no indication for BP meds inpatient         5. Chronic kidney disease - unspecified CKD stage.     - stable, Cr 1.27        6. Urinary Retention     - started on Flomax    - House Urology was following, back to rehab w/ dutton     - outpt urology f/u         7. Anemia and Thrombocytopenia    - likely myelosuppression from Gemzar, recently given on last week     - s/p 1 U platelets, count now within normal limits.         Pt had PICC line placed 8/15 for total of 6 weeks of antibiotics, d/w ID Dr. Child recommend Vanco 1250mg daily, monitor vanco level weekly at rehab. Case discussed with Dr. Ordoñez, labs/vitals reviewed, Pt medically cleared for discharge to rehab. 63M with PMHx of extensive small cell lung cancer, metastatic to bone, and brain followed by Dr. Thao who presented with fever, chills found to have OM of left foot s/p revisional TMA on 8/8. (+) MRSA from wound cx.         1. Sepsis w/ Leukocytosis 2/2 to OM of left foot and +MRSA in foot wound     - Found to have OM of Left foot Wound: MRI findings with "Soft tissue ulceration over lateral plantar aspect of the TMA stump with evidence for osteomyelitis involving the underlying 4th and 5th     metatarsal margins. The 4th and 5th metatarsal stumps are also noted to be longer relative to the remaining metatarsal stump remnants suggesting an aggressive edge." s/p TMA w/ Revision of TMA     - Right foot Plantar Hallux wound - s/p pus debridement and Rt Hallux bone Bx on 8/8, bone biopsy: + staphylococcus Aureus / MRSA    - continue IV Zosyn, while inpatient and IV Vanco    - pt w/ PICC placed on 8/15 will need Vanco 1250mg daily through 9/18/19 on DC with CBC/CMP/Vanco level weekly     - RVP neg, - BCx neg, - UCx neg,- CXR clear         2. Malignant neoplasm of lung - unspecified laterality, unspecified part of lung.      - Extensive small cell lung cancer, metastatic to bone, and brain. As per OP records, Dr. Thao discussed with patient/daughter CAT scan results in detail. Explained gradually progressive disease, incurability, and palliative treatment intent. Discussed treatment options at this point, including palliative care/best supportive care/hospice. Patient/daughter wished to continue with treatment at this time.     - holding chemo in setting of possible sepsis, currently on monotherapy with Gemzar for now - close F/U at [facility] upon DC.     - House Heme/Onc was following        3. Uncontrolled DM - HbA1C: 10%    - -cont. Lantus to 30U ( home is 50 unit, tailor according to blood sugars)    -cont.  pre-meal 4U Humalog 3X/day    - diabetic diet.        4. Essential hypertension     - BP medications held on admission, now 150/80mmhg, d/w Dr. Ordoñez, resume Losartan 50mg daily.         5. Chronic kidney disease    - stable, Cr 1.27-1.34        6. Urinary Retention     - started on Flomax    - House Urology was following, back to rehab w/ dutton     - outpt urology f/u         7. Anemia and Thrombocytopenia    - likely myelosuppression from Gemzar, recently given on last week     - s/p 1 U platelets, count now within normal limits.         Pt had PICC line placed 8/15 for total of 6 weeks of antibiotics, d/w ID Dr. Child recommend Vanco 1250mg daily, monitor vanco level weekly at rehab. Case discussed with Dr. Ordoñez, labs/vitals/medications reviewed, c/w just Basaglar on discharge for DM, resume losartan 50mg daily, c/w other current medications, Pt medically cleared for discharge to rehab. 63M with PMHx of extensive small cell lung cancer, metastatic to bone, and brain followed by Dr. Thao who presented with fever, chills found to have OM of left foot s/p revisional TMA on 8/8. (+) MRSA from wound cx.         1. Sepsis w/ Leukocytosis 2/2 to OM of left foot and +MRSA in foot wound     - Found to have OM of Left foot Wound: MRI findings with "Soft tissue ulceration over lateral plantar aspect of the TMA stump with evidence for osteomyelitis involving the underlying 4th and 5th     metatarsal margins. The 4th and 5th metatarsal stumps are also noted to be longer relative to the remaining metatarsal stump remnants suggesting an aggressive edge." s/p TMA w/ Revision of TMA     - Right foot Plantar Hallux wound - s/p pus debridement and Rt Hallux bone Bx on 8/8, bone biopsy: + staphylococcus Aureus / MRSA    - continue IV Zosyn, while inpatient and IV Vanco    - pt w/ PICC placed on 8/15 will need Vanco 1250mg daily through 9/18/19 on DC with CBC/CMP/Vanco level weekly     - RVP neg, - BCx neg, - UCx neg,- CXR clear         2. Malignant neoplasm of lung - unspecified laterality, unspecified part of lung.      - Extensive small cell lung cancer, metastatic to bone, and brain. As per OP records, Dr. Thao discussed with patient/daughter CAT scan results in detail. Explained gradually progressive disease, incurability, and palliative treatment intent. Discussed treatment options at this point, including palliative care/best supportive care/hospice. Patient/daughter wished to continue with treatment at this time.     - holding chemo in setting of possible sepsis, currently on monotherapy with Gemzar for now - close F/U at [facility] upon DC.     - House Heme/Onc was following        3. Uncontrolled DM - HbA1C: 10%    - -cont. Lantus to 30U ( home is 50 unit, tailor according to blood sugars)    -cont.  pre-meal 4U Humalog 3X/day    - diabetic diet.        4. Essential hypertension     - BP medications held on admission, now 150/80mmhg, d/w Dr. Ordoñez, resume Losartan 50mg daily.         5. Chronic kidney disease    - stable, Cr 1.27-1.34        6. Urinary Retention     - started on Flomax    - House Urology was following, back to rehab w/ dutton     - outpt urology f/u         7. Anemia and Thrombocytopenia    - likely myelosuppression from Gemzar, recently given on last week     - s/p 1 U platelets, count now within normal limits.         Pt had PICC line placed 8/15 for total of 6 weeks of antibiotics, d/w ID Dr. Child recommend Vanco 1250mg daily, monitor vanco level weekly at rehab. Case discussed with Dr. Raya, labs/vitals/medications reviewed, c/w Basaglar on discharge for DM, resume losartan 50mg daily, c/w other current medications, Pt medically cleared for discharge to rehab. 63M with PMHx of extensive small cell lung cancer, metastatic to bone, and brain followed by Dr. Thao who presented with fever, chills found to have OM of left foot s/p revisional TMA on 8/8. (+) MRSA from wound cx.         Sepsis 2/2 to OM of left foot and +MRSA in foot wound - Pt Found to have OM of Left foot Wound: MRI findings with "Soft tissue ulceration over lateral plantar aspect of the TMA stump with evidence for osteomyelitis involving the underlying 4th and 5th metatarsal margins. The 4th and 5th metatarsal stumps are also noted to be longer relative to the remaining metatarsal stump remnants suggesting an aggressive edge." s/p TMA w/ Revision of TMA . Pt also with Right foot Plantar Hallux wound s/p pus debridement and Rt Hallux bone Bx on 8/8 w/ bone biopsy: + staphylococcus Aureus / MRSA. Pt was initially  treated with vanc and Zosyn. He was then transitioned to monotherapy with vancomycin based on the cx data.     Pt s/p  PICC placement on 8/15 will need Vanco (for 6weeks)1250mg daily through 9/18/19. On DC pt should have  CBC/CMP/Vanco level weekly             Malignant neoplasm of lung - Pt with extensive small cell lung cancer, metastatic to bone, and brain. As per OP records, Dr. Thao discussed with patient/daughter CAT scan results in detail. Explained gradually progressive disease, incurability, and palliative treatment intent. During course Discussed treatment options including palliative care/best supportive care/hospice. Patient/daughter wished to continue with treatment at this time. However, further chemo being held for now  in setting of  infection. Pt seen by house heme/onc and will  f/u F/U at [facility].             Other comorbidities were managed during admission. Of note pt had Anemia and Thrombocytopenia likely myelosuppression from Gemzar, recently given week PTA and pt s/p 1 U platelets. Course was also c/b     Urinary Retention. Pt was started on Flomax. House Urology evaluated pt and pt will go to rehab w/ nato with outpatient urology f/u.             Pt had PICC line placed 8/15 for total of 6 weeks of antibiotics, d/w ID Dr. Child recommend Vanco 1250mg daily, monitor vanco level weekly at rehab. C/w Basaglar on discharge for DM, resume losartan 50mg daily, c/w other current medications, Pt medically cleared for discharge to rehab.

## 2019-08-11 NOTE — DISCHARGE NOTE PROVIDER - NSDCHC_MEDRECSTATUS_GEN_ALL_CORE
Admission Reconciliation is Not Complete  Discharge Reconciliation is Not Complete Admission Reconciliation is Not Complete  Discharge Reconciliation is Completed Admission Reconciliation is Completed  Discharge Reconciliation is Completed Admission Reconciliation is Completed  Discharge Reconciliation is Not Complete

## 2019-08-11 NOTE — PROGRESS NOTE ADULT - REASON FOR ADMISSION
OM
fever, osteomyelitis, small cell lung cancer, metastatic to bone and brain
Dehisced left foot TMA, Right foot hallux wound to bone
RF hallux wound, Left foot TMA with open wound
Right foot hallux wound, Left foot wound to TMA surgical site
OM
fever
fever

## 2019-08-11 NOTE — DISCHARGE NOTE PROVIDER - CARE PROVIDERS DIRECT ADDRESSES
[email or web address]

## 2019-08-11 NOTE — DISCHARGE NOTE PROVIDER - NSDCFUSCHEDAPPT_GEN_ALL_CORE_FT
MD EVONNE [facility] ; 08/13/2019 ; NPP [facility] [address] [place] [address] MD GEORGINA DOUGLASS ; 09/04/2019 ; HARLAN Baum

## 2019-08-12 ENCOUNTER — APPOINTMENT (OUTPATIENT)
Dept: HEMATOLOGY ONCOLOGY | Facility: CLINIC | Age: 64
End: 2019-08-12

## 2019-08-12 ENCOUNTER — APPOINTMENT (OUTPATIENT)
Dept: NEUROSURGERY | Facility: CLINIC | Age: 64
End: 2019-08-12

## 2019-08-12 LAB
-  CEFAZOLIN: SIGNIFICANT CHANGE UP
-  CEFAZOLIN: SIGNIFICANT CHANGE UP
-  CIPROFLOXACIN: SIGNIFICANT CHANGE UP
-  CIPROFLOXACIN: SIGNIFICANT CHANGE UP
-  CLINDAMYCIN: SIGNIFICANT CHANGE UP
-  CLINDAMYCIN: SIGNIFICANT CHANGE UP
-  DAPTOMYCIN: SIGNIFICANT CHANGE UP
-  ERYTHROMYCIN: SIGNIFICANT CHANGE UP
-  ERYTHROMYCIN: SIGNIFICANT CHANGE UP
-  GENTAMICIN: SIGNIFICANT CHANGE UP
-  GENTAMICIN: SIGNIFICANT CHANGE UP
-  LEVOFLOXACIN: SIGNIFICANT CHANGE UP
-  LEVOFLOXACIN: SIGNIFICANT CHANGE UP
-  LINEZOLID: SIGNIFICANT CHANGE UP
-  MOXIFLOXACIN(AEROBIC): SIGNIFICANT CHANGE UP
-  MOXIFLOXACIN(AEROBIC): SIGNIFICANT CHANGE UP
-  OXACILLIN: SIGNIFICANT CHANGE UP
-  OXACILLIN: SIGNIFICANT CHANGE UP
-  PENICILLIN: SIGNIFICANT CHANGE UP
-  PENICILLIN: SIGNIFICANT CHANGE UP
-  RIFAMPIN.: SIGNIFICANT CHANGE UP
-  RIFAMPIN.: SIGNIFICANT CHANGE UP
-  TETRACYCLINE: SIGNIFICANT CHANGE UP
-  TETRACYCLINE: SIGNIFICANT CHANGE UP
-  TRIMETHOPRIM/SULFAMETHOXAZOLE: SIGNIFICANT CHANGE UP
-  TRIMETHOPRIM/SULFAMETHOXAZOLE: SIGNIFICANT CHANGE UP
-  VANCOMYCIN: SIGNIFICANT CHANGE UP
-  VANCOMYCIN: SIGNIFICANT CHANGE UP
ANION GAP SERPL CALC-SCNC: 11 MMO/L — SIGNIFICANT CHANGE UP (ref 7–14)
BUN SERPL-MCNC: 11 MG/DL — SIGNIFICANT CHANGE UP (ref 7–23)
CALCIUM SERPL-MCNC: 8.5 MG/DL — SIGNIFICANT CHANGE UP (ref 8.4–10.5)
CHLORIDE SERPL-SCNC: 101 MMOL/L — SIGNIFICANT CHANGE UP (ref 98–107)
CO2 SERPL-SCNC: 27 MMOL/L — SIGNIFICANT CHANGE UP (ref 22–31)
CREAT SERPL-MCNC: 1.25 MG/DL — SIGNIFICANT CHANGE UP (ref 0.5–1.3)
CULTURE - SURGICAL SITE: SIGNIFICANT CHANGE UP
GLUCOSE BLDC GLUCOMTR-MCNC: 130 MG/DL — HIGH (ref 70–99)
GLUCOSE BLDC GLUCOMTR-MCNC: 134 MG/DL — HIGH (ref 70–99)
GLUCOSE BLDC GLUCOMTR-MCNC: 143 MG/DL — HIGH (ref 70–99)
GLUCOSE BLDC GLUCOMTR-MCNC: 170 MG/DL — HIGH (ref 70–99)
GLUCOSE BLDC GLUCOMTR-MCNC: 196 MG/DL — HIGH (ref 70–99)
GLUCOSE SERPL-MCNC: 173 MG/DL — HIGH (ref 70–99)
GRAM STN WND: SIGNIFICANT CHANGE UP
HCT VFR BLD CALC: 24.3 % — LOW (ref 39–50)
HGB BLD-MCNC: 7.7 G/DL — LOW (ref 13–17)
MAGNESIUM SERPL-MCNC: 2 MG/DL — SIGNIFICANT CHANGE UP (ref 1.6–2.6)
MCHC RBC-ENTMCNC: 30.3 PG — SIGNIFICANT CHANGE UP (ref 27–34)
MCHC RBC-ENTMCNC: 31.7 % — LOW (ref 32–36)
MCV RBC AUTO: 95.7 FL — SIGNIFICANT CHANGE UP (ref 80–100)
METHOD TYPE: SIGNIFICANT CHANGE UP
METHOD TYPE: SIGNIFICANT CHANGE UP
NRBC # FLD: 0 K/UL — SIGNIFICANT CHANGE UP (ref 0–0)
ORGANISM # SPEC MICROSCOPIC CNT: SIGNIFICANT CHANGE UP
PLATELET # BLD AUTO: 250 K/UL — SIGNIFICANT CHANGE UP (ref 150–400)
PMV BLD: 10 FL — SIGNIFICANT CHANGE UP (ref 7–13)
POTASSIUM SERPL-MCNC: 3.9 MMOL/L — SIGNIFICANT CHANGE UP (ref 3.5–5.3)
POTASSIUM SERPL-SCNC: 3.9 MMOL/L — SIGNIFICANT CHANGE UP (ref 3.5–5.3)
RBC # BLD: 2.54 M/UL — LOW (ref 4.2–5.8)
RBC # FLD: 14.6 % — HIGH (ref 10.3–14.5)
SODIUM SERPL-SCNC: 139 MMOL/L — SIGNIFICANT CHANGE UP (ref 135–145)
VANCOMYCIN TROUGH SERPL-MCNC: 18.9 UG/ML — SIGNIFICANT CHANGE UP (ref 10–20)
WBC # BLD: 5.89 K/UL — SIGNIFICANT CHANGE UP (ref 3.8–10.5)
WBC # FLD AUTO: 5.89 K/UL — SIGNIFICANT CHANGE UP (ref 3.8–10.5)

## 2019-08-12 PROCEDURE — 99233 SBSQ HOSP IP/OBS HIGH 50: CPT

## 2019-08-12 RX ORDER — PIPERACILLIN AND TAZOBACTAM 4; .5 G/20ML; G/20ML
3.38 INJECTION, POWDER, LYOPHILIZED, FOR SOLUTION INTRAVENOUS EVERY 8 HOURS
Refills: 0 | Status: DISCONTINUED | OUTPATIENT
Start: 2019-08-12 | End: 2019-08-16

## 2019-08-12 RX ADMIN — Medication 100 MILLIGRAM(S): at 13:18

## 2019-08-12 RX ADMIN — Medication 4 UNIT(S): at 09:03

## 2019-08-12 RX ADMIN — TAMSULOSIN HYDROCHLORIDE 0.4 MILLIGRAM(S): 0.4 CAPSULE ORAL at 21:28

## 2019-08-12 RX ADMIN — POLYETHYLENE GLYCOL 3350 17 GRAM(S): 17 POWDER, FOR SOLUTION ORAL at 13:18

## 2019-08-12 RX ADMIN — SIMVASTATIN 5 MILLIGRAM(S): 20 TABLET, FILM COATED ORAL at 21:28

## 2019-08-12 RX ADMIN — Medication 1: at 17:52

## 2019-08-12 RX ADMIN — PIPERACILLIN AND TAZOBACTAM 25 GRAM(S): 4; .5 INJECTION, POWDER, LYOPHILIZED, FOR SOLUTION INTRAVENOUS at 06:45

## 2019-08-12 RX ADMIN — INSULIN GLARGINE 30 UNIT(S): 100 INJECTION, SOLUTION SUBCUTANEOUS at 21:27

## 2019-08-12 RX ADMIN — FAMOTIDINE 40 MILLIGRAM(S): 10 INJECTION INTRAVENOUS at 21:30

## 2019-08-12 RX ADMIN — Medication 250 MILLIGRAM(S): at 13:21

## 2019-08-12 RX ADMIN — Medication 250 MILLIGRAM(S): at 03:04

## 2019-08-12 RX ADMIN — CHLORHEXIDINE GLUCONATE 1 APPLICATION(S): 213 SOLUTION TOPICAL at 13:19

## 2019-08-12 RX ADMIN — Medication 4 UNIT(S): at 17:52

## 2019-08-12 RX ADMIN — PIPERACILLIN AND TAZOBACTAM 25 GRAM(S): 4; .5 INJECTION, POWDER, LYOPHILIZED, FOR SOLUTION INTRAVENOUS at 13:18

## 2019-08-12 RX ADMIN — Medication 4 UNIT(S): at 13:17

## 2019-08-12 RX ADMIN — PIPERACILLIN AND TAZOBACTAM 25 GRAM(S): 4; .5 INJECTION, POWDER, LYOPHILIZED, FOR SOLUTION INTRAVENOUS at 21:27

## 2019-08-12 NOTE — PROGRESS NOTE ADULT - ASSESSMENT
63M with PMHx of extensive small cell lung cancer, metastatic to bone, and brain followed by Dr. Thao who presents with fever, chills found to have OM of left foot s/p revisional TMA on 8/8. (+) MRSA from wound cx.

## 2019-08-12 NOTE — PROGRESS NOTE ADULT - SUBJECTIVE AND OBJECTIVE BOX
Patient is a 63y old  Male who presents with a chief complaint of OM (11 Aug 2019 14:05)      SUBJECTIVE / OVERNIGHT EVENTS: No complaints.     MEDICATIONS  (STANDING):  chlorhexidine 4% Liquid 1 Application(s) Topical daily  dextrose 5%. 1000 milliLiter(s) (50 mL/Hr) IV Continuous <Continuous>  dextrose 50% Injectable 12.5 Gram(s) IV Push once  dextrose 50% Injectable 25 Gram(s) IV Push once  dextrose 50% Injectable 25 Gram(s) IV Push once  docusate sodium 100 milliGRAM(s) Oral three times a day  famotidine    Tablet 40 milliGRAM(s) Oral at bedtime  insulin glargine Injectable (LANTUS) 30 Unit(s) SubCutaneous at bedtime  insulin lispro (HumaLOG) corrective regimen sliding scale   SubCutaneous three times a day before meals  insulin lispro Injectable (HumaLOG) 4 Unit(s) SubCutaneous three times a day before meals  piperacillin/tazobactam IVPB.. 3.375 Gram(s) IV Intermittent every 8 hours  polyethylene glycol 3350 17 Gram(s) Oral daily  senna 2 Tablet(s) Oral at bedtime  simvastatin 5 milliGRAM(s) Oral at bedtime  tamsulosin 0.4 milliGRAM(s) Oral at bedtime  vancomycin  IVPB 1000 milliGRAM(s) IV Intermittent every 12 hours    MEDICATIONS  (PRN):  acetaminophen   Tablet .. 650 milliGRAM(s) Oral every 6 hours PRN Mild Pain (1 - 3)  dextrose 40% Gel 15 Gram(s) Oral once PRN Blood Glucose LESS THAN 70 milliGRAM(s)/deciliter  glucagon  Injectable 1 milliGRAM(s) IntraMuscular once PRN Glucose LESS THAN 70 milligrams/deciliter  HYDROmorphone  Injectable 0.5 milliGRAM(s) IV Push every 4 hours PRN Severe Pain (7 - 10)  oxyCODONE    5 mG/acetaminophen 325 mG 1 Tablet(s) Oral every 4 hours PRN Moderate Pain (4 - 6)      Vital Signs Last 24 Hrs  T(C): 37 (12 Aug 2019 12:29), Max: 37.2 (12 Aug 2019 06:43)  T(F): 98.6 (12 Aug 2019 12:29), Max: 98.9 (12 Aug 2019 06:43)  HR: 70 (12 Aug 2019 12:29) (68 - 73)  BP: 147/70 (12 Aug 2019 12:29) (123/55 - 147/70)  BP(mean): --  RR: 17 (12 Aug 2019 12:29) (17 - 17)  SpO2: 98% (12 Aug 2019 12:29) (95% - 98%)  CAPILLARY BLOOD GLUCOSE      POCT Blood Glucose.: 130 mg/dL (12 Aug 2019 12:24)  POCT Blood Glucose.: 196 mg/dL (12 Aug 2019 09:01)  POCT Blood Glucose.: 134 mg/dL (12 Aug 2019 07:42)  POCT Blood Glucose.: 140 mg/dL (11 Aug 2019 22:41)  POCT Blood Glucose.: 214 mg/dL (11 Aug 2019 17:23)    I&O's Summary    11 Aug 2019 07:01  -  12 Aug 2019 07:00  --------------------------------------------------------  IN: 0 mL / OUT: 2725 mL / NET: -2725 mL        PHYSICAL EXAM:  GENERAL: NAD, well-developed  HEAD:  Atraumatic, Normocephalic  EYES: EOMI, PERRLA, conjunctiva and sclera clear  NECK: Supple, No JVD  CHEST/LUNG: Clear to auscultation bilaterally; No wheeze  HEART: Regular rate and rhythm; No murmurs, rubs, or gallops  ABDOMEN: Soft, Nontender, Nondistended; Bowel sounds present  EXTREMITIES:  (+) dressing b/l feet  PSYCH: AAOx3  NEUROLOGY: non-focal  SKIN: No rashes or lesions    LABS:                        7.7    5.89  )-----------( 250      ( 12 Aug 2019 04:20 )             24.3     08-12    139  |  101  |  11  ----------------------------<  173<H>  3.9   |  27  |  1.25    Ca    8.5      12 Aug 2019 04:20  Mg     2.0     08-12                RADIOLOGY & ADDITIONAL TESTS:    Imaging Personally Reviewed:    Consultant(s) Notes Reviewed:  ID, Podiatry    Care Discussed with Consultants/Other Providers:

## 2019-08-13 ENCOUNTER — APPOINTMENT (OUTPATIENT)
Dept: RADIATION ONCOLOGY | Facility: CLINIC | Age: 64
End: 2019-08-13

## 2019-08-13 LAB
ANION GAP SERPL CALC-SCNC: 11 MMO/L — SIGNIFICANT CHANGE UP (ref 7–14)
BASOPHILS # BLD AUTO: 0.03 K/UL — SIGNIFICANT CHANGE UP (ref 0–0.2)
BASOPHILS NFR BLD AUTO: 0.6 % — SIGNIFICANT CHANGE UP (ref 0–2)
BASOPHILS NFR SPEC: 0 % — SIGNIFICANT CHANGE UP (ref 0–2)
BLASTS # FLD: 0 % — SIGNIFICANT CHANGE UP (ref 0–0)
BUN SERPL-MCNC: 9 MG/DL — SIGNIFICANT CHANGE UP (ref 7–23)
CALCIUM SERPL-MCNC: 8.7 MG/DL — SIGNIFICANT CHANGE UP (ref 8.4–10.5)
CHLORIDE SERPL-SCNC: 102 MMOL/L — SIGNIFICANT CHANGE UP (ref 98–107)
CO2 SERPL-SCNC: 27 MMOL/L — SIGNIFICANT CHANGE UP (ref 22–31)
CREAT SERPL-MCNC: 1.25 MG/DL — SIGNIFICANT CHANGE UP (ref 0.5–1.3)
EOSINOPHIL # BLD AUTO: 0.18 K/UL — SIGNIFICANT CHANGE UP (ref 0–0.5)
EOSINOPHIL NFR BLD AUTO: 3.5 % — SIGNIFICANT CHANGE UP (ref 0–6)
EOSINOPHIL NFR FLD: 3.5 % — SIGNIFICANT CHANGE UP (ref 0–6)
GLUCOSE BLDC GLUCOMTR-MCNC: 123 MG/DL — HIGH (ref 70–99)
GLUCOSE BLDC GLUCOMTR-MCNC: 128 MG/DL — HIGH (ref 70–99)
GLUCOSE BLDC GLUCOMTR-MCNC: 190 MG/DL — HIGH (ref 70–99)
GLUCOSE BLDC GLUCOMTR-MCNC: 252 MG/DL — HIGH (ref 70–99)
GLUCOSE BLDC GLUCOMTR-MCNC: 310 MG/DL — HIGH (ref 70–99)
GLUCOSE SERPL-MCNC: 122 MG/DL — HIGH (ref 70–99)
HCT VFR BLD CALC: 28.3 % — LOW (ref 39–50)
HGB BLD-MCNC: 9.1 G/DL — LOW (ref 13–17)
IMM GRANULOCYTES NFR BLD AUTO: 0.6 % — SIGNIFICANT CHANGE UP (ref 0–1.5)
LYMPHOCYTES # BLD AUTO: 0.97 K/UL — LOW (ref 1–3.3)
LYMPHOCYTES # BLD AUTO: 18.9 % — SIGNIFICANT CHANGE UP (ref 13–44)
LYMPHOCYTES NFR SPEC AUTO: 14.8 % — SIGNIFICANT CHANGE UP (ref 13–44)
MACROCYTES BLD QL: SIGNIFICANT CHANGE UP
MAGNESIUM SERPL-MCNC: 2.1 MG/DL — SIGNIFICANT CHANGE UP (ref 1.6–2.6)
MCHC RBC-ENTMCNC: 31 PG — SIGNIFICANT CHANGE UP (ref 27–34)
MCHC RBC-ENTMCNC: 32.2 % — SIGNIFICANT CHANGE UP (ref 32–36)
MCV RBC AUTO: 96.3 FL — SIGNIFICANT CHANGE UP (ref 80–100)
METAMYELOCYTES # FLD: 0 % — SIGNIFICANT CHANGE UP (ref 0–1)
MICROCYTES BLD QL: SLIGHT — SIGNIFICANT CHANGE UP
MONOCYTES # BLD AUTO: 1.13 K/UL — HIGH (ref 0–0.9)
MONOCYTES NFR BLD AUTO: 22 % — HIGH (ref 2–14)
MONOCYTES NFR BLD: 13.9 % — HIGH (ref 2–9)
MYELOCYTES NFR BLD: 0.8 % — HIGH (ref 0–0)
NEUTROPHIL AB SER-ACNC: 62.6 % — SIGNIFICANT CHANGE UP (ref 43–77)
NEUTROPHILS # BLD AUTO: 2.79 K/UL — SIGNIFICANT CHANGE UP (ref 1.8–7.4)
NEUTROPHILS NFR BLD AUTO: 54.4 % — SIGNIFICANT CHANGE UP (ref 43–77)
NEUTS BAND # BLD: 0 % — SIGNIFICANT CHANGE UP (ref 0–6)
NRBC # FLD: 0.03 K/UL — SIGNIFICANT CHANGE UP (ref 0–0)
OTHER - HEMATOLOGY %: 0 — SIGNIFICANT CHANGE UP
OVALOCYTES BLD QL SMEAR: SLIGHT — SIGNIFICANT CHANGE UP
PHOSPHATE SERPL-MCNC: 3 MG/DL — SIGNIFICANT CHANGE UP (ref 2.5–4.5)
PLATELET # BLD AUTO: 335 K/UL — SIGNIFICANT CHANGE UP (ref 150–400)
PLATELET COUNT - ESTIMATE: NORMAL — SIGNIFICANT CHANGE UP
PMV BLD: 9.6 FL — SIGNIFICANT CHANGE UP (ref 7–13)
POLYCHROMASIA BLD QL SMEAR: SLIGHT — SIGNIFICANT CHANGE UP
POTASSIUM SERPL-MCNC: 3.7 MMOL/L — SIGNIFICANT CHANGE UP (ref 3.5–5.3)
POTASSIUM SERPL-SCNC: 3.7 MMOL/L — SIGNIFICANT CHANGE UP (ref 3.5–5.3)
PROMYELOCYTES # FLD: 0 % — SIGNIFICANT CHANGE UP (ref 0–0)
RBC # BLD: 2.94 M/UL — LOW (ref 4.2–5.8)
RBC # FLD: 14.9 % — HIGH (ref 10.3–14.5)
SODIUM SERPL-SCNC: 140 MMOL/L — SIGNIFICANT CHANGE UP (ref 135–145)
SPECIMEN SOURCE: SIGNIFICANT CHANGE UP
SPECIMEN SOURCE: SIGNIFICANT CHANGE UP
VARIANT LYMPHS # BLD: 3.5 % — SIGNIFICANT CHANGE UP
WBC # BLD: 5.13 K/UL — SIGNIFICANT CHANGE UP (ref 3.8–10.5)
WBC # FLD AUTO: 5.13 K/UL — SIGNIFICANT CHANGE UP (ref 3.8–10.5)

## 2019-08-13 PROCEDURE — 99232 SBSQ HOSP IP/OBS MODERATE 35: CPT

## 2019-08-13 PROCEDURE — 99233 SBSQ HOSP IP/OBS HIGH 50: CPT

## 2019-08-13 RX ADMIN — PIPERACILLIN AND TAZOBACTAM 25 GRAM(S): 4; .5 INJECTION, POWDER, LYOPHILIZED, FOR SOLUTION INTRAVENOUS at 05:41

## 2019-08-13 RX ADMIN — FAMOTIDINE 40 MILLIGRAM(S): 10 INJECTION INTRAVENOUS at 21:32

## 2019-08-13 RX ADMIN — Medication 4: at 17:56

## 2019-08-13 RX ADMIN — Medication 4 UNIT(S): at 09:02

## 2019-08-13 RX ADMIN — Medication 250 MILLIGRAM(S): at 13:24

## 2019-08-13 RX ADMIN — Medication 4 UNIT(S): at 17:57

## 2019-08-13 RX ADMIN — SIMVASTATIN 5 MILLIGRAM(S): 20 TABLET, FILM COATED ORAL at 21:32

## 2019-08-13 RX ADMIN — Medication 3: at 13:37

## 2019-08-13 RX ADMIN — Medication 4 UNIT(S): at 13:38

## 2019-08-13 RX ADMIN — CHLORHEXIDINE GLUCONATE 1 APPLICATION(S): 213 SOLUTION TOPICAL at 13:24

## 2019-08-13 RX ADMIN — PIPERACILLIN AND TAZOBACTAM 25 GRAM(S): 4; .5 INJECTION, POWDER, LYOPHILIZED, FOR SOLUTION INTRAVENOUS at 21:25

## 2019-08-13 RX ADMIN — TAMSULOSIN HYDROCHLORIDE 0.4 MILLIGRAM(S): 0.4 CAPSULE ORAL at 21:34

## 2019-08-13 RX ADMIN — INSULIN GLARGINE 30 UNIT(S): 100 INJECTION, SOLUTION SUBCUTANEOUS at 21:28

## 2019-08-13 RX ADMIN — PIPERACILLIN AND TAZOBACTAM 25 GRAM(S): 4; .5 INJECTION, POWDER, LYOPHILIZED, FOR SOLUTION INTRAVENOUS at 14:53

## 2019-08-13 RX ADMIN — Medication 250 MILLIGRAM(S): at 01:38

## 2019-08-13 NOTE — PROGRESS NOTE ADULT - SUBJECTIVE AND OBJECTIVE BOX
CC: F/U for ? OM    Saw/spoke to patient. Patient unchanged. No new complaints.    Allergies  No Known Allergies    ANTIMICROBIALS:  piperacillin/tazobactam IVPB.. 3.375 every 8 hours  vancomycin  IVPB 1000 every 12 hours    PE:    Vital Signs Last 24 Hrs  T(C): 36.9 (13 Aug 2019 05:39), Max: 37 (12 Aug 2019 12:29)  T(F): 98.5 (13 Aug 2019 05:39), Max: 98.6 (12 Aug 2019 12:29)  HR: 69 (13 Aug 2019 05:39) (69 - 76)  BP: 147/68 (13 Aug 2019 05:39) (147/68 - 148/51)  RR: 18 (13 Aug 2019 05:39) (17 - 18)  SpO2: 98% (13 Aug 2019 05:39) (94% - 98%)    Gen: AOx3, NAD, non-toxic, pleasant  CV: S1+S2 normal, nontachycardic  Resp: Clear bilat, no resp distress, no crackles/wheezes  Abd: Soft, nontender, +BS  Ext: LLE bandaged TMA site; RLE bandaged 1st toe    LABS:                        9.1    5.13  )-----------( 335      ( 13 Aug 2019 06:48 )             28.3     08-13    140  |  102  |  9   ----------------------------<  122<H>  3.7   |  27  |  1.25    Ca    8.7      13 Aug 2019 06:48  Phos  3.0     08-13  Mg     2.1     08-13    MICROBIOLOGY:    BONE  08-08-19 --  --  Staph. aureus *MRSA*  Staphylococcus epidermidis    FOOT  08-08-19 --  --  Staph. aureus *MRSA*  Staphylococcus sp.,coag neg    (otherwise reviewed)    RADIOLOGY:    8/8 XR:    IMPRESSION:  Status post left foot TMA stump revision with postsurgical changes in the   overlying soft tissues. Concomitant tendo Achilles lengthening procedure   also performed. No focal areas of osteomyelitis. Normal configuration and   alignment of the left midfoot and hindfoot osseous structures and   articulations with preserved joint spaces. Splint material supports the   plantar posterior surfaces of the extremity.    Redemonstrated slightly distracted avulsion fracture of right hallux   distal phalangeal tuft with surrounding soft tissue swelling. In   addition, split appearing hallux nail shadow. Remainder of right foot   stable and unremarkable.

## 2019-08-13 NOTE — PROGRESS NOTE ADULT - SUBJECTIVE AND OBJECTIVE BOX
Podiatry pager #: [facility] [phone]/ [phone]    Patient is a 63y old  Male who presents with a chief complaint of OM (11 Aug 2019 14:05)       INTERVAL HPI/OVERNIGHT EVENTS:  Patient seen and evaluated at bedside.  Pt is resting comfortable in NAD. Denies N/V/F/C.      Allergies    No Known Allergies    Intolerances        Vital Signs Last 24 Hrs  T(C): 36.9 (13 Aug 2019 05:39), Max: 37 (12 Aug 2019 12:29)  T(F): 98.5 (13 Aug 2019 05:39), Max: 98.6 (12 Aug 2019 12:29)  HR: 69 (13 Aug 2019 05:39) (69 - 76)  BP: 147/68 (13 Aug 2019 05:39) (147/68 - 148/51)  BP(mean): --  RR: 18 (13 Aug 2019 05:39) (17 - 18)  SpO2: 98% (13 Aug 2019 05:39) (94% - 98%)    LABS:                        9.1    5.13  )-----------( 335      ( 13 Aug 2019 06:48 )             28.3     08-13    140  |  102  |  9   ----------------------------<  122<H>  3.7   |  27  |  1.25    Ca    8.7      13 Aug 2019 06:48  Phos  3.0     08-13  Mg     2.1     08-13          CAPILLARY BLOOD GLUCOSE      POCT Blood Glucose.: 128 mg/dL (13 Aug 2019 08:08)  POCT Blood Glucose.: 143 mg/dL (12 Aug 2019 20:45)  POCT Blood Glucose.: 170 mg/dL (12 Aug 2019 16:57)  POCT Blood Glucose.: 130 mg/dL (12 Aug 2019 12:24)      Lower Extremity Physical Exam:  s/p LF revisional TMA with percutaneous LOLA and Right Hallux bone biopsy on 8/8.   Right Hallux biopsy site stable w/ intact sutures, no dehiscence, well copated, no drainage, no acute signs of infection.  Left LOLA site with sutures intact without any drainage or signs of acute infection. Left revisional TMA site with sutures intact, no suture tension, no hematoma, no acute signs of infection. No duskiness of the dorsoplantar flap observed. Central surgical site small opening probes to bone, no pus, mild sanguinous drainage.     RADIOLOGY & ADDITIONAL TESTS:

## 2019-08-13 NOTE — PROGRESS NOTE ADULT - PROBLEM SELECTOR PLAN 5
Anemia and thrombocytopenia likely myelosuppression from Gemzar, recently given on last week   - Hgb 9.1,   today

## 2019-08-13 NOTE — PROGRESS NOTE ADULT - SUBJECTIVE AND OBJECTIVE BOX
Patient is a 63y old  Male who presents with a chief complaint of OM (11 Aug 2019 14:05)      SUBJECTIVE / OVERNIGHT EVENTS: No complaints today    MEDICATIONS  (STANDING):  chlorhexidine 4% Liquid 1 Application(s) Topical daily  dextrose 5%. 1000 milliLiter(s) (50 mL/Hr) IV Continuous <Continuous>  dextrose 50% Injectable 12.5 Gram(s) IV Push once  dextrose 50% Injectable 25 Gram(s) IV Push once  dextrose 50% Injectable 25 Gram(s) IV Push once  docusate sodium 100 milliGRAM(s) Oral three times a day  famotidine    Tablet 40 milliGRAM(s) Oral at bedtime  insulin glargine Injectable (LANTUS) 30 Unit(s) SubCutaneous at bedtime  insulin lispro (HumaLOG) corrective regimen sliding scale   SubCutaneous three times a day before meals  insulin lispro Injectable (HumaLOG) 4 Unit(s) SubCutaneous three times a day before meals  piperacillin/tazobactam IVPB.. 3.375 Gram(s) IV Intermittent every 8 hours  polyethylene glycol 3350 17 Gram(s) Oral daily  senna 2 Tablet(s) Oral at bedtime  simvastatin 5 milliGRAM(s) Oral at bedtime  tamsulosin 0.4 milliGRAM(s) Oral at bedtime  vancomycin  IVPB 1000 milliGRAM(s) IV Intermittent every 12 hours    MEDICATIONS  (PRN):  acetaminophen   Tablet .. 650 milliGRAM(s) Oral every 6 hours PRN Mild Pain (1 - 3)  dextrose 40% Gel 15 Gram(s) Oral once PRN Blood Glucose LESS THAN 70 milliGRAM(s)/deciliter  glucagon  Injectable 1 milliGRAM(s) IntraMuscular once PRN Glucose LESS THAN 70 milligrams/deciliter  HYDROmorphone  Injectable 0.5 milliGRAM(s) IV Push every 4 hours PRN Severe Pain (7 - 10)  oxyCODONE    5 mG/acetaminophen 325 mG 1 Tablet(s) Oral every 4 hours PRN Moderate Pain (4 - 6)      Vital Signs Last 24 Hrs  T(C): 37.1 (13 Aug 2019 12:23), Max: 37.1 (13 Aug 2019 12:23)  T(F): 98.8 (13 Aug 2019 12:23), Max: 98.8 (13 Aug 2019 12:23)  HR: 71 (13 Aug 2019 12:23) (69 - 76)  BP: 159/76 (13 Aug 2019 12:23) (147/68 - 159/76)  BP(mean): --  RR: 18 (13 Aug 2019 12:23) (18 - 18)  SpO2: 96% (13 Aug 2019 12:23) (94% - 98%)  CAPILLARY BLOOD GLUCOSE      POCT Blood Glucose.: 252 mg/dL (13 Aug 2019 13:35)  POCT Blood Glucose.: 190 mg/dL (13 Aug 2019 12:09)  POCT Blood Glucose.: 128 mg/dL (13 Aug 2019 08:08)  POCT Blood Glucose.: 143 mg/dL (12 Aug 2019 20:45)  POCT Blood Glucose.: 170 mg/dL (12 Aug 2019 16:57)    I&O's Summary    12 Aug 2019 07:01  -  13 Aug 2019 07:00  --------------------------------------------------------  IN: 0 mL / OUT: 1845 mL / NET: -1845 mL        PHYSICAL EXAM:  GENERAL: NAD, well-developed  HEAD:  Atraumatic, Normocephalic  EYES: EOMI, PERRLA, conjunctiva and sclera clear  NECK: Supple, No JVD  CHEST/LUNG: Clear to auscultation bilaterally; No wheeze  HEART: Regular rate and rhythm; No murmurs, rubs, or gallops  ABDOMEN: Soft, Nontender, Nondistended; Bowel sounds present  : (+) dutton   EXTREMITIES:  (+) dressing at b/l feet  PSYCH: AAOx3  NEUROLOGY: non-focal  SKIN: No rashes or lesions    LABS:                        9.1    5.13  )-----------( 335      ( 13 Aug 2019 06:48 )             28.3     08-13    140  |  102  |  9   ----------------------------<  122<H>  3.7   |  27  |  1.25    Ca    8.7      13 Aug 2019 06:48  Phos  3.0     08-13  Mg     2.1     08-13                RADIOLOGY & ADDITIONAL TESTS:    Imaging Personally Reviewed:    Consultant(s) Notes Reviewed:  ID    Care Discussed with Consultants/Other Providers:

## 2019-08-13 NOTE — PROGRESS NOTE ADULT - ASSESSMENT
Pt s/p LF revisional TMA with percutaneous LOLA and right foot hallux bone biopsy on 8/8  - Pt seen and evaluated  - Surgical sites stable for right hallux bone biopsy site with sutures intact, no acute SOI, left foot percutaneous LOLA sites with sutures intact, no acute SOI and left revisional TMA site closed with sutures intact, no acute SOI with small central opening to bone, noted mild serosanguinous drainage   - Surgical sites assessed and dressed with DSD and posterior splint reapplied to the LLE. Central small opening is packed.   - Clean bone margin and bone biopsy are growing MRSA  - awaits final pathology   - will follow up with ID recs   - Discussed with attending

## 2019-08-13 NOTE — PROGRESS NOTE ADULT - ASSESSMENT
62 yo M with PMH extensive small cell lung cancer, metastatic to bone, and brain on Gemcitabine (last dose Mon 7/29/19), HTN, HLD, DM, CKD. P/w fever, chills, urinary incontinence and cough with white sputum.  Fever, leukocytosis  R foot toe wound; L foot TMA wound  Per note, patient has purulence expressed from Hallux wound prior  Both wounds appear chronic/longstanding, with minimal erythema or discharge  BCX NGTD, UCX neg  S/p LLE TMA revision; R bone biopsy 8/8--cultures from procedure growing MRSA, concerning  Overall, fever, wound infection, elevated ESR, OM  - Zosyn 3.375g q 8  - Vanco 1g q 12 (monitor troughs--prior high)  - F/U podiatry  - F/U OR cultures/path  - Cultures concerning for MRSA on clean bone margin culture, may have to treat with OM duration abx? F/U pending path    Elmer Child MD  Pager [phone]  After 5pm and on weekends call [phone]

## 2019-08-14 LAB
ANION GAP SERPL CALC-SCNC: 12 MMO/L — SIGNIFICANT CHANGE UP (ref 7–14)
BUN SERPL-MCNC: 10 MG/DL — SIGNIFICANT CHANGE UP (ref 7–23)
CALCIUM SERPL-MCNC: 8.5 MG/DL — SIGNIFICANT CHANGE UP (ref 8.4–10.5)
CHLORIDE SERPL-SCNC: 103 MMOL/L — SIGNIFICANT CHANGE UP (ref 98–107)
CO2 SERPL-SCNC: 27 MMOL/L — SIGNIFICANT CHANGE UP (ref 22–31)
CREAT SERPL-MCNC: 1.42 MG/DL — HIGH (ref 0.5–1.3)
GLUCOSE BLDC GLUCOMTR-MCNC: 150 MG/DL — HIGH (ref 70–99)
GLUCOSE BLDC GLUCOMTR-MCNC: 218 MG/DL — HIGH (ref 70–99)
GLUCOSE BLDC GLUCOMTR-MCNC: 262 MG/DL — HIGH (ref 70–99)
GLUCOSE BLDC GLUCOMTR-MCNC: 299 MG/DL — HIGH (ref 70–99)
GLUCOSE SERPL-MCNC: 146 MG/DL — HIGH (ref 70–99)
HCT VFR BLD CALC: 26.8 % — LOW (ref 39–50)
HGB BLD-MCNC: 8.6 G/DL — LOW (ref 13–17)
MAGNESIUM SERPL-MCNC: 2.1 MG/DL — SIGNIFICANT CHANGE UP (ref 1.6–2.6)
MCHC RBC-ENTMCNC: 30.8 PG — SIGNIFICANT CHANGE UP (ref 27–34)
MCHC RBC-ENTMCNC: 32.1 % — SIGNIFICANT CHANGE UP (ref 32–36)
MCV RBC AUTO: 96.1 FL — SIGNIFICANT CHANGE UP (ref 80–100)
NRBC # FLD: 0 K/UL — SIGNIFICANT CHANGE UP (ref 0–0)
PHOSPHATE SERPL-MCNC: 3.1 MG/DL — SIGNIFICANT CHANGE UP (ref 2.5–4.5)
PLATELET # BLD AUTO: 349 K/UL — SIGNIFICANT CHANGE UP (ref 150–400)
PMV BLD: 9.4 FL — SIGNIFICANT CHANGE UP (ref 7–13)
POTASSIUM SERPL-MCNC: 3.7 MMOL/L — SIGNIFICANT CHANGE UP (ref 3.5–5.3)
POTASSIUM SERPL-SCNC: 3.7 MMOL/L — SIGNIFICANT CHANGE UP (ref 3.5–5.3)
RBC # BLD: 2.79 M/UL — LOW (ref 4.2–5.8)
RBC # FLD: 14.8 % — HIGH (ref 10.3–14.5)
SODIUM SERPL-SCNC: 142 MMOL/L — SIGNIFICANT CHANGE UP (ref 135–145)
VANCOMYCIN FLD-MCNC: 18.1 UG/ML — SIGNIFICANT CHANGE UP
VANCOMYCIN TROUGH SERPL-MCNC: 25.2 UG/ML — CRITICAL HIGH (ref 10–20)
WBC # BLD: 5.45 K/UL — SIGNIFICANT CHANGE UP (ref 3.8–10.5)
WBC # FLD AUTO: 5.45 K/UL — SIGNIFICANT CHANGE UP (ref 3.8–10.5)

## 2019-08-14 PROCEDURE — 99233 SBSQ HOSP IP/OBS HIGH 50: CPT

## 2019-08-14 RX ORDER — CHLORHEXIDINE GLUCONATE 213 G/1000ML
1 SOLUTION TOPICAL
Refills: 0 | Status: DISCONTINUED | OUTPATIENT
Start: 2019-08-14 | End: 2019-08-14

## 2019-08-14 RX ADMIN — SIMVASTATIN 5 MILLIGRAM(S): 20 TABLET, FILM COATED ORAL at 21:43

## 2019-08-14 RX ADMIN — FAMOTIDINE 40 MILLIGRAM(S): 10 INJECTION INTRAVENOUS at 21:42

## 2019-08-14 RX ADMIN — PIPERACILLIN AND TAZOBACTAM 25 GRAM(S): 4; .5 INJECTION, POWDER, LYOPHILIZED, FOR SOLUTION INTRAVENOUS at 15:47

## 2019-08-14 RX ADMIN — Medication 3: at 12:34

## 2019-08-14 RX ADMIN — Medication 4 UNIT(S): at 12:35

## 2019-08-14 RX ADMIN — PIPERACILLIN AND TAZOBACTAM 25 GRAM(S): 4; .5 INJECTION, POWDER, LYOPHILIZED, FOR SOLUTION INTRAVENOUS at 05:10

## 2019-08-14 RX ADMIN — INSULIN GLARGINE 30 UNIT(S): 100 INJECTION, SOLUTION SUBCUTANEOUS at 21:42

## 2019-08-14 RX ADMIN — CHLORHEXIDINE GLUCONATE 1 APPLICATION(S): 213 SOLUTION TOPICAL at 18:39

## 2019-08-14 RX ADMIN — Medication 4 UNIT(S): at 08:28

## 2019-08-14 RX ADMIN — Medication 4 UNIT(S): at 17:25

## 2019-08-14 RX ADMIN — Medication 250 MILLIGRAM(S): at 15:43

## 2019-08-14 RX ADMIN — PIPERACILLIN AND TAZOBACTAM 25 GRAM(S): 4; .5 INJECTION, POWDER, LYOPHILIZED, FOR SOLUTION INTRAVENOUS at 21:41

## 2019-08-14 RX ADMIN — Medication 3: at 17:24

## 2019-08-14 RX ADMIN — TAMSULOSIN HYDROCHLORIDE 0.4 MILLIGRAM(S): 0.4 CAPSULE ORAL at 21:43

## 2019-08-14 NOTE — PROGRESS NOTE ADULT - ASSESSMENT
Pt s/p LF revisional TMA with percutaneous LOLA and right foot hallux bone biopsy on 8/8  - Pt seen and evaluated  - Surgical sites stable for right hallux bone biopsy site with sutures intact, no acute SOI, left foot percutaneous LOLA sites with sutures intact, no acute SOI and left revisional TMA site closed with sutures intact, no acute SOI with small central opening to bone, noted mild serosanguinous drainage   - Surgical sites assessed and dressed with DSD and posterior splint reapplied to the LLE. Central small opening is packed.   - Clean bone margin and bone biopsy are growing MRSA  - Podiatry not planning to do further surgical work-rec PICC w/ ID recs of ABX   - will follow up with ID recs   - Seen with attending Statement Selected

## 2019-08-14 NOTE — PROGRESS NOTE ADULT - SUBJECTIVE AND OBJECTIVE BOX
Patient is a 63y old  Male who presents with a chief complaint of OM (11 Aug 2019 14:05)      SUBJECTIVE / OVERNIGHT EVENTS: No complaints.     MEDICATIONS  (STANDING):  chlorhexidine 4% Liquid 1 Application(s) Topical daily  dextrose 5%. 1000 milliLiter(s) (50 mL/Hr) IV Continuous <Continuous>  dextrose 50% Injectable 12.5 Gram(s) IV Push once  dextrose 50% Injectable 25 Gram(s) IV Push once  dextrose 50% Injectable 25 Gram(s) IV Push once  docusate sodium 100 milliGRAM(s) Oral three times a day  famotidine    Tablet 40 milliGRAM(s) Oral at bedtime  insulin glargine Injectable (LANTUS) 30 Unit(s) SubCutaneous at bedtime  insulin lispro (HumaLOG) corrective regimen sliding scale   SubCutaneous three times a day before meals  insulin lispro Injectable (HumaLOG) 4 Unit(s) SubCutaneous three times a day before meals  piperacillin/tazobactam IVPB.. 3.375 Gram(s) IV Intermittent every 8 hours  polyethylene glycol 3350 17 Gram(s) Oral daily  senna 2 Tablet(s) Oral at bedtime  simvastatin 5 milliGRAM(s) Oral at bedtime  tamsulosin 0.4 milliGRAM(s) Oral at bedtime  vancomycin  IVPB 1000 milliGRAM(s) IV Intermittent every 12 hours    MEDICATIONS  (PRN):  acetaminophen   Tablet .. 650 milliGRAM(s) Oral every 6 hours PRN Mild Pain (1 - 3)  dextrose 40% Gel 15 Gram(s) Oral once PRN Blood Glucose LESS THAN 70 milliGRAM(s)/deciliter  glucagon  Injectable 1 milliGRAM(s) IntraMuscular once PRN Glucose LESS THAN 70 milligrams/deciliter  HYDROmorphone  Injectable 0.5 milliGRAM(s) IV Push every 4 hours PRN Severe Pain (7 - 10)  oxyCODONE    5 mG/acetaminophen 325 mG 1 Tablet(s) Oral every 4 hours PRN Moderate Pain (4 - 6)      Vital Signs Last 24 Hrs  T(C): 36.7 (14 Aug 2019 13:47), Max: 36.9 (14 Aug 2019 05:09)  T(F): 98.1 (14 Aug 2019 13:47), Max: 98.5 (14 Aug 2019 05:09)  HR: 81 (14 Aug 2019 13:47) (71 - 81)  BP: 149/73 (14 Aug 2019 13:47) (127/70 - 149/73)  BP(mean): --  RR: 18 (14 Aug 2019 13:47) (18 - 18)  SpO2: 97% (14 Aug 2019 13:47) (96% - 97%)  CAPILLARY BLOOD GLUCOSE      POCT Blood Glucose.: 262 mg/dL (14 Aug 2019 11:51)  POCT Blood Glucose.: 150 mg/dL (14 Aug 2019 08:05)  POCT Blood Glucose.: 123 mg/dL (13 Aug 2019 21:03)  POCT Blood Glucose.: 310 mg/dL (13 Aug 2019 17:13)    I&O's Summary    13 Aug 2019 07:01  -  14 Aug 2019 07:00  --------------------------------------------------------  IN: 0 mL / OUT: 1425 mL / NET: -1425 mL        PHYSICAL EXAM:  GENERAL: NAD, well-developed  HEAD:  Atraumatic, Normocephalic  EYES: EOMI, PERRLA, conjunctiva and sclera clear  NECK: Supple, No JVD  CHEST/LUNG: Clear to auscultation bilaterally; No wheeze  HEART: Regular rate and rhythm; No murmurs, rubs, or gallops  ABDOMEN: Soft, Nontender, Nondistended; Bowel sounds present  EXTREMITIES:  (+) dressing b/l feet  PSYCH: AAOx3  NEUROLOGY: non-focal  SKIN: No rashes or lesions    LABS:                        8.6    5.45  )-----------( 349      ( 14 Aug 2019 08:07 )             26.8     08-14    142  |  103  |  10  ----------------------------<  146<H>  3.7   |  27  |  1.42<H>    Ca    8.5      14 Aug 2019 08:07  Phos  3.1     08-14  Mg     2.1     08-14                RADIOLOGY & ADDITIONAL TESTS:    Imaging Personally Reviewed:    Consultant(s) Notes Reviewed: ID, Podiatry     Care Discussed with Consultants/Other Providers:

## 2019-08-14 NOTE — PROGRESS NOTE ADULT - SUBJECTIVE AND OBJECTIVE BOX
Podiatry pager #: [phone] ([place])/ [phone] ([facility])    Patient is a 63y old  Male who presents with a chief complaint of OM (11 Aug 2019 14:05)       INTERVAL HPI/OVERNIGHT EVENTS:  Patient seen and evaluated at bedside.  Pt is resting comfortable in NAD. Denies N/V/F/C.     Allergies    No Known Allergies    Intolerances        Vital Signs Last 24 Hrs  T(C): 36.9 (14 Aug 2019 05:09), Max: 36.9 (14 Aug 2019 05:09)  T(F): 98.5 (14 Aug 2019 05:09), Max: 98.5 (14 Aug 2019 05:09)  HR: 73 (14 Aug 2019 05:09) (71 - 73)  BP: 127/70 (14 Aug 2019 05:09) (127/70 - 145/60)  BP(mean): --  RR: 18 (14 Aug 2019 05:09) (18 - 18)  SpO2: 97% (14 Aug 2019 05:09) (96% - 97%)    LABS:                        8.6    5.45  )-----------( 349      ( 14 Aug 2019 08:07 )             26.8     08-14    142  |  103  |  10  ----------------------------<  146<H>  3.7   |  27  |  1.42<H>    Ca    8.5      14 Aug 2019 08:07  Phos  3.1     08-14  Mg     2.1     08-14          CAPILLARY BLOOD GLUCOSE      POCT Blood Glucose.: 262 mg/dL (14 Aug 2019 11:51)  POCT Blood Glucose.: 150 mg/dL (14 Aug 2019 08:05)  POCT Blood Glucose.: 123 mg/dL (13 Aug 2019 21:03)  POCT Blood Glucose.: 310 mg/dL (13 Aug 2019 17:13)  POCT Blood Glucose.: 252 mg/dL (13 Aug 2019 13:35)      Lower Extremity Physical Exam:  s/p LF revisional TMA with percutaneous LOLA and Right Hallux bone biopsy on 8/8.   Right Hallux biopsy site stable w/ intact sutures, no dehiscence, well copated, no drainage, no acute signs of infection.  Left LOLA site with sutures intact without any drainage or signs of acute infection. Left revisional TMA site with sutures intact, no suture tension, no hematoma, no acute signs of infection. No duskiness of the dorsoplantar flap observed. Central surgical site small opening probes to bone, no pus, mild sanguinous drainage.   RADIOLOGY & ADDITIONAL TESTS:

## 2019-08-14 NOTE — PROGRESS NOTE ADULT - ASSESSMENT
64 yo M with PMH extensive small cell lung cancer, metastatic to bone, and brain on Gemcitabine (last dose Mon 7/29/19), HTN, HLD, DM, CKD. P/w fever, chills, urinary incontinence and cough with white sputum.  Fever, leukocytosis  R foot toe wound; L foot TMA wound  Per note, patient has purulence expressed from Hallux wound prior  Both wounds appear chronic/longstanding, with minimal erythema or discharge  BCX NGTD, UCX neg  S/p LLE TMA revision; R bone biopsy 8/8--cultures from procedure growing MRSA, concerning for residual  Overall, fever, wound infection, elevated ESR, OM  - Zosyn 3.375g q 8  - Vanco 1g q 12 (monitor troughs)  - Discussed case with podiatry--present plan to treat with 6 weeks of Vanco/Erta IV (unless Heme/Onc believes this would significantly hinder chemotherapy treatment process)  - F/U OR cultures/path  - If confirmed no further OR plan, okay to place PICC line and prepare for outpatient prolonged abx therapy (Vanco 1g q 12 and Erta 1g q 24)    Elmer Child MD  Pager [phone]  After 5pm and on weekends call [phone]

## 2019-08-14 NOTE — PROGRESS NOTE ADULT - PROBLEM SELECTOR PLAN 5
Anemia and thrombocytopenia likely myelosuppression from Gemzar, recently given on last week   - Hgb 8.6,   today  -Cont. to monitor CBC

## 2019-08-14 NOTE — PROGRESS NOTE ADULT - SUBJECTIVE AND OBJECTIVE BOX
CC: F/U for OM    Saw/spoke to patient. No fevers, no chills. No new complaints. Unchanged.    Allergies  No Known Allergies    ANTIMICROBIALS:  piperacillin/tazobactam IVPB.. 3.375 every 8 hours  vancomycin  IVPB 1000 every 12 hours    PE:    Vital Signs Last 24 Hrs  T(C): 36.7 (14 Aug 2019 13:47), Max: 36.9 (14 Aug 2019 05:09)  T(F): 98.1 (14 Aug 2019 13:47), Max: 98.5 (14 Aug 2019 05:09)  HR: 81 (14 Aug 2019 13:47) (71 - 81)  BP: 149/73 (14 Aug 2019 13:47) (127/70 - 149/73)  RR: 18 (14 Aug 2019 13:47) (18 - 18)  SpO2: 97% (14 Aug 2019 13:47) (96% - 97%)    Gen: AOx3, NAD, non-toxic, pleasant  CV: S1+S2 normal, nontachycardic  Resp: Clear bilat, no resp distress, no crackles/wheezes  Abd: Soft, nontender, +BS  Ext: LLE TMA; RLE 1st toe wound    LABS:                        8.6    5.45  )-----------( 349      ( 14 Aug 2019 08:07 )             26.8     08-14    142  |  103  |  10  ----------------------------<  146<H>  3.7   |  27  |  1.42<H>    Ca    8.5      14 Aug 2019 08:07  Phos  3.1     08-14  Mg     2.1     08-14    MICROBIOLOGY:  Vancomycin Level, Random: 18.1 ug/mL (08-14-19 @ 12:40)  Vancomycin Level, Trough: 25.2 ug/mL (08-14-19 @ 00:44)    BONE  08-08-19 --  --  Staph. aureus *MRSA*  Staphylococcus epidermidis    FOOT  08-08-19 --  --  Staph. aureus *MRSA*  Staphylococcus sp.,coag neg    (otherwise reviewed)    RADIOLOGY:    8/8 XR:    IMPRESSION:  Status post left foot TMA stump revision with postsurgical changes in the   overlying soft tissues. Concomitant tendo Achilles lengthening procedure   also performed. No focal areas of osteomyelitis. Normal configuration and   alignment of the left midfoot and hindfoot osseous structures and   articulations with preserved joint spaces. Splint material supports the   plantar posterior surfaces of the extremity.    Redemonstrated slightly distracted avulsion fracture of right hallux   distal phalangeal tuft with surrounding soft tissue swelling. In   addition, split appearing hallux nail shadow. Remainder of right foot   stable and unremarkable.

## 2019-08-15 LAB
ANION GAP SERPL CALC-SCNC: 11 MMO/L — SIGNIFICANT CHANGE UP (ref 7–14)
BUN SERPL-MCNC: 11 MG/DL — SIGNIFICANT CHANGE UP (ref 7–23)
CALCIUM SERPL-MCNC: 8.6 MG/DL — SIGNIFICANT CHANGE UP (ref 8.4–10.5)
CHLORIDE SERPL-SCNC: 103 MMOL/L — SIGNIFICANT CHANGE UP (ref 98–107)
CO2 SERPL-SCNC: 26 MMOL/L — SIGNIFICANT CHANGE UP (ref 22–31)
CREAT SERPL-MCNC: 1.27 MG/DL — SIGNIFICANT CHANGE UP (ref 0.5–1.3)
GLUCOSE BLDC GLUCOMTR-MCNC: 139 MG/DL — HIGH (ref 70–99)
GLUCOSE BLDC GLUCOMTR-MCNC: 148 MG/DL — HIGH (ref 70–99)
GLUCOSE BLDC GLUCOMTR-MCNC: 232 MG/DL — HIGH (ref 70–99)
GLUCOSE BLDC GLUCOMTR-MCNC: 278 MG/DL — HIGH (ref 70–99)
GLUCOSE BLDC GLUCOMTR-MCNC: 290 MG/DL — HIGH (ref 70–99)
GLUCOSE SERPL-MCNC: 210 MG/DL — HIGH (ref 70–99)
HCT VFR BLD CALC: 26 % — LOW (ref 39–50)
HGB BLD-MCNC: 8.3 G/DL — LOW (ref 13–17)
MAGNESIUM SERPL-MCNC: 2.2 MG/DL — SIGNIFICANT CHANGE UP (ref 1.6–2.6)
MCHC RBC-ENTMCNC: 30.6 PG — SIGNIFICANT CHANGE UP (ref 27–34)
MCHC RBC-ENTMCNC: 31.9 % — LOW (ref 32–36)
MCV RBC AUTO: 95.9 FL — SIGNIFICANT CHANGE UP (ref 80–100)
NRBC # FLD: 0.03 K/UL — SIGNIFICANT CHANGE UP (ref 0–0)
PHOSPHATE SERPL-MCNC: 3 MG/DL — SIGNIFICANT CHANGE UP (ref 2.5–4.5)
PLATELET # BLD AUTO: 366 K/UL — SIGNIFICANT CHANGE UP (ref 150–400)
PMV BLD: 9.2 FL — SIGNIFICANT CHANGE UP (ref 7–13)
POTASSIUM SERPL-MCNC: 3.9 MMOL/L — SIGNIFICANT CHANGE UP (ref 3.5–5.3)
POTASSIUM SERPL-SCNC: 3.9 MMOL/L — SIGNIFICANT CHANGE UP (ref 3.5–5.3)
RBC # BLD: 2.71 M/UL — LOW (ref 4.2–5.8)
RBC # FLD: 14.7 % — HIGH (ref 10.3–14.5)
SODIUM SERPL-SCNC: 140 MMOL/L — SIGNIFICANT CHANGE UP (ref 135–145)
VANCOMYCIN FLD-MCNC: 16.4 UG/ML — SIGNIFICANT CHANGE UP
VANCOMYCIN TROUGH SERPL-MCNC: 23.4 UG/ML — HIGH (ref 10–20)
WBC # BLD: 4.92 K/UL — SIGNIFICANT CHANGE UP (ref 3.8–10.5)
WBC # FLD AUTO: 4.92 K/UL — SIGNIFICANT CHANGE UP (ref 3.8–10.5)

## 2019-08-15 PROCEDURE — 99232 SBSQ HOSP IP/OBS MODERATE 35: CPT

## 2019-08-15 PROCEDURE — 36573 INSJ PICC RS&I 5 YR+: CPT

## 2019-08-15 PROCEDURE — 99233 SBSQ HOSP IP/OBS HIGH 50: CPT

## 2019-08-15 RX ORDER — INSULIN LISPRO 100/ML
VIAL (ML) SUBCUTANEOUS AT BEDTIME
Refills: 0 | Status: DISCONTINUED | OUTPATIENT
Start: 2019-08-15 | End: 2019-08-19

## 2019-08-15 RX ADMIN — SIMVASTATIN 5 MILLIGRAM(S): 20 TABLET, FILM COATED ORAL at 21:53

## 2019-08-15 RX ADMIN — FAMOTIDINE 40 MILLIGRAM(S): 10 INJECTION INTRAVENOUS at 21:53

## 2019-08-15 RX ADMIN — Medication 4 UNIT(S): at 08:20

## 2019-08-15 RX ADMIN — CHLORHEXIDINE GLUCONATE 1 APPLICATION(S): 213 SOLUTION TOPICAL at 12:32

## 2019-08-15 RX ADMIN — Medication 1: at 22:24

## 2019-08-15 RX ADMIN — INSULIN GLARGINE 30 UNIT(S): 100 INJECTION, SOLUTION SUBCUTANEOUS at 22:23

## 2019-08-15 RX ADMIN — PIPERACILLIN AND TAZOBACTAM 25 GRAM(S): 4; .5 INJECTION, POWDER, LYOPHILIZED, FOR SOLUTION INTRAVENOUS at 12:32

## 2019-08-15 RX ADMIN — TAMSULOSIN HYDROCHLORIDE 0.4 MILLIGRAM(S): 0.4 CAPSULE ORAL at 21:54

## 2019-08-15 RX ADMIN — PIPERACILLIN AND TAZOBACTAM 25 GRAM(S): 4; .5 INJECTION, POWDER, LYOPHILIZED, FOR SOLUTION INTRAVENOUS at 21:54

## 2019-08-15 RX ADMIN — Medication 2: at 17:18

## 2019-08-15 RX ADMIN — Medication 4 UNIT(S): at 17:18

## 2019-08-15 RX ADMIN — PIPERACILLIN AND TAZOBACTAM 25 GRAM(S): 4; .5 INJECTION, POWDER, LYOPHILIZED, FOR SOLUTION INTRAVENOUS at 05:08

## 2019-08-15 RX ADMIN — Medication 4 UNIT(S): at 12:32

## 2019-08-15 NOTE — PROGRESS NOTE ADULT - SUBJECTIVE AND OBJECTIVE BOX
Patient is a 63y old  Male who presents with a chief complaint of OM (11 Aug 2019 14:05)      SUBJECTIVE / OVERNIGHT EVENTS: No complaints.     MEDICATIONS  (STANDING):  chlorhexidine 4% Liquid 1 Application(s) Topical daily  dextrose 5%. 1000 milliLiter(s) (50 mL/Hr) IV Continuous <Continuous>  dextrose 50% Injectable 12.5 Gram(s) IV Push once  dextrose 50% Injectable 25 Gram(s) IV Push once  dextrose 50% Injectable 25 Gram(s) IV Push once  docusate sodium 100 milliGRAM(s) Oral three times a day  famotidine    Tablet 40 milliGRAM(s) Oral at bedtime  insulin glargine Injectable (LANTUS) 30 Unit(s) SubCutaneous at bedtime  insulin lispro (HumaLOG) corrective regimen sliding scale   SubCutaneous three times a day before meals  insulin lispro Injectable (HumaLOG) 4 Unit(s) SubCutaneous three times a day before meals  piperacillin/tazobactam IVPB.. 3.375 Gram(s) IV Intermittent every 8 hours  polyethylene glycol 3350 17 Gram(s) Oral daily  senna 2 Tablet(s) Oral at bedtime  simvastatin 5 milliGRAM(s) Oral at bedtime  tamsulosin 0.4 milliGRAM(s) Oral at bedtime    MEDICATIONS  (PRN):  acetaminophen   Tablet .. 650 milliGRAM(s) Oral every 6 hours PRN Mild Pain (1 - 3)  dextrose 40% Gel 15 Gram(s) Oral once PRN Blood Glucose LESS THAN 70 milliGRAM(s)/deciliter  glucagon  Injectable 1 milliGRAM(s) IntraMuscular once PRN Glucose LESS THAN 70 milligrams/deciliter  HYDROmorphone  Injectable 0.5 milliGRAM(s) IV Push every 4 hours PRN Severe Pain (7 - 10)  oxyCODONE    5 mG/acetaminophen 325 mG 1 Tablet(s) Oral every 4 hours PRN Moderate Pain (4 - 6)      Vital Signs Last 24 Hrs  T(C): 36.8 (15 Aug 2019 12:30), Max: 37 (15 Aug 2019 05:03)  T(F): 98.2 (15 Aug 2019 12:30), Max: 98.6 (15 Aug 2019 05:03)  HR: 74 (15 Aug 2019 12:30) (66 - 74)  BP: 158/77 (15 Aug 2019 12:30) (145/73 - 160/74)  BP(mean): --  RR: 17 (15 Aug 2019 12:30) (16 - 18)  SpO2: 100% (15 Aug 2019 12:30) (97% - 100%)  CAPILLARY BLOOD GLUCOSE      POCT Blood Glucose.: 148 mg/dL (15 Aug 2019 12:01)  POCT Blood Glucose.: 139 mg/dL (15 Aug 2019 07:47)  POCT Blood Glucose.: 218 mg/dL (14 Aug 2019 21:21)  POCT Blood Glucose.: 299 mg/dL (14 Aug 2019 17:09)    I&O's Summary    14 Aug 2019 07:01  -  15 Aug 2019 07:00  --------------------------------------------------------  IN: 0 mL / OUT: 1550 mL / NET: -1550 mL        PHYSICAL EXAM:  GENERAL: NAD, well-developed  HEAD:  Atraumatic, Normocephalic  EYES: EOMI, PERRLA, conjunctiva and sclera clear  NECK: Supple, No JVD  CHEST/LUNG: Clear to auscultation bilaterally; No wheeze  HEART: Regular rate and rhythm; No murmurs, rubs, or gallops  ABDOMEN: Soft, Nontender, Nondistended; Bowel sounds present  EXTREMITIES:  (+) dressing at b/l feet  PSYCH: AAOx3  NEUROLOGY: non-focal  SKIN: No rashes or lesions    LABS:                        8.3    4.92  )-----------( 366      ( 15 Aug 2019 02:36 )             26.0     08-15    140  |  103  |  11  ----------------------------<  210<H>  3.9   |  26  |  1.27    Ca    8.6      15 Aug 2019 02:36  Phos  3.0     08-15  Mg     2.2     08-15                RADIOLOGY & ADDITIONAL TESTS:    Imaging Personally Reviewed:    Consultant(s) Notes Reviewed:  ID, Podiatry    Care Discussed with Consultants/Other Providers: Dr Pat, hem/onc consult, OK to transfer to Rehab to complete 6 weeks of Vacomycin for his OM as per Onc.

## 2019-08-15 NOTE — PROGRESS NOTE ADULT - ASSESSMENT
63m with extensive stage, SCLC, s/p progression on carbo/etop and nivolumab, started on 3rd line single agent gemcitabine, s/p GK to brain lesion, presenting with fever.   Has mild leukocytosis with left shift.  Pt has a chronic diabetic foot ulcer, has had this for more than 1 year. He has OM.   Pt s/p amputation, but margin positive and would require 6 weeks of antibiotics to complete treatment course of OM    Agree with 6 weeks of antibiotics to complete treatment for OM. 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   Supportive care, pain control, Nutrition, PT, DVT ppx    Please do not hesitate to call back with questions.     Mine Manuel MD  Medical Oncology Attending  C: [phone] 63m with extensive stage, SCLC, s/p progression on carbo/etop and nivolumab, started on 3rd line single agent gemcitabine, s/p GK to brain lesion, presenting with fever.   Has mild leukocytosis with left shift.  Pt has a chronic diabetic foot ulcer, has had this for more than 1 year. He has OM.   Pt s/p amputation, but margin positive and would require 6 weeks of antibiotics to complete treatment course of OM  Has failed TOV and is to be discharged with chronic dutton and outpatient follow up    Agree with 6 weeks of antibiotics to complete treatment for OM. 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   Supportive care, pain control, Nutrition, PT, DVT ppx    Please do not hesitate to call back with questions.     Mine Manuel MD  Medical Oncology Attending  C: [phone]

## 2019-08-15 NOTE — PROGRESS NOTE ADULT - SUBJECTIVE AND OBJECTIVE BOX
CC: F/U for OM    Saw/spoke to patient. No fevers, no chills. No new complaints.    Allergies  No Known Allergies    ANTIMICROBIALS:  piperacillin/tazobactam IVPB.. 3.375 every 8 hours    PE:    Vital Signs Last 24 Hrs  T(C): 37 (15 Aug 2019 05:03), Max: 37 (15 Aug 2019 05:03)  T(F): 98.6 (15 Aug 2019 05:03), Max: 98.6 (15 Aug 2019 05:03)  HR: 69 (15 Aug 2019 05:03) (66 - 81)  BP: 145/73 (15 Aug 2019 05:03) (145/73 - 160/74)  RR: 18 (15 Aug 2019 05:03) (16 - 18)  SpO2: 100% (15 Aug 2019 05:03) (97% - 100%)    Gen: AOx3, NAD, non-toxic, pleasant  CV: S1+S2 normal, nontachycardic  Resp: Clear bilat, no resp distress, no crackles/wheezes  Abd: Soft, nontender, +BS  Ext: LLE TMA; RLE hallux wound    LABS:                        8.3    4.92  )-----------( 366      ( 15 Aug 2019 02:36 )             26.0     08-15    140  |  103  |  11  ----------------------------<  210<H>  3.9   |  26  |  1.27    Ca    8.6      15 Aug 2019 02:36  Phos  3.0     08-15  Mg     2.2     08-15    MICROBIOLOGY:  Vancomycin Level, Trough: 23.4 ug/mL (08-15-19 @ 02:36)  Vancomycin Level, Random: 18.1 ug/mL (08-14-19 @ 12:40)    BONE  08-08-19 --  --  Staph. aureus *MRSA*  Staphylococcus epidermidis    FOOT  08-08-19 --  --  Staph. aureus *MRSA*  Staphylococcus sp.,coag neg    (otherwise reviewed)    RADIOLOGY:    8/8 XR:    IMPRESSION:  Status post left foot TMA stump revision with postsurgical changes in the   overlying soft tissues. Concomitant tendo Achilles lengthening procedure   also performed. No focal areas of osteomyelitis. Normal configuration and   alignment of the left midfoot and hindfoot osseous structures and   articulations with preserved joint spaces. Splint material supports the   plantar posterior surfaces of the extremity.    Redemonstrated slightly distracted avulsion fracture of right hallux   distal phalangeal tuft with surrounding soft tissue swelling. In   addition, split appearing hallux nail shadow. Remainder of right foot   stable and unremarkable.

## 2019-08-15 NOTE — PROGRESS NOTE ADULT - SUBJECTIVE AND OBJECTIVE BOX
INTERVAL HPI/OVERNIGHT EVENTS:  Patient seen at bedside.      VITAL SIGNS:  T(F): 98.2 (08-15-19 @ 12:30)  HR: 74 (08-15-19 @ 12:30)  BP: 158/77 (08-15-19 @ 12:30)  RR: 17 (08-15-19 @ 12:30)  SpO2: 100% (08-15-19 @ 12:30)  Wt(kg): --    PHYSICAL EXAM:    Constitutional: NAD, resting in bed comfortably  Eyes: EOMI, sclera non-icteric  Neck: supple, no LAP  Respiratory: CTA b/l, good air entry b/l, no wheezing, rhonchi or crackels  Cardiovascular: RRR, normal S1S2, no M/R/G  Gastrointestinal: soft, NTND  Extremities: no edema  Neurological: AAOx3, non focal  Skin: Normal temperature    MEDICATIONS  (STANDING):  chlorhexidine 4% Liquid 1 Application(s) Topical daily  dextrose 5%. 1000 milliLiter(s) (50 mL/Hr) IV Continuous <Continuous>  dextrose 50% Injectable 12.5 Gram(s) IV Push once  dextrose 50% Injectable 25 Gram(s) IV Push once  dextrose 50% Injectable 25 Gram(s) IV Push once  docusate sodium 100 milliGRAM(s) Oral three times a day  famotidine    Tablet 40 milliGRAM(s) Oral at bedtime  insulin glargine Injectable (LANTUS) 30 Unit(s) SubCutaneous at bedtime  insulin lispro (HumaLOG) corrective regimen sliding scale   SubCutaneous three times a day before meals  insulin lispro Injectable (HumaLOG) 4 Unit(s) SubCutaneous three times a day before meals  piperacillin/tazobactam IVPB.. 3.375 Gram(s) IV Intermittent every 8 hours  polyethylene glycol 3350 17 Gram(s) Oral daily  senna 2 Tablet(s) Oral at bedtime  simvastatin 5 milliGRAM(s) Oral at bedtime  tamsulosin 0.4 milliGRAM(s) Oral at bedtime    MEDICATIONS  (PRN):  acetaminophen   Tablet .. 650 milliGRAM(s) Oral every 6 hours PRN Mild Pain (1 - 3)  dextrose 40% Gel 15 Gram(s) Oral once PRN Blood Glucose LESS THAN 70 milliGRAM(s)/deciliter  glucagon  Injectable 1 milliGRAM(s) IntraMuscular once PRN Glucose LESS THAN 70 milligrams/deciliter  HYDROmorphone  Injectable 0.5 milliGRAM(s) IV Push every 4 hours PRN Severe Pain (7 - 10)  oxyCODONE    5 mG/acetaminophen 325 mG 1 Tablet(s) Oral every 4 hours PRN Moderate Pain (4 - 6)      Allergies    No Known Allergies    Intolerances        LABS:                        8.3    4.92  )-----------( 366      ( 15 Aug 2019 02:36 )             26.0     08-15    140  |  103  |  11  ----------------------------<  210<H>  3.9   |  26  |  1.27    Ca    8.6      15 Aug 2019 02:36  Phos  3.0     08-15  Mg     2.2     08-15            RADIOLOGY & ADDITIONAL TESTS:  Studies reviewed.

## 2019-08-15 NOTE — PROGRESS NOTE ADULT - PROBLEM SELECTOR PLAN 1
MRI findings with "Soft tissue ulceration over lateral plantar aspect of the TMA stump with evidence for osteomyelitis involving the underlying 4th and 5th   metatarsal margins. The 4th and 5th metatarsal stumps are also noted to   be longer relative to the remaining metatarsal stump remnants suggesting an aggressive edge."  - S/p OR 8/8 for Left foot revisional TMA w/ PT/Achilles tenotomy and Right foot Hallux Bone Biopsy   -  OR cultures with MRSA, c/w Vanco and Zosyn as per ID  - Will f/u final OR cultures/path

## 2019-08-15 NOTE — PROGRESS NOTE ADULT - ASSESSMENT
64 yo M with PMH extensive small cell lung cancer, metastatic to bone, and brain on Gemcitabine (last dose Mon 7/29/19), HTN, HLD, DM, CKD. P/w fever, chills, urinary incontinence and cough with white sputum.  Fever, leukocytosis  R foot toe wound; L foot TMA wound  Per note, patient has purulence expressed from Hallux wound prior  Both wounds appear chronic/longstanding, with minimal erythema or discharge  BCX NGTD, UCX neg  S/p LLE TMA revision; R bone biopsy 8/8--cultures from procedure growing MRSA, concerning for residual infection  Overall, fever, wound infection, elevated ESR, OM  - Hold Vanco, resume at 750mg q 12 when trough <15 (check AM levels)  - DC Zosyn  - When DC planning, send on Vanco 750mg q 12 through 9/18/19. Patient planned for rehab. Facility MD to monitor patient on abx, recommend CBC/CMP/Vanco level weekly  - F/U pending cultures    Elmer Child MD  Pager [phone]  After 5pm and on weekends call [phone]

## 2019-08-16 DIAGNOSIS — Z71.89 OTHER SPECIFIED COUNSELING: ICD-10-CM

## 2019-08-16 LAB
ANION GAP SERPL CALC-SCNC: 13 MMO/L — SIGNIFICANT CHANGE UP (ref 7–14)
BUN SERPL-MCNC: 10 MG/DL — SIGNIFICANT CHANGE UP (ref 7–23)
CALCIUM SERPL-MCNC: 9.7 MG/DL — SIGNIFICANT CHANGE UP (ref 8.4–10.5)
CHLORIDE SERPL-SCNC: 100 MMOL/L — SIGNIFICANT CHANGE UP (ref 98–107)
CO2 SERPL-SCNC: 27 MMOL/L — SIGNIFICANT CHANGE UP (ref 22–31)
CREAT SERPL-MCNC: 1.34 MG/DL — HIGH (ref 0.5–1.3)
GLUCOSE BLDC GLUCOMTR-MCNC: 192 MG/DL — HIGH (ref 70–99)
GLUCOSE BLDC GLUCOMTR-MCNC: 211 MG/DL — HIGH (ref 70–99)
GLUCOSE BLDC GLUCOMTR-MCNC: 252 MG/DL — HIGH (ref 70–99)
GLUCOSE BLDC GLUCOMTR-MCNC: 96 MG/DL — SIGNIFICANT CHANGE UP (ref 70–99)
GLUCOSE SERPL-MCNC: 107 MG/DL — HIGH (ref 70–99)
HCT VFR BLD CALC: 31.7 % — LOW (ref 39–50)
HGB BLD-MCNC: 9.8 G/DL — LOW (ref 13–17)
MAGNESIUM SERPL-MCNC: 2.2 MG/DL — SIGNIFICANT CHANGE UP (ref 1.6–2.6)
MCHC RBC-ENTMCNC: 29.6 PG — SIGNIFICANT CHANGE UP (ref 27–34)
MCHC RBC-ENTMCNC: 30.9 % — LOW (ref 32–36)
MCV RBC AUTO: 95.8 FL — SIGNIFICANT CHANGE UP (ref 80–100)
NRBC # FLD: 0 K/UL — SIGNIFICANT CHANGE UP (ref 0–0)
PLATELET # BLD AUTO: 385 K/UL — SIGNIFICANT CHANGE UP (ref 150–400)
PMV BLD: 9.2 FL — SIGNIFICANT CHANGE UP (ref 7–13)
POTASSIUM SERPL-MCNC: 3.7 MMOL/L — SIGNIFICANT CHANGE UP (ref 3.5–5.3)
POTASSIUM SERPL-SCNC: 3.7 MMOL/L — SIGNIFICANT CHANGE UP (ref 3.5–5.3)
RBC # BLD: 3.31 M/UL — LOW (ref 4.2–5.8)
RBC # FLD: 14.8 % — HIGH (ref 10.3–14.5)
SODIUM SERPL-SCNC: 140 MMOL/L — SIGNIFICANT CHANGE UP (ref 135–145)
SURGICAL PATHOLOGY STUDY: SIGNIFICANT CHANGE UP
VANCOMYCIN FLD-MCNC: 12.6 UG/ML — SIGNIFICANT CHANGE UP
WBC # BLD: 5.39 K/UL — SIGNIFICANT CHANGE UP (ref 3.8–10.5)
WBC # FLD AUTO: 5.39 K/UL — SIGNIFICANT CHANGE UP (ref 3.8–10.5)

## 2019-08-16 PROCEDURE — 99232 SBSQ HOSP IP/OBS MODERATE 35: CPT

## 2019-08-16 PROCEDURE — 99233 SBSQ HOSP IP/OBS HIGH 50: CPT

## 2019-08-16 RX ORDER — INSULIN GLARGINE 100 [IU]/ML
0 INJECTION, SOLUTION SUBCUTANEOUS
Qty: 0 | Refills: 0 | DISCHARGE

## 2019-08-16 RX ORDER — TAMSULOSIN HYDROCHLORIDE 0.4 MG/1
1 CAPSULE ORAL
Qty: 0 | Refills: 0 | DISCHARGE
Start: 2019-08-16

## 2019-08-16 RX ORDER — VANCOMYCIN HCL 1 G
1250 VIAL (EA) INTRAVENOUS EVERY 24 HOURS
Refills: 0 | Status: DISCONTINUED | OUTPATIENT
Start: 2019-08-16 | End: 2019-08-19

## 2019-08-16 RX ORDER — VANCOMYCIN HCL 1 G
1.25 VIAL (EA) INTRAVENOUS
Qty: 0 | Refills: 0 | DISCHARGE
Start: 2019-08-16

## 2019-08-16 RX ORDER — METFORMIN HYDROCHLORIDE 850 MG/1
1 TABLET ORAL
Qty: 0 | Refills: 0 | DISCHARGE

## 2019-08-16 RX ORDER — FERROUS SULFATE 325(65) MG
1 TABLET ORAL
Qty: 0 | Refills: 0 | DISCHARGE

## 2019-08-16 RX ORDER — DOCUSATE SODIUM 100 MG
1 CAPSULE ORAL
Qty: 0 | Refills: 0 | DISCHARGE
Start: 2019-08-16

## 2019-08-16 RX ORDER — SENNA PLUS 8.6 MG/1
2 TABLET ORAL
Qty: 0 | Refills: 0 | DISCHARGE
Start: 2019-08-16

## 2019-08-16 RX ORDER — LOSARTAN/HYDROCHLOROTHIAZIDE 100MG-25MG
1 TABLET ORAL
Qty: 0 | Refills: 0 | DISCHARGE

## 2019-08-16 RX ADMIN — PIPERACILLIN AND TAZOBACTAM 25 GRAM(S): 4; .5 INJECTION, POWDER, LYOPHILIZED, FOR SOLUTION INTRAVENOUS at 06:56

## 2019-08-16 RX ADMIN — Medication 4 UNIT(S): at 17:26

## 2019-08-16 RX ADMIN — Medication 3: at 17:26

## 2019-08-16 RX ADMIN — Medication 166.67 MILLIGRAM(S): at 14:28

## 2019-08-16 RX ADMIN — FAMOTIDINE 40 MILLIGRAM(S): 10 INJECTION INTRAVENOUS at 21:19

## 2019-08-16 RX ADMIN — INSULIN GLARGINE 30 UNIT(S): 100 INJECTION, SOLUTION SUBCUTANEOUS at 21:20

## 2019-08-16 RX ADMIN — Medication 4 UNIT(S): at 08:19

## 2019-08-16 RX ADMIN — CHLORHEXIDINE GLUCONATE 1 APPLICATION(S): 213 SOLUTION TOPICAL at 12:53

## 2019-08-16 RX ADMIN — Medication 1: at 12:53

## 2019-08-16 RX ADMIN — Medication 4 UNIT(S): at 12:53

## 2019-08-16 RX ADMIN — TAMSULOSIN HYDROCHLORIDE 0.4 MILLIGRAM(S): 0.4 CAPSULE ORAL at 21:19

## 2019-08-16 RX ADMIN — SIMVASTATIN 5 MILLIGRAM(S): 20 TABLET, FILM COATED ORAL at 21:20

## 2019-08-16 NOTE — PROGRESS NOTE ADULT - PROBLEM SELECTOR PLAN 1
Infection (abdominal source?) vs inflammatory response to recent amputation procedures  -s/p 7 day course abx for possible lt lower extremity cellulitis  -blood cx ngtd  - restarted on van and ceftriaxone (started 3/25/18) for elevated wbc   - ID consulted, switched Ceftriaxone to Unasyn  (started 3/25/18)  - abdominal exam in concerning today, abdomen is distended which could be due to underlying infectious process vs opioid induced constipation. CT abdomen and Lactate ordered stat   - if not improvement, will switch from Unasyn to Zosyn    MRI findings with "Soft tissue ulceration over lateral plantar aspect of the TMA stump with evidence for osteomyelitis involving the underlying 4th and 5th   metatarsal margins. The 4th and 5th metatarsal stumps are also noted to   be longer relative to the remaining metatarsal stump remnants suggesting an aggressive edge."  - S/p OR 8/8 for Left foot revisional TMA w/ PT/Achilles tenotomy and Right foot Hallux Bone Biopsy   -  OR cultures with MRSA, c/w Vanco as per ID  - Plan for 6 weeks of IV vancomycin as per ID

## 2019-08-16 NOTE — PROGRESS NOTE ADULT - ASSESSMENT
62 yo M with PMH extensive small cell lung cancer, metastatic to bone, and brain on Gemcitabine (last dose Mon 7/29/19), HTN, HLD, DM, CKD. P/w fever, chills, urinary incontinence and cough with white sputum.  Fever, leukocytosis  R foot toe wound; L foot TMA wound  Per note, patient has purulence expressed from Hallux wound prior  Both wounds appear chronic/longstanding, with minimal erythema or discharge  BCX NGTD, UCX neg  S/p LLE TMA revision; R bone biopsy 8/8--cultures from procedure growing MRSA, concerning for residual infection  Overall, fever, wound infection, elevated ESR, OM  - Resume Vanco at 1250mg q 24  - Trend Cr to ensure not acutely rising  - When DC planning, send on Vanco 1250mg q 24 through 9/18/19. Patient planned for rehab. Facility MD to monitor patient on abx, recommend CBC/CMP/Vanco level weekly  - Discussed with medicine team  - Will sign off. Please call with further questions.     Elmer Child MD  Pager [phone]  After 5pm and on weekends call [phone]

## 2019-08-16 NOTE — PROGRESS NOTE ADULT - SUBJECTIVE AND OBJECTIVE BOX
CC: F/U for OM    Saw/spoke to patient. No fevers, no chills. No new complaints. Unchanged.    Allergies  No Known Allergies    ANTIMICROBIALS:  vancomycin  IVPB 1250 every 24 hours    PE:    Vital Signs Last 24 Hrs  T(C): 36.7 (16 Aug 2019 06:54), Max: 36.8 (15 Aug 2019 12:30)  T(F): 98 (16 Aug 2019 06:54), Max: 98.2 (15 Aug 2019 12:30)  HR: 71 (16 Aug 2019 06:54) (71 - 74)  BP: 150/80 (16 Aug 2019 06:54) (148/58 - 158/77)  RR: 17 (16 Aug 2019 06:54) (17 - 17)  SpO2: 97% (16 Aug 2019 06:54) (97% - 100%)    Gen: AOx3, NAD, non-toxic, pleasant  CV: S1+S2 normal, nontachycardic  Resp: Clear bilat, no resp distress, no crackles/wheezes  Abd: Soft, nontender, +BS  Ext: LLE TMA wound, RLE hallux wound    LABS:                        9.8    5.39  )-----------( 385      ( 16 Aug 2019 07:04 )             31.7     08-16    140  |  100  |  10  ----------------------------<  107<H>  3.7   |  27  |  1.34<H>    Ca    9.7      16 Aug 2019 07:04  Phos  3.0     08-15  Mg     2.2     08-16    MICROBIOLOGY:  Vancomycin Level, Random: 12.6 ug/mL (08-16-19 @ 07:04)  Vancomycin Level, Random: 16.4 ug/mL (08-15-19 @ 14:22)    BONE  08-08-19 --  --  Staph. aureus *MRSA*  Staphylococcus epidermidis    FOOT  08-08-19 --  --  Staph. aureus *MRSA*  Staphylococcus sp.,coag neg    (otherwise reviewed)    RADIOLOGY:    8/8 XR:    IMPRESSION:  Status post left foot TMA stump revision with postsurgical changes in the   overlying soft tissues. Concomitant tendo Achilles lengthening procedure   also performed. No focal areas of osteomyelitis. Normal configuration and   alignment of the left midfoot and hindfoot osseous structures and   articulations with preserved joint spaces. Splint material supports the   plantar posterior surfaces of the extremity.    Redemonstrated slightly distracted avulsion fracture of right hallux   distal phalangeal tuft with surrounding soft tissue swelling. In   addition, split appearing hallux nail shadow. Remainder of right foot   stable and unremarkable.

## 2019-08-16 NOTE — PROGRESS NOTE ADULT - SUBJECTIVE AND OBJECTIVE BOX
Patient is a 63y old  Male who presents with a chief complaint of sepsis (11 Aug 2019 22:28)      SUBJECTIVE / OVERNIGHT EVENTS: No complaints.     MEDICATIONS  (STANDING):  chlorhexidine 4% Liquid 1 Application(s) Topical daily  dextrose 5%. 1000 milliLiter(s) (50 mL/Hr) IV Continuous <Continuous>  dextrose 50% Injectable 12.5 Gram(s) IV Push once  dextrose 50% Injectable 25 Gram(s) IV Push once  dextrose 50% Injectable 25 Gram(s) IV Push once  docusate sodium 100 milliGRAM(s) Oral three times a day  famotidine    Tablet 40 milliGRAM(s) Oral at bedtime  insulin glargine Injectable (LANTUS) 30 Unit(s) SubCutaneous at bedtime  insulin lispro (HumaLOG) corrective regimen sliding scale   SubCutaneous three times a day before meals  insulin lispro (HumaLOG) corrective regimen sliding scale   SubCutaneous at bedtime  insulin lispro Injectable (HumaLOG) 4 Unit(s) SubCutaneous three times a day before meals  polyethylene glycol 3350 17 Gram(s) Oral daily  senna 2 Tablet(s) Oral at bedtime  simvastatin 5 milliGRAM(s) Oral at bedtime  tamsulosin 0.4 milliGRAM(s) Oral at bedtime  vancomycin  IVPB 1250 milliGRAM(s) IV Intermittent every 24 hours    MEDICATIONS  (PRN):  acetaminophen   Tablet .. 650 milliGRAM(s) Oral every 6 hours PRN Mild Pain (1 - 3)  dextrose 40% Gel 15 Gram(s) Oral once PRN Blood Glucose LESS THAN 70 milliGRAM(s)/deciliter  glucagon  Injectable 1 milliGRAM(s) IntraMuscular once PRN Glucose LESS THAN 70 milligrams/deciliter      Vital Signs Last 24 Hrs  T(C): 36.7 (16 Aug 2019 06:54), Max: 36.8 (15 Aug 2019 12:30)  T(F): 98 (16 Aug 2019 06:54), Max: 98.2 (15 Aug 2019 12:30)  HR: 71 (16 Aug 2019 06:54) (71 - 74)  BP: 150/80 (16 Aug 2019 06:54) (148/58 - 158/77)  BP(mean): --  RR: 17 (16 Aug 2019 06:54) (17 - 17)  SpO2: 97% (16 Aug 2019 06:54) (97% - 100%)  CAPILLARY BLOOD GLUCOSE      POCT Blood Glucose.: 96 mg/dL (16 Aug 2019 07:45)  POCT Blood Glucose.: 290 mg/dL (15 Aug 2019 22:23)  POCT Blood Glucose.: 278 mg/dL (15 Aug 2019 21:15)  POCT Blood Glucose.: 232 mg/dL (15 Aug 2019 17:12)  POCT Blood Glucose.: 148 mg/dL (15 Aug 2019 12:01)    I&O's Summary    15 Aug 2019 07:01  -  16 Aug 2019 07:00  --------------------------------------------------------  IN: 0 mL / OUT: 2100 mL / NET: -2100 mL        PHYSICAL EXAM:  GENERAL: NAD, well-developed  HEAD:  Atraumatic, Normocephalic  EYES: EOMI, PERRLA, conjunctiva and sclera clear  NECK: Supple, No JVD  CHEST/LUNG: Clear to auscultation bilaterally; No wheeze  HEART: Regular rate and rhythm; No murmurs, rubs, or gallops  ABDOMEN: Soft, Nontender, Nondistended; Bowel sounds present  EXTREMITIES:  (+) dressing intact b/l feet  PSYCH: AAOx3  NEUROLOGY: non-focal  SKIN: No rashes or lesions    LABS:                        9.8    5.39  )-----------( 385      ( 16 Aug 2019 07:04 )             31.7     08-16    140  |  100  |  10  ----------------------------<  107<H>  3.7   |  27  |  1.34<H>    Ca    9.7      16 Aug 2019 07:04  Phos  3.0     08-15  Mg     2.2     08-16                RADIOLOGY & ADDITIONAL TESTS:    Imaging Personally Reviewed:    Consultant(s) Notes Reviewed:  ID, Podiatry    Care Discussed with Consultants/Other Providers:

## 2019-08-17 LAB
ANION GAP SERPL CALC-SCNC: 13 MMO/L — SIGNIFICANT CHANGE UP (ref 7–14)
BUN SERPL-MCNC: 9 MG/DL — SIGNIFICANT CHANGE UP (ref 7–23)
CALCIUM SERPL-MCNC: 9.4 MG/DL — SIGNIFICANT CHANGE UP (ref 8.4–10.5)
CHLORIDE SERPL-SCNC: 102 MMOL/L — SIGNIFICANT CHANGE UP (ref 98–107)
CO2 SERPL-SCNC: 26 MMOL/L — SIGNIFICANT CHANGE UP (ref 22–31)
CREAT SERPL-MCNC: 1.28 MG/DL — SIGNIFICANT CHANGE UP (ref 0.5–1.3)
GLUCOSE BLDC GLUCOMTR-MCNC: 160 MG/DL — HIGH (ref 70–99)
GLUCOSE BLDC GLUCOMTR-MCNC: 195 MG/DL — HIGH (ref 70–99)
GLUCOSE BLDC GLUCOMTR-MCNC: 216 MG/DL — HIGH (ref 70–99)
GLUCOSE BLDC GLUCOMTR-MCNC: 79 MG/DL — SIGNIFICANT CHANGE UP (ref 70–99)
GLUCOSE SERPL-MCNC: 81 MG/DL — SIGNIFICANT CHANGE UP (ref 70–99)
HCT VFR BLD CALC: 30.7 % — LOW (ref 39–50)
HGB BLD-MCNC: 9.6 G/DL — LOW (ref 13–17)
MAGNESIUM SERPL-MCNC: 2.1 MG/DL — SIGNIFICANT CHANGE UP (ref 1.6–2.6)
MCHC RBC-ENTMCNC: 30 PG — SIGNIFICANT CHANGE UP (ref 27–34)
MCHC RBC-ENTMCNC: 31.3 % — LOW (ref 32–36)
MCV RBC AUTO: 95.9 FL — SIGNIFICANT CHANGE UP (ref 80–100)
NRBC # FLD: 0 K/UL — SIGNIFICANT CHANGE UP (ref 0–0)
PHOSPHATE SERPL-MCNC: 3.1 MG/DL — SIGNIFICANT CHANGE UP (ref 2.5–4.5)
PLATELET # BLD AUTO: 368 K/UL — SIGNIFICANT CHANGE UP (ref 150–400)
PMV BLD: 9.3 FL — SIGNIFICANT CHANGE UP (ref 7–13)
POTASSIUM SERPL-MCNC: 3.9 MMOL/L — SIGNIFICANT CHANGE UP (ref 3.5–5.3)
POTASSIUM SERPL-SCNC: 3.9 MMOL/L — SIGNIFICANT CHANGE UP (ref 3.5–5.3)
RBC # BLD: 3.2 M/UL — LOW (ref 4.2–5.8)
RBC # FLD: 14.8 % — HIGH (ref 10.3–14.5)
SODIUM SERPL-SCNC: 141 MMOL/L — SIGNIFICANT CHANGE UP (ref 135–145)
VANCOMYCIN FLD-MCNC: 14.8 UG/ML — SIGNIFICANT CHANGE UP
WBC # BLD: 5.54 K/UL — SIGNIFICANT CHANGE UP (ref 3.8–10.5)
WBC # FLD AUTO: 5.54 K/UL — SIGNIFICANT CHANGE UP (ref 3.8–10.5)

## 2019-08-17 PROCEDURE — 99233 SBSQ HOSP IP/OBS HIGH 50: CPT

## 2019-08-17 RX ORDER — CHLORHEXIDINE GLUCONATE 213 G/1000ML
1 SOLUTION TOPICAL DAILY
Refills: 0 | Status: DISCONTINUED | OUTPATIENT
Start: 2019-08-17 | End: 2019-08-19

## 2019-08-17 RX ADMIN — CHLORHEXIDINE GLUCONATE 1 APPLICATION(S): 213 SOLUTION TOPICAL at 12:11

## 2019-08-17 RX ADMIN — INSULIN GLARGINE 30 UNIT(S): 100 INJECTION, SOLUTION SUBCUTANEOUS at 22:41

## 2019-08-17 RX ADMIN — Medication 166.67 MILLIGRAM(S): at 13:16

## 2019-08-17 RX ADMIN — Medication 4 UNIT(S): at 08:24

## 2019-08-17 RX ADMIN — Medication 2: at 17:26

## 2019-08-17 RX ADMIN — TAMSULOSIN HYDROCHLORIDE 0.4 MILLIGRAM(S): 0.4 CAPSULE ORAL at 21:27

## 2019-08-17 RX ADMIN — SIMVASTATIN 5 MILLIGRAM(S): 20 TABLET, FILM COATED ORAL at 21:26

## 2019-08-17 RX ADMIN — CHLORHEXIDINE GLUCONATE 1 APPLICATION(S): 213 SOLUTION TOPICAL at 12:00

## 2019-08-17 RX ADMIN — Medication 4 UNIT(S): at 17:26

## 2019-08-17 RX ADMIN — FAMOTIDINE 40 MILLIGRAM(S): 10 INJECTION INTRAVENOUS at 21:26

## 2019-08-17 RX ADMIN — Medication 4 UNIT(S): at 12:10

## 2019-08-17 RX ADMIN — Medication 1: at 12:10

## 2019-08-17 NOTE — PROGRESS NOTE ADULT - SUBJECTIVE AND OBJECTIVE BOX
Patient is a 63y old  Male who presents with a chief complaint of Sepsis (11 Aug 2019 22:28)      SUBJECTIVE / OVERNIGHT EVENTS: No complaints.     MEDICATIONS  (STANDING):  chlorhexidine 4% Liquid 1 Application(s) Topical daily  chlorhexidine 4% Liquid 1 Application(s) Topical daily  dextrose 5%. 1000 milliLiter(s) (50 mL/Hr) IV Continuous <Continuous>  dextrose 50% Injectable 12.5 Gram(s) IV Push once  dextrose 50% Injectable 25 Gram(s) IV Push once  dextrose 50% Injectable 25 Gram(s) IV Push once  docusate sodium 100 milliGRAM(s) Oral three times a day  famotidine    Tablet 40 milliGRAM(s) Oral at bedtime  insulin glargine Injectable (LANTUS) 30 Unit(s) SubCutaneous at bedtime  insulin lispro (HumaLOG) corrective regimen sliding scale   SubCutaneous three times a day before meals  insulin lispro (HumaLOG) corrective regimen sliding scale   SubCutaneous at bedtime  insulin lispro Injectable (HumaLOG) 4 Unit(s) SubCutaneous three times a day before meals  polyethylene glycol 3350 17 Gram(s) Oral daily  senna 2 Tablet(s) Oral at bedtime  simvastatin 5 milliGRAM(s) Oral at bedtime  tamsulosin 0.4 milliGRAM(s) Oral at bedtime  vancomycin  IVPB 1250 milliGRAM(s) IV Intermittent every 24 hours    MEDICATIONS  (PRN):  acetaminophen   Tablet .. 650 milliGRAM(s) Oral every 6 hours PRN Mild Pain (1 - 3)  dextrose 40% Gel 15 Gram(s) Oral once PRN Blood Glucose LESS THAN 70 milliGRAM(s)/deciliter  glucagon  Injectable 1 milliGRAM(s) IntraMuscular once PRN Glucose LESS THAN 70 milligrams/deciliter      Vital Signs Last 24 Hrs  T(C): 37 (17 Aug 2019 06:15), Max: 37 (17 Aug 2019 06:15)  T(F): 98.6 (17 Aug 2019 06:15), Max: 98.6 (17 Aug 2019 06:15)  HR: 69 (17 Aug 2019 06:15) (68 - 74)  BP: 147/67 (17 Aug 2019 06:15) (146/70 - 148/66)  BP(mean): --  RR: 17 (17 Aug 2019 06:15) (17 - 17)  SpO2: 100% (17 Aug 2019 06:15) (98% - 100%)  CAPILLARY BLOOD GLUCOSE      POCT Blood Glucose.: 79 mg/dL (17 Aug 2019 07:45)  POCT Blood Glucose.: 211 mg/dL (16 Aug 2019 20:59)  POCT Blood Glucose.: 252 mg/dL (16 Aug 2019 16:55)  POCT Blood Glucose.: 192 mg/dL (16 Aug 2019 12:00)    I&O's Summary    16 Aug 2019 07:01  -  17 Aug 2019 07:00  --------------------------------------------------------  IN: 740 mL / OUT: 2800 mL / NET: -2060 mL        PHYSICAL EXAM:  GENERAL: NAD, well-developed  HEAD:  Atraumatic, Normocephalic  EYES: EOMI, PERRLA, conjunctiva and sclera clear  NECK: Supple, No JVD  CHEST/LUNG: Clear to auscultation bilaterally; No wheeze  HEART: Regular rate and rhythm; No murmurs, rubs, or gallops  ABDOMEN: Soft, Nontender, Nondistended; Bowel sounds present  EXTREMITIES:  (+) dressing b/l feet  PSYCH: AAOx3  NEUROLOGY: non-focal  SKIN: No rashes or lesions    LABS:                        9.6    5.54  )-----------( 368      ( 17 Aug 2019 06:26 )             30.7     08-17    141  |  102  |  9   ----------------------------<  81  3.9   |  26  |  1.28    Ca    9.4      17 Aug 2019 06:26  Phos  3.1     08-17  Mg     2.1     08-17                RADIOLOGY & ADDITIONAL TESTS:    Imaging Personally Reviewed:    Consultant(s) Notes Reviewed:  ID, Podiatry    Care Discussed with Consultants/Other Providers:

## 2019-08-17 NOTE — PROGRESS NOTE ADULT - ASSESSMENT
63M with PMHx of extensive small cell lung cancer, metastatic to bone, and brain followed by Dr. Thao who presents with fever, chills found to have OM of left foot s/p revisional TMA on 8/8. (+) MRSA from wound cx.  awaiting rehab

## 2019-08-17 NOTE — PROGRESS NOTE ADULT - PROBLEM SELECTOR PLAN 1
MRI findings with "Soft tissue ulceration over lateral plantar aspect of the TMA stump with evidence for osteomyelitis involving the underlying 4th and 5th   metatarsal margins. The 4th and 5th metatarsal stumps are also noted to   be longer relative to the remaining metatarsal stump remnants suggesting an aggressive edge."  - S/p OR 8/8 for Left foot revisional TMA w/ PT/Achilles tenotomy and Right foot Hallux Bone Biopsy   -  OR cultures with MRSA, c/w Vanco as per ID  - Plan for 6 weeks of IV vancomycin as per ID

## 2019-08-18 LAB
ANION GAP SERPL CALC-SCNC: 9 MMO/L — SIGNIFICANT CHANGE UP (ref 7–14)
BUN SERPL-MCNC: 13 MG/DL — SIGNIFICANT CHANGE UP (ref 7–23)
CALCIUM SERPL-MCNC: 9 MG/DL — SIGNIFICANT CHANGE UP (ref 8.4–10.5)
CHLORIDE SERPL-SCNC: 103 MMOL/L — SIGNIFICANT CHANGE UP (ref 98–107)
CO2 SERPL-SCNC: 28 MMOL/L — SIGNIFICANT CHANGE UP (ref 22–31)
CREAT SERPL-MCNC: 1.35 MG/DL — HIGH (ref 0.5–1.3)
GLUCOSE BLDC GLUCOMTR-MCNC: 105 MG/DL — HIGH (ref 70–99)
GLUCOSE BLDC GLUCOMTR-MCNC: 145 MG/DL — HIGH (ref 70–99)
GLUCOSE BLDC GLUCOMTR-MCNC: 175 MG/DL — HIGH (ref 70–99)
GLUCOSE BLDC GLUCOMTR-MCNC: 249 MG/DL — HIGH (ref 70–99)
GLUCOSE SERPL-MCNC: 152 MG/DL — HIGH (ref 70–99)
HCT VFR BLD CALC: 27.6 % — LOW (ref 39–50)
HGB BLD-MCNC: 8.7 G/DL — LOW (ref 13–17)
MAGNESIUM SERPL-MCNC: 2.1 MG/DL — SIGNIFICANT CHANGE UP (ref 1.6–2.6)
MCHC RBC-ENTMCNC: 30.4 PG — SIGNIFICANT CHANGE UP (ref 27–34)
MCHC RBC-ENTMCNC: 31.5 % — LOW (ref 32–36)
MCV RBC AUTO: 96.5 FL — SIGNIFICANT CHANGE UP (ref 80–100)
NRBC # FLD: 0.04 K/UL — SIGNIFICANT CHANGE UP (ref 0–0)
PHOSPHATE SERPL-MCNC: 3.5 MG/DL — SIGNIFICANT CHANGE UP (ref 2.5–4.5)
PLATELET # BLD AUTO: 334 K/UL — SIGNIFICANT CHANGE UP (ref 150–400)
PMV BLD: 9.3 FL — SIGNIFICANT CHANGE UP (ref 7–13)
POTASSIUM SERPL-MCNC: 4.1 MMOL/L — SIGNIFICANT CHANGE UP (ref 3.5–5.3)
POTASSIUM SERPL-SCNC: 4.1 MMOL/L — SIGNIFICANT CHANGE UP (ref 3.5–5.3)
RBC # BLD: 2.86 M/UL — LOW (ref 4.2–5.8)
RBC # FLD: 15 % — HIGH (ref 10.3–14.5)
SODIUM SERPL-SCNC: 140 MMOL/L — SIGNIFICANT CHANGE UP (ref 135–145)
VANCOMYCIN FLD-MCNC: 16 UG/ML — SIGNIFICANT CHANGE UP
WBC # BLD: 6.46 K/UL — SIGNIFICANT CHANGE UP (ref 3.8–10.5)
WBC # FLD AUTO: 6.46 K/UL — SIGNIFICANT CHANGE UP (ref 3.8–10.5)

## 2019-08-18 PROCEDURE — 99233 SBSQ HOSP IP/OBS HIGH 50: CPT

## 2019-08-18 RX ADMIN — Medication 4 UNIT(S): at 12:11

## 2019-08-18 RX ADMIN — Medication 2: at 17:28

## 2019-08-18 RX ADMIN — Medication 1: at 12:11

## 2019-08-18 RX ADMIN — INSULIN GLARGINE 30 UNIT(S): 100 INJECTION, SOLUTION SUBCUTANEOUS at 21:17

## 2019-08-18 RX ADMIN — Medication 4 UNIT(S): at 08:21

## 2019-08-18 RX ADMIN — CHLORHEXIDINE GLUCONATE 1 APPLICATION(S): 213 SOLUTION TOPICAL at 12:11

## 2019-08-18 RX ADMIN — SIMVASTATIN 5 MILLIGRAM(S): 20 TABLET, FILM COATED ORAL at 21:17

## 2019-08-18 RX ADMIN — TAMSULOSIN HYDROCHLORIDE 0.4 MILLIGRAM(S): 0.4 CAPSULE ORAL at 21:17

## 2019-08-18 RX ADMIN — Medication 166.67 MILLIGRAM(S): at 12:25

## 2019-08-18 RX ADMIN — Medication 4 UNIT(S): at 17:28

## 2019-08-18 RX ADMIN — FAMOTIDINE 40 MILLIGRAM(S): 10 INJECTION INTRAVENOUS at 21:17

## 2019-08-18 NOTE — PROGRESS NOTE ADULT - ATTENDING COMMENTS
Medical Attending ACP Meeting note:  Met with HCP/daughter Kaiser on the floor for 30min re: ACP. Pt is Yakut speaking, daughter insisted to translate for the pt. According to daughter, pt did not know his cancer diagnosis (for the cultural reason) and has delegated all decision making authority to daughter Kaiser. Issue of AD's discussed with daughter, she understood and will consider about issue of DNR.
Both surgical sites appear stable today. Drain removed and sutured close with 3-0 nylon.  Will follow
Foot wounds appear stable and not grossly infected.  would consider right hallux bone biopsy if suspicion remains.  continue with local wound care for now.
D/C planning for rehab.
PICC placement and d/c planning for rehab.  Spoke to daughter at bedside about rehab and IV abx for his OM, she understood and agreed with the plan.
Spoke to HCP/daughter on the phone about rehab and f/u issues, she understood and agreed with the plan.

## 2019-08-18 NOTE — PROGRESS NOTE ADULT - PROBLEM SELECTOR PLAN 8
Likely pre-renal, improved   - monitor BMP, urine output overall improved however now increasing again   - monitor BMP, urine output  -encourage PO hydration  -avoid nephrotoxins  -renally dose meds

## 2019-08-18 NOTE — PROGRESS NOTE ADULT - SUBJECTIVE AND OBJECTIVE BOX
Patient is a 63y old  Male who presents with a chief complaint of Sepsis (11 Aug 2019 22:28)      SUBJECTIVE / OVERNIGHT EVENTS: No complaints.     MEDICATIONS  (STANDING):  chlorhexidine 4% Liquid 1 Application(s) Topical daily  chlorhexidine 4% Liquid 1 Application(s) Topical daily  dextrose 5%. 1000 milliLiter(s) (50 mL/Hr) IV Continuous <Continuous>  dextrose 50% Injectable 12.5 Gram(s) IV Push once  dextrose 50% Injectable 25 Gram(s) IV Push once  dextrose 50% Injectable 25 Gram(s) IV Push once  docusate sodium 100 milliGRAM(s) Oral three times a day  famotidine    Tablet 40 milliGRAM(s) Oral at bedtime  insulin glargine Injectable (LANTUS) 30 Unit(s) SubCutaneous at bedtime  insulin lispro (HumaLOG) corrective regimen sliding scale   SubCutaneous three times a day before meals  insulin lispro (HumaLOG) corrective regimen sliding scale   SubCutaneous at bedtime  insulin lispro Injectable (HumaLOG) 4 Unit(s) SubCutaneous three times a day before meals  polyethylene glycol 3350 17 Gram(s) Oral daily  senna 2 Tablet(s) Oral at bedtime  simvastatin 5 milliGRAM(s) Oral at bedtime  tamsulosin 0.4 milliGRAM(s) Oral at bedtime  vancomycin  IVPB 1250 milliGRAM(s) IV Intermittent every 24 hours    MEDICATIONS  (PRN):  acetaminophen   Tablet .. 650 milliGRAM(s) Oral every 6 hours PRN Mild Pain (1 - 3)  dextrose 40% Gel 15 Gram(s) Oral once PRN Blood Glucose LESS THAN 70 milliGRAM(s)/deciliter  glucagon  Injectable 1 milliGRAM(s) IntraMuscular once PRN Glucose LESS THAN 70 milligrams/deciliter      Vital Signs Last 24 Hrs  T(C): 36.7 (18 Aug 2019 12:09), Max: 36.8 (18 Aug 2019 06:39)  T(F): 98.1 (18 Aug 2019 12:09), Max: 98.3 (18 Aug 2019 06:39)  HR: 71 (18 Aug 2019 12:09) (68 - 76)  BP: 152/66 (18 Aug 2019 12:09) (145/74 - 152/66)  BP(mean): --  RR: 16 (18 Aug 2019 12:09) (16 - 17)  SpO2: 100% (18 Aug 2019 12:09) (97% - 100%)  CAPILLARY BLOOD GLUCOSE      POCT Blood Glucose.: 175 mg/dL (18 Aug 2019 11:52)  POCT Blood Glucose.: 145 mg/dL (18 Aug 2019 07:56)  POCT Blood Glucose.: 195 mg/dL (17 Aug 2019 21:48)  POCT Blood Glucose.: 216 mg/dL (17 Aug 2019 17:12)    I&O's Summary    17 Aug 2019 07:01  -  18 Aug 2019 07:00  --------------------------------------------------------  IN: 790 mL / OUT: 1400 mL / NET: -610 mL    18 Aug 2019 07:01  -  18 Aug 2019 13:10  --------------------------------------------------------  IN: 0 mL / OUT: 300 mL / NET: -300 mL        PHYSICAL EXAM:  GENERAL: NAD, well-developed  HEAD:  Atraumatic, Normocephalic  EYES: EOMI, PERRLA, conjunctiva and sclera clear  NECK: Supple, No JVD  CHEST/LUNG: Clear to auscultation bilaterally; No wheeze  HEART: Regular rate and rhythm; No murmurs, rubs, or gallops  ABDOMEN: Soft, Nontender, Nondistended; Bowel sounds present  : (+) Calderon  EXTREMITIES:  (+) dressing b/l feet  PSYCH: AAOx3  NEUROLOGY: non-focal  SKIN: No rashes or lesions    LABS:                        8.7    6.46  )-----------( 334      ( 18 Aug 2019 06:44 )             27.6     08-18    140  |  103  |  13  ----------------------------<  152<H>  4.1   |  28  |  1.35<H>    Ca    9.0      18 Aug 2019 06:44  Phos  3.5     08-18  Mg     2.1     08-18                RADIOLOGY & ADDITIONAL TESTS:    Imaging Personally Reviewed:    Consultant(s) Notes Reviewed:      Care Discussed with Consultants/Other Providers:

## 2019-08-19 ENCOUNTER — TRANSCRIPTION ENCOUNTER (OUTPATIENT)
Age: 64
End: 2019-08-19

## 2019-08-19 VITALS
HEART RATE: 77 BPM | SYSTOLIC BLOOD PRESSURE: 159 MMHG | RESPIRATION RATE: 17 BRPM | DIASTOLIC BLOOD PRESSURE: 77 MMHG | TEMPERATURE: 99 F | OXYGEN SATURATION: 98 %

## 2019-08-19 LAB
ANION GAP SERPL CALC-SCNC: 12 MMO/L — SIGNIFICANT CHANGE UP (ref 7–14)
BUN SERPL-MCNC: 14 MG/DL — SIGNIFICANT CHANGE UP (ref 7–23)
CALCIUM SERPL-MCNC: 8.9 MG/DL — SIGNIFICANT CHANGE UP (ref 8.4–10.5)
CHLORIDE SERPL-SCNC: 102 MMOL/L — SIGNIFICANT CHANGE UP (ref 98–107)
CO2 SERPL-SCNC: 27 MMOL/L — SIGNIFICANT CHANGE UP (ref 22–31)
CREAT SERPL-MCNC: 1.4 MG/DL — HIGH (ref 0.5–1.3)
GLUCOSE BLDC GLUCOMTR-MCNC: 98 MG/DL — SIGNIFICANT CHANGE UP (ref 70–99)
GLUCOSE SERPL-MCNC: 95 MG/DL — SIGNIFICANT CHANGE UP (ref 70–99)
HCT VFR BLD CALC: 29.3 % — LOW (ref 39–50)
HGB BLD-MCNC: 9 G/DL — LOW (ref 13–17)
MAGNESIUM SERPL-MCNC: 2.1 MG/DL — SIGNIFICANT CHANGE UP (ref 1.6–2.6)
MCHC RBC-ENTMCNC: 30 PG — SIGNIFICANT CHANGE UP (ref 27–34)
MCHC RBC-ENTMCNC: 30.7 % — LOW (ref 32–36)
MCV RBC AUTO: 97.7 FL — SIGNIFICANT CHANGE UP (ref 80–100)
NRBC # FLD: 0.02 K/UL — SIGNIFICANT CHANGE UP (ref 0–0)
PHOSPHATE SERPL-MCNC: 3.4 MG/DL — SIGNIFICANT CHANGE UP (ref 2.5–4.5)
PLATELET # BLD AUTO: 338 K/UL — SIGNIFICANT CHANGE UP (ref 150–400)
PMV BLD: 9.2 FL — SIGNIFICANT CHANGE UP (ref 7–13)
POTASSIUM SERPL-MCNC: 3.9 MMOL/L — SIGNIFICANT CHANGE UP (ref 3.5–5.3)
POTASSIUM SERPL-SCNC: 3.9 MMOL/L — SIGNIFICANT CHANGE UP (ref 3.5–5.3)
RBC # BLD: 3 M/UL — LOW (ref 4.2–5.8)
RBC # FLD: 15.1 % — HIGH (ref 10.3–14.5)
SODIUM SERPL-SCNC: 141 MMOL/L — SIGNIFICANT CHANGE UP (ref 135–145)
VANCOMYCIN FLD-MCNC: 18.5 UG/ML — SIGNIFICANT CHANGE UP
WBC # BLD: 6.97 K/UL — SIGNIFICANT CHANGE UP (ref 3.8–10.5)
WBC # FLD AUTO: 6.97 K/UL — SIGNIFICANT CHANGE UP (ref 3.8–10.5)

## 2019-08-19 PROCEDURE — 99239 HOSP IP/OBS DSCHRG MGMT >30: CPT

## 2019-08-19 RX ADMIN — CHLORHEXIDINE GLUCONATE 1 APPLICATION(S): 213 SOLUTION TOPICAL at 12:08

## 2019-08-19 RX ADMIN — Medication 4 UNIT(S): at 12:08

## 2019-08-19 RX ADMIN — Medication 2: at 12:08

## 2019-08-19 RX ADMIN — Medication 4 UNIT(S): at 08:47

## 2019-08-19 NOTE — PROGRESS NOTE ADULT - PROBLEM SELECTOR PLAN 4
pt developed urinary retention, Calderon inserted yesterday  -C/w Flomax  -may need outpatient  f/u
pt developed urinary retention, Calderon re-inserted  -C/w Flomax  -need outpatient  f/u
Etiology not clear, however resolved   -Passed TOV  -C/w Flomax  - consult appreciated.
pt developed urinary retention, Calderon re-inserted  -C/w Flomax  -may need outpatient  f/u
Etiology not clear, however resolved   -Passed TOV  -C/w Flomax  - consult appreciated.
Etiology not clear.   -Passed TOV last night  -C/w Flomax  - consult appreciated.
Likely myelosuppression from Gemzar, recently given on Monday  - continue to monitor CBC
Pt developed urinary retention, Dutton re-inserted during course after failed TOV  -C/w Flomax  -plan to dc w/ dutton w/ outpatient  f/u
pt developed urinary retention, Calderon re-inserted  -C/w Flomax  -may need outpatient  f/u
pt developed urinary retention, Calderon re-inserted  -C/w Flomax  -may need outpatient  f/u
pt developed urinary retention, Calderon re-inserted  -C/w Flomax  -need outpatient  f/u
Etiology not clear, however resolved   -Passed TOV  -C/w Flomax  - consult appreciated.
F/U A1c  - restart lantus at 20 units, home is 50 units, tailor according to blood sugars  - continue ISS, FS QID  - diabetic diet
Etiology not clear, however resolved   -Passed TOV  -C/w Flomax  - consult appreciated.
Etiology not clear, however resolved   -Passed TOV  -C/w Flomax  -Monitor PVR   - consult appreciated.

## 2019-08-19 NOTE — PROGRESS NOTE ADULT - PROBLEM SELECTOR PROBLEM 1
Osteomyelitis of left foot, unspecified type
Sepsis, due to unspecified organism
Osteomyelitis of left foot, unspecified type
Sepsis, due to unspecified organism
Osteomyelitis of left foot, unspecified type

## 2019-08-19 NOTE — PROGRESS NOTE ADULT - SUBJECTIVE AND OBJECTIVE BOX
Patient is a 63y old  Male who presents with a chief complaint of Sepsis (11 Aug 2019 22:28)    Used [facility]  ID # [id number]    SUBJECTIVE / OVERNIGHT EVENTS: No events over night. Pt denies SOB, CP, n/v. States pain is well controlled. Last BM was this am     Review of Systems:     MEDICATIONS  (STANDING):  chlorhexidine 4% Liquid 1 Application(s) Topical daily  chlorhexidine 4% Liquid 1 Application(s) Topical daily  dextrose 5%. 1000 milliLiter(s) (50 mL/Hr) IV Continuous <Continuous>  dextrose 50% Injectable 12.5 Gram(s) IV Push once  dextrose 50% Injectable 25 Gram(s) IV Push once  dextrose 50% Injectable 25 Gram(s) IV Push once  docusate sodium 100 milliGRAM(s) Oral three times a day  famotidine    Tablet 40 milliGRAM(s) Oral at bedtime  insulin glargine Injectable (LANTUS) 30 Unit(s) SubCutaneous at bedtime  insulin lispro (HumaLOG) corrective regimen sliding scale   SubCutaneous three times a day before meals  insulin lispro (HumaLOG) corrective regimen sliding scale   SubCutaneous at bedtime  insulin lispro Injectable (HumaLOG) 4 Unit(s) SubCutaneous three times a day before meals  polyethylene glycol 3350 17 Gram(s) Oral daily  senna 2 Tablet(s) Oral at bedtime  simvastatin 5 milliGRAM(s) Oral at bedtime  tamsulosin 0.4 milliGRAM(s) Oral at bedtime  vancomycin  IVPB 1250 milliGRAM(s) IV Intermittent every 24 hours    MEDICATIONS  (PRN):  acetaminophen   Tablet .. 650 milliGRAM(s) Oral every 6 hours PRN Mild Pain (1 - 3)  dextrose 40% Gel 15 Gram(s) Oral once PRN Blood Glucose LESS THAN 70 milliGRAM(s)/deciliter  glucagon  Injectable 1 milliGRAM(s) IntraMuscular once PRN Glucose LESS THAN 70 milligrams/deciliter      PHYSICAL EXAM:  Vital Signs Last 24 Hrs  T(C): 36.6 (19 Aug 2019 06:43), Max: 36.8 (18 Aug 2019 20:45)  T(F): 97.8 (19 Aug 2019 06:43), Max: 98.3 (18 Aug 2019 20:45)  HR: 74 (19 Aug 2019 06:43) (71 - 80)  BP: 155/73 (19 Aug 2019 06:43) (145/92 - 155/73)  RR: 16 (19 Aug 2019 06:43) (16 - 17)  SpO2: 97% (19 Aug 2019 06:43) (97% - 100%)  I&O's Summary    18 Aug 2019 07:01  -  19 Aug 2019 07:00  --------------------------------------------------------  IN: 980 mL / OUT: 1950 mL / NET: -970 mL    PHYSICAL EXAM:  GENERAL: NAD, well-developed  HEAD:  Atraumatic, Normocephalic  EYES: EOMI, conjunctiva and sclera clear  NECK: Supple,   CHEST/LUNG: Clear to auscultation bilaterally; No wheeze  HEART: Regular rate and rhythm; No murmurs, rubs, or gallops  ABDOMEN: Soft, Nontender, Nondistended; Bowel sounds present  : (+) Calderon with clear urine  EXTREMITIES:  (+) dressing b/l feet  PSYCH: AAOx3  NEUROLOGY: non-focal  SKIN: No rashes or lesions    LABS:  CAPILLARY BLOOD GLUCOSE      POCT Blood Glucose.: 98 mg/dL (19 Aug 2019 07:48)  POCT Blood Glucose.: 105 mg/dL (18 Aug 2019 20:59)  POCT Blood Glucose.: 249 mg/dL (18 Aug 2019 16:55)  POCT Blood Glucose.: 175 mg/dL (18 Aug 2019 11:52)                          9.0    6.97  )-----------( 338      ( 19 Aug 2019 06:44 )             29.3     08-19    141  |  102  |  14  ----------------------------<  95  3.9   |  27  |  1.40<H>    Ca    8.9      19 Aug 2019 06:44  Phos  3.4     08-19  Mg     2.1     08-19

## 2019-08-19 NOTE — PROGRESS NOTE ADULT - ASSESSMENT
63M with PMHx of HTN, DM2, extensive small cell lung cancer, metastatic to bone, and brain a/w sepsis 2/2  OM of left foot c/b WHITNEY s/p revisional TMA on 8/8/19 w/ OR cx (+) MRSA. Awaiting rehab.

## 2019-08-19 NOTE — PROGRESS NOTE ADULT - PROBLEM SELECTOR PROBLEM 2
Sepsis, due to unspecified organism
Malignant neoplasm of lung, unspecified laterality, unspecified part of lung
R/O Diabetic foot ulcer
Sepsis, due to unspecified organism
Malignant neoplasm of lung, unspecified laterality, unspecified part of lung
Sepsis, due to unspecified organism

## 2019-08-19 NOTE — PROGRESS NOTE ADULT - PROBLEM SELECTOR PROBLEM 4
R/O Urinary retention
Bicytopenia
R/O Urinary retention
Other specified diabetes mellitus without complication, without long-term current use of insulin
R/O Urinary retention
R/O Urinary retention

## 2019-08-19 NOTE — PROGRESS NOTE ADULT - PROBLEM SELECTOR PROBLEM 8
WHITNEY (acute kidney injury)
R/O Diabetic foot ulcer
WHITNEY (acute kidney injury)

## 2019-08-19 NOTE — PROGRESS NOTE ADULT - PROBLEM SELECTOR PLAN 2
Leukocytosis, fever at home of 102.7, HR 95 likely 2/2 OM- continue IV zosyn, ID recs appreciated   - Blood cxs negative to date; urine cx w/ >3 organisms likely contaminant    - monitor CBC, vitals
Leukocytosis, fever at home of 102.7, HR 95 likely 2/2 OM, MRSA (+) from wound cx  - Plan as above
Extensive small cell lung cancer, metastatic to bone, and brain. As per OP records, Dr. Thao discussed with patient/daughter CAT scan results in detail. Explained gradually progressive disease, incurability, and palliative treatment intent. Discussed treatment options at this point, including palliative care/best supportive care/hospice. Patient/daughter wished to continue with treatment at this time.   - holding chemo in setting of possible sepsis, currently on monotherapy with Gemzar for now  - Oncology consult appreciated
Leukocytosis, fever at home of 102.7, HR 95 likely 2/2 OM  - Plan as above  - Blood cxs negative to date; urine cx w/ >3 organisms likely contaminant    - monitor CBC, vitals
Leukocytosis, fever at home of 102.7, HR 95 likely 2/2 OM, MRSA (+) from wound cx  - Plan as above
Leukocytosis, fever at home of 102.7, HR 95 likely 2/2 OM- continue IV zosyn, ID recs appreciated   - Blood cxs negative to date; urine cx w/ >3 organisms likely contaminant    - monitor CBC, vitals
Leukocytosis, fever at home of 102.7, HR 95 likely 2/2 OM- continue IV zosyn, ID recs appreciated   - Blood cxs negative to date; urine cx w/ >3 organisms likely contaminant    - monitor CBC, vitals
Podiatry consult appreciated.  -f/u foot MRI to r/o JAVON  -C/w Zosyn for now
Leukocytosis, fever at home of 102.7, HR 95 likely 2/2 OM- continue IV zosyn, ID recs appreciated   - Blood cxs negative to date; urine cx w/ >3 organisms likely contaminant    - monitor CBC, vitals
Extensive small cell lung cancer, metastatic to bone, and brain. As per OP records, Dr. Thao discussed with patient/daughter CAT scan results in detail. Explained gradually progressive disease, incurability, and palliative treatment intent. Discussed treatment options at this point, including palliative care/best supportive care/hospice. Patient/daughter wished to continue with treatment at this time.   - holding chemo in setting of possible sepsis, currently on monotherapy with Gemzar for now  - Oncology consult
Leukocytosis, fever at home of 102.7, HR 95 likely 2/2 OM  - Plan as above  - Blood cxs negative to date; urine cx w/ >3 organisms likely contaminant    - monitor CBC, vitals
Leukocytosis, fever at home of 102.7, HR 95 likely 2/2 OM  - Plan as above  - Blood cxs negative to date; urine cx w/ >3 organisms likely contaminant    - monitor CBC, vitals
Leukocytosis, fever at home of 102.7, HR 95 likely 2/2 OM, MRSA (+) from wound cx  - Plan as above
Leukocytosis, fever at home of 102.7, HR 95 likely 2/2 OM, MRSA (+) from wound cx  - Plan as above

## 2019-08-19 NOTE — PROGRESS NOTE ADULT - PROBLEM SELECTOR PROBLEM 7
Essential hypertension
Chronic kidney disease, unspecified CKD stage
Essential hypertension
WHITNEY (acute kidney injury)
Essential hypertension
Essential hypertension

## 2019-08-19 NOTE — DISCHARGE NOTE NURSING/CASE MANAGEMENT/SOCIAL WORK - NSDCDPATPORTLINK_GEN_ALL_CORE
You can access the [facility], offered by [facility], by registering with the following website: [email or web address]

## 2019-08-19 NOTE — PROGRESS NOTE ADULT - PROBLEM SELECTOR PLAN 3
Extensive small cell lung cancer, metastatic to bone, and brain. As per OP records, Dr. Thao discussed with patient/daughter CAT scan results in detail. Explained gradually progressive disease, incurability, and palliative treatment intent. Discussed treatment options at this point, including palliative care/best supportive care/hospice. Patient/daughter wished to continue with treatment at this time.   - holding chemo in setting of possible sepsis, currently on monotherapy with Gemzar for now  - Oncology consult appreciated.
Extensive small cell lung cancer, metastatic to bone, and brain. As per OP records, Dr. Thao discussed with patient/daughter CAT scan results in detail. Explained gradually progressive disease, incurability, and palliative treatment intent. Discussed treatment options at this point, including palliative care/best supportive care/hospice. Patient/daughter wished to continue with treatment at this time.   - holding chemo in setting of possible sepsis, currently on monotherapy with Gemzar for now  - Oncology consult appreciated
Extensive small cell lung cancer, metastatic to bone, and brain. As per OP records, Dr. Thao discussed with patient/daughter CAT scan results in detail. Explained gradually progressive disease, incurability, and palliative treatment intent. Discussed treatment options at this point, including palliative care/best supportive care/hospice. Patient/daughter wished to continue with treatment at this time.   - holding chemo in setting of possible sepsis, currently on monotherapy with Gemzar for now  - Oncology consult appreciated
Etiology not clear.  -Calderon insertion  -Flomax  - consult
Etiology not clear.  -Calderon insertion  -Flomax  - consult appreciated.  -TOV on Sunday night
Etiology not clear. on Indwelling dutton  -TOV tonight, reinsert Dutton if pt fails TOV  -Flomax  - consult appreciated.
Extensive small cell lung cancer, metastatic to bone, and brain. As per OP records, Dr. Thao discussed with patient/daughter CAT scan results in detail. Explained gradually progressive disease, incurability, and palliative treatment intent. Discussed treatment options at this point, including palliative care/best supportive care/hospice. Patient/daughter wished to continue with treatment at this time.   - holding chemo for now in setting of infection, currently on monotherapy with Gemzar for now  - Oncology consult appreciated.  -f/u with Onc as an outpatient
Extensive small cell lung cancer, metastatic to bone, and brain. As per OP records, Dr. Thao discussed with patient/daughter CAT scan results in detail. Explained gradually progressive disease, incurability, and palliative treatment intent. Discussed treatment options at this point, including palliative care/best supportive care/hospice. Patient/daughter wished to continue with treatment at this time.   - holding chemo in setting of possible sepsis, currently on monotherapy with Gemzar for now  - Oncology consult appreciated
Extensive small cell lung cancer, metastatic to bone, and brain. As per OP records, Dr. Thao discussed with patient/daughter CAT scan results in detail. Explained gradually progressive disease, incurability, and palliative treatment intent. Discussed treatment options at this point, including palliative care/best supportive care/hospice. Patient/daughter wished to continue with treatment at this time.   - holding chemo in setting of possible sepsis, currently on monotherapy with Gemzar for now  - Oncology consult appreciated.  -f/u with Onc as an outpatient
Extensive small cell lung cancer, metastatic to bone, and brain. As per OP records, Dr. Thao discussed with patient/daughter CAT scan results in detail. Explained gradually progressive disease, incurability, and palliative treatment intent. Discussed treatment options at this point, including palliative care/best supportive care/hospice. Patient/daughter wished to continue with treatment at this time.   - holding chemo in setting of possible sepsis, currently on monotherapy with Gemzar for now  - Oncology consult appreciated
Likely myelosuppression from Gemzar, recently given on Monday  - continue to monitor CBC
Extensive small cell lung cancer, metastatic to bone, and brain. As per OP records, Dr. Thao discussed with patient/daughter CAT scan results in detail. Explained gradually progressive disease, incurability, and palliative treatment intent. Discussed treatment options at this point, including palliative care/best supportive care/hospice. Patient/daughter wished to continue with treatment at this time.   - holding chemo in setting of possible sepsis, currently on monotherapy with Gemzar for now  - Oncology consult appreciated

## 2019-08-19 NOTE — PROGRESS NOTE ADULT - PROBLEM SELECTOR PROBLEM 6
Other specified diabetes mellitus without complication, without long-term current use of insulin
Essential hypertension
Other specified diabetes mellitus without complication, without long-term current use of insulin
Chronic kidney disease, unspecified CKD stage
Other specified diabetes mellitus without complication, without long-term current use of insulin
Other specified diabetes mellitus without complication, without long-term current use of insulin

## 2019-08-19 NOTE — DISCHARGE NOTE NURSING/CASE MANAGEMENT/SOCIAL WORK - NSDCPNINST_GEN_ALL_CORE
As per Podiatry team dressing to be kept clean, dry and intact until follow up appointment with Dr. Lafleur within 1 week of discharge.

## 2019-08-19 NOTE — PROGRESS NOTE ADULT - PROBLEM SELECTOR PROBLEM 9
Prophylactic measure
R/O Diabetic foot ulcer
Prophylactic measure

## 2019-08-19 NOTE — PROGRESS NOTE ADULT - PROBLEM SELECTOR PLAN 6
-225 today  - cont. Lantus to 30U ( home is 50 unit, tailor according to blood sugars)  -cont.  pre-meal 4U Humalog 3X/day  - continue ISS, FS QID  -Titrate up insulin as indicated  - diabetic diet
FS still up and down.  - cont. Lantus to 30U ( home is 50 unit, tailor according to blood sugars)  -cont.  pre-meal 4U Humalog 3X/day  - continue ISS, FS QID  -Titrate up insulin as indicated  - diabetic diet
F/U A1c  - Increase Lantus to 30U ( home is 50 unit, tailor according to blood sugars)  - Add pre-meal 4U Humalog 3X/day  - continue ISS, FS QID  - diabetic diet
FS still up and down.  - cont. Lantus to 30U ( home is 50 unit, tailor according to blood sugars)  -cont.  pre-meal 4U Humalog 3X/day  - continue ISS, FS QID  -Titrate up insulin as indicated  - diabetic diet
- cont. Lantus to 30U ( home is 50 unit, tailor according to blood sugars)  -cont.  pre-meal 4U Humalog 3X/day  - continue ISS, FS QID  -Titrate up insulin as indicated  - diabetic diet
-262 today  - cont. Lantus to 30U ( home is 50 unit, tailor according to blood sugars)  -cont.  pre-meal 4U Humalog 3X/day  - continue ISS, FS QID  -Titrate up insulin as indicated  - diabetic diet
BP low-normal  - continue to monitor vitals
F/U A1c  - Increase Lantus to 30U ( home is 50 unit, tailor according to blood sugars  - Add pre-meal 4U Humalog 3X/day  - continue ISS, FS QID  - diabetic diet
F/U A1c  - Increase Lantus to 30U ( home is 50 unit, tailor according to blood sugars)  - Add pre-meal 4U Humalog 3X/day  - continue ISS, FS QID  - diabetic diet
FS better today  - cont. Lantus to 30U ( home is 50 unit, tailor according to blood sugars)  -cont.  pre-meal 4U Humalog 3X/day  - continue ISS, FS QID  -Titrate up insulin as indicated  - diabetic diet
FS still up and down.  - cont. Lantus to 30U ( home is 50 unit, tailor according to blood sugars)  -cont.  pre-meal 4U Humalog 3X/day  - continue ISS, FS QID  -Titrate up insulin as indicated  - diabetic diet
F/U A1c  - Increase Lantus to 30U ( home is 50 unit, tailor according to blood sugars)  - Add pre-meal 4U Humalog 3X/day  - continue ISS, FS QID  - diabetic diet
At baseline, Cr 1.57, improved to 1.29 today  - Decrease IV LR to 50 cc/hr  - renally dose medications, avoid nephrotoxic medications  - monitor BMP, urine output
F/U A1c  - Increase Lantus to 30U ( home is 50 unit, tailor according to blood sugars)  - Add pre-meal 4U Humalog 3X/day  - continue ISS, FS QID  - diabetic diet
F/U A1c  - Increase Lantus to 30U ( home is 50 unit, tailor according to blood sugars)  - Add pre-meal 4U Humalog 3X/day  - continue ISS, FS QID  - diabetic diet

## 2019-08-19 NOTE — PROGRESS NOTE ADULT - PROBLEM SELECTOR PLAN 7
BP low-normal  - continue to monitor vitals
- continue to monitor vitals off home antihypertensives for now
At baseline, Cr 1.57, improved to 1.13   - D/C IVF  - monitor BMP, urine output
At baseline, Cr 1.57, improved to 1.13 today  - D/C IVF  - monitor BMP, urine output
At baseline, Cr 1.57, improved to 1.29 today  - Decrease IV LR to 50 cc/hr  - renally dose medications, avoid nephrotoxic medications  - monitor BMP, urine output
BP low-normal  - continue to monitor vitals
Likely pre-renal, Cr 1.57  - continue IV LR @ 100 cc/hr  - renally dose medications, avoid nephrotoxic medications  - monitor BMP, urine output
BP low-normal  - continue to monitor vitals
BP low-normal  - continue to monitor vitals

## 2019-08-19 NOTE — PROGRESS NOTE ADULT - PROBLEM SELECTOR PLAN 5
Thrombocytopenia resolved.   -H/H remains stable. No evidence of active bleeding. Likely in setting of malignancy/chemo.   -Cont. to monitor CBC

## 2019-08-19 NOTE — PROGRESS NOTE ADULT - PROVIDER SPECIALTY LIST ADULT
Anesthesia
Heme/Onc
Heme/Onc
Hospitalist
Infectious Disease
Podiatry
Infectious Disease
Heme/Onc
Hospitalist

## 2019-08-19 NOTE — DISCHARGE NOTE NURSING/CASE MANAGEMENT/SOCIAL WORK - HAS THE PATIENT USED TOBACCO IN THE PAST 30 DAYS?
OPEN/DIRECT ENDOSCOPY SCREENING ASSESSMENT        PCP/Referring:  Rodrigo Romero M.D.    Procedure:  Colonoscopy    Diagnosis:  History of colon polyps    Diabetic:  No    Iron:  No    Anti-Coagulants:  No    ASA:  No    History of Reflux/Heartburn:  Yes, GERD controlled with PPI    Difficulty Swallowing:  No    Taking Any Acid Reflux Medication:  Prilosec 40 mg daily    Frequent Diarrhea Or Constipation:  no    History of GI Evaluations:  EGD  Yes - date: per patient many years ago                                                    Colonoscopy  Yes - date: 2-6-15  ( If yes, please obtain operative and pathology reports)    Family Hx Colon CA Or Polyps - colon cancer                Who  Father dx age 66    Review Of Systems:  Chest Pain: No                                           Shortness of Breath:  No                                       Previous Sedation Experience:  IV tolerated well    Pacemaker/Defibrillator:  No  H/O Coronary Stents:  No    Bleeding Tendencies: No  Patient To Check Insurance Coverage: yes diagnostic colonoscopy    Pharmacy:  [facility]                                Procedure Date:  Sept 7                                Procedure Time:  12 noon                       No

## 2019-08-19 NOTE — PROGRESS NOTE ADULT - PROBLEM SELECTOR PROBLEM 5
Bicytopenia
Other specified diabetes mellitus without complication, without long-term current use of insulin
Bicytopenia
Essential hypertension
Bicytopenia
Bicytopenia

## 2019-08-19 NOTE — PROGRESS NOTE ADULT - PROBLEM SELECTOR PLAN 1
MRI findings with "Soft tissue ulceration over lateral plantar aspect of the TMA stump with evidence for osteomyelitis involving the underlying 4th and 5th   metatarsal margins. The 4th and 5th metatarsal stumps are also noted to   be longer relative to the remaining metatarsal stump remnants suggesting an aggressive edge."  - S/p OR 8/8/19 for Left foot revisional TMA w/ PT/Achilles tenotomy and Right foot Hallux Bone Biopsy   -  OR cultures with MRSA, c/w Vanco as per ID  - Plan for 6 weeks of IV vancomycin as per ID

## 2019-08-19 NOTE — PROGRESS NOTE ADULT - PROBLEM SELECTOR PLAN 9
DVT PPx: ICD  Diet: DASH/TLC, Diabetic  Dispo: PT consulted  Code status: Full code  Daughter Lin is [phone]
DVT PPx: SCDs 2/2 thrombocytopenia/anemia  Diet: DASH/TLC, Diabetic  Dispo: PT consulted, home w/ home PT  Code status: Full code  Daughter Lin is [phone]
DVT PPx: SCDs 2/2 thrombocytopenia/anemia  Diet: DASH/TLC, Diabetic  Dispo: PT consulted, home w/ home PT  Code status: Full code  Daughter Lin is [phone]
DVT PPx: SCDs 2/2 thrombocytopenia/anemia  Diet: DASH/TLC, Diabetic  Dispo: PT consulted, home w/ home PT  Code status: Full code  Daughter Lin is [phone]
DVT PPx: SCDs 2/2 thrombocytopenia/anemia  Diet: DASH/TLC, Diabetic  Dispo: PT consulted, home w/ home PT  Code status: Full code  Daughter Lin is [phone]
DVT PPx: SCDs 2/2 thrombocytopenia/anemia  Diet: DASH/TLC, Diabetic  Dispo: PT consulted, home w/ home PT  Code status: Full code  Daughter Lin is [phone]
DVT PPx: SCDs 2/2 thrombocytopenia/anemia  Diet: DASH/TLC, Diabetic  Dispo: PT consulted, home w/ home PT  Code status: Full code  Daughter Lin is [phone]
DVT PPx: SCDs 2/2 anemia  Dispo: PT 8/18 rec rehab  Code status: Full code  Daughter Lin is [facility] [phone]
DVT PPx: SCDs 2/2 thrombocytopenia  Diet: DASH/TLC, Diabetic  Dispo: PT consulted, home w/ home PT  Code status: Full code  Daughter Lin is [phone]
DVT PPx: SCDs 2/2 thrombocytopenia  Diet: DASH/TLC, Diabetic  Dispo: PT consulted, home w/ home PT  Code status: Full code  Daughter Lin is [phone]
DVT PPx: SCDs 2/2 thrombocytopenia  Diet: DASH/TLC, Diabetic  Dispo: PT consulted, home w/ home PT  Code status: Full code  Daughter Lin is [phone]
DVT PPx: SCDs 2/2 thrombocytopenia/anemia  Diet: DASH/TLC, Diabetic  Dispo: PT consulted, home w/ home PT  Code status: Full code  Daughter Lin is [phone]
DVT PPx: SCDs 2/2 thrombocytopenia/anemia  Diet: DASH/TLC, Diabetic  Dispo: PT consulted, home w/ home PT  Code status: Full code  Daughter Lin is [phone]
DVT PPx: SCDs 2/2 thrombocytopenia/anemia  Diet: DASH/TLC, Diabetic  Dispo: PT consulted, home w/ home PT  Code status: Full code  Daughter Lin is [phone]
DVT PPx: SCDs 2/2 thrombocytopenia/anemia  Diet: DASH/TLC, Diabetic  Dispo: PT consulted, home w/ home PT  Code status: Full code  Daughter Lin is [phone]
Podiatry consult appreciated.  -f/u foot MRI to r/o OM
Podiatry consult appreciated.  -f/u foot MRI to r/o OM
Podiatry consult appreciated.  -f/u foot x-ray
DVT PPx: SCDs 2/2 thrombocytopenia  Diet: DASH/TLC, Diabetic  Dispo: PT consulted, home w/ home PT  Code status: Full code  Daughter Lin is [phone]

## 2019-08-30 ENCOUNTER — CHART COPY (OUTPATIENT)
Age: 64
End: 2019-08-30

## 2019-08-30 ENCOUNTER — OUTPATIENT (OUTPATIENT)
Dept: OUTPATIENT SERVICES | Facility: HOSPITAL | Age: 64
LOS: 1 days | Discharge: HOME | End: 2019-08-30

## 2019-08-30 DIAGNOSIS — Z98.890 OTHER SPECIFIED POSTPROCEDURAL STATES: Chronic | ICD-10-CM

## 2019-08-30 DIAGNOSIS — C34.90 MALIGNANT NEOPLASM OF UNSPECIFIED PART OF UNSPECIFIED BRONCHUS OR LUNG: ICD-10-CM

## 2019-09-04 ENCOUNTER — RESULT REVIEW (OUTPATIENT)
Age: 64
End: 2019-09-04

## 2019-09-04 ENCOUNTER — APPOINTMENT (OUTPATIENT)
Dept: HEMATOLOGY ONCOLOGY | Facility: CLINIC | Age: 64
End: 2019-09-04
Payer: MEDICAID

## 2019-09-04 VITALS
SYSTOLIC BLOOD PRESSURE: 128 MMHG | TEMPERATURE: 98.4 F | RESPIRATION RATE: 16 BRPM | OXYGEN SATURATION: 96 % | DIASTOLIC BLOOD PRESSURE: 75 MMHG | BODY MASS INDEX: 22.97 KG/M2 | WEIGHT: 138.01 LBS | HEART RATE: 89 BPM

## 2019-09-04 LAB
ALBUMIN SERPL ELPH-MCNC: 4 G/DL
ALP BLD-CCNC: 116 U/L
ALT SERPL-CCNC: 23 U/L
ANION GAP SERPL CALC-SCNC: 18 MMOL/L
AST SERPL-CCNC: 20 U/L
BASOPHILS # BLD AUTO: 0 K/UL — SIGNIFICANT CHANGE UP (ref 0–0.2)
BASOPHILS NFR BLD AUTO: 0.3 % — SIGNIFICANT CHANGE UP (ref 0–2)
BILIRUB SERPL-MCNC: 0.3 MG/DL
BUN SERPL-MCNC: 19 MG/DL
CALCIUM SERPL-MCNC: 9.1 MG/DL
CHLORIDE SERPL-SCNC: 99 MMOL/L
CO2 SERPL-SCNC: 23 MMOL/L
CREAT SERPL-MCNC: 1.48 MG/DL
CRP SERPL-MCNC: 0.96 MG/DL
EOSINOPHIL # BLD AUTO: 0.8 K/UL — HIGH (ref 0–0.5)
EOSINOPHIL NFR BLD AUTO: 10.1 % — HIGH (ref 0–6)
ERYTHROCYTE [SEDIMENTATION RATE] IN BLOOD: 91 MM/HR — HIGH (ref 0–20)
GLUCOSE SERPL-MCNC: 293 MG/DL
HCT VFR BLD CALC: 35.9 % — LOW (ref 39–50)
HGB BLD-MCNC: 11.5 G/DL — LOW (ref 13–17)
LYMPHOCYTES # BLD AUTO: 1.9 K/UL — SIGNIFICANT CHANGE UP (ref 1–3.3)
LYMPHOCYTES # BLD AUTO: 22.6 % — SIGNIFICANT CHANGE UP (ref 13–44)
MCHC RBC-ENTMCNC: 30.7 PG — SIGNIFICANT CHANGE UP (ref 27–34)
MCHC RBC-ENTMCNC: 31.9 G/DL — LOW (ref 32–36)
MCV RBC AUTO: 96.4 FL — SIGNIFICANT CHANGE UP (ref 80–100)
MONOCYTES # BLD AUTO: 0.8 K/UL — SIGNIFICANT CHANGE UP (ref 0–0.9)
MONOCYTES NFR BLD AUTO: 9.3 % — SIGNIFICANT CHANGE UP (ref 2–14)
NEUTROPHILS # BLD AUTO: 4.8 K/UL — SIGNIFICANT CHANGE UP (ref 1.8–7.4)
NEUTROPHILS NFR BLD AUTO: 57.7 % — SIGNIFICANT CHANGE UP (ref 43–77)
PLATELET # BLD AUTO: 209 K/UL — SIGNIFICANT CHANGE UP (ref 150–400)
POTASSIUM SERPL-SCNC: 4.4 MMOL/L
PROT SERPL-MCNC: 7.8 G/DL
RBC # BLD: 3.73 M/UL — LOW (ref 4.2–5.8)
RBC # FLD: 14.5 % — SIGNIFICANT CHANGE UP (ref 10.3–14.5)
SODIUM SERPL-SCNC: 140 MMOL/L
WBC # BLD: 8.4 K/UL — SIGNIFICANT CHANGE UP (ref 3.8–10.5)
WBC # FLD AUTO: 8.4 K/UL — SIGNIFICANT CHANGE UP (ref 3.8–10.5)

## 2019-09-04 PROCEDURE — 99215 OFFICE O/P EST HI 40 MIN: CPT

## 2019-09-04 NOTE — ASSESSMENT
[FreeTextEntry1] : Patient with extensive small cell lung cancer, metastatic to bone, and brain. Status post transmetatarsal amputation left foot with course complicated by MRSA requiring continued IV antibiotics. Telephone call to patient's podiatrist Dr. Lafleur-he stated he will be seeing patient weekly for ongoing management of his left foot wound. He suggested weekly CRP/sedimentation rates. He will let us know when chemotherapy may be resumed from his/wound healing standpoint.\par Patient and his daughter were given the opportunity to ask questions. Their questions have been answered to their apparent satisfaction at this time.

## 2019-09-04 NOTE — PHYSICAL EXAM
[Normal] : affect appropriate [de-identified] : decreased BS bases [de-identified] : non-toxic appearing [de-identified] : chronic venous stasis changes; no calf tenderness; trace B/L pedal edema; LUE PICC line in place [de-identified] : soft, NT without palpable hepatosplenomegaly [de-identified] : s/p TMA left foot-bandaging in place [de-identified] : papery thin; dry [de-identified] : alert, cooperative; no gross focal motor deficits

## 2019-09-04 NOTE — HISTORY OF PRESENT ILLNESS
[Disease: _____________________] : Disease: [unfilled] [T: ___] : T[unfilled] [M: ___] : M[unfilled] [N: ___] : N[unfilled] [AJCC Stage: ____] : AJCC Stage: [unfilled] [de-identified] : small cell carcinoma [de-identified] : Patient came to the [place] from [facility] 9/2018. He reported an approximately one-year history of a cough. Recently had had some associated chest discomfort. He saw his primary care physician for evaluation, and chest x-ray 11/2018 showed a left upper lobe masslike opacity measuring 3.7 cm in maximum dimension. Also enlargement of the left hilum. Pleural thickening adjacent to the masslike opacity. PET/CT scan showed a hypermetabolic spiculated left upper lobe lung mass with gross involvement of the mid to upper left pleura with associated loculated effusions. Suspicious hypermetabolic regional megan disease in the left hilum and left anterior mediastinum. Hypermetabolic lytic foci in the axial skeleton suspicious for multifocal osteolytic metastatic disease. A hypermetabolic nodule noted in the left lobe of the thyroid, nonspecific.\par FB, EBUS biopsy 12/3/18 performed (Dr. Looney). Left upper lobe endobronchial lesion/mass consistent with small cell carcinoma. BAL of left upper lobe negative for malignant cells. Patient began Etoposide/Carboplatin 12/31/18, completing 4th cycle 3/8/19.\par 3/2019-CT scan of the chest/abdomen/pelvis (addended report)-when compared to 11/2018 PET/CT scan showed the largest spiculated nodule of the left upper lobe of the lung improved, prominent left pleural thickening associated with the left upper lobe mass, markedly improved, other pleural-based nodules near completely resolved. Interval resolution of pleural effusions. Marked interval improvement in mediastinal and hilar adenopathy. New lytic and sclerotic lesions of bone. \par 4/2019-brain MRI showed a new tiny enhancing lesion in the left frontal subcortical region compatible with metastasis. Patient underwent gamma knife radiosurgery, completing 5/2019. 4/2019-Began Nivolumab.\par 7/2019-CT scan chest/abdomen/pelvis-new left upper lobe satellite nodules with increased left mediastinal and left hilar adenopathy, consistent with disease progression. Interval pathologic fracture of right posterior sixth rib, otherwise stable osseous metastases. Began Gemzar. [de-identified] : Status post hospitalization for left foot transmetatarsal amputation. Patient is currently at the [facility] in [place], for rehabilitation (phone #[phone]). His physician there is  (phone #[phone]).\par Patient remains on IV vancomycin (via PICC line) for MRSA infection- he has approximately 3 more weeks of a planned six-week regimen.\par He generally currently is feeling well. No complaints of chest pain, shortness of breath, cough, or fevers. No current complaints of bone pain. No headaches, nausea/vomiting.\par Patient was accompanied today by his daughter (health care proxy) Lin.\par

## 2019-09-07 ENCOUNTER — FORM ENCOUNTER (OUTPATIENT)
Age: 64
End: 2019-09-07

## 2019-09-08 ENCOUNTER — APPOINTMENT (OUTPATIENT)
Dept: MRI IMAGING | Facility: IMAGING CENTER | Age: 64
End: 2019-09-08
Payer: MEDICAID

## 2019-09-08 ENCOUNTER — OUTPATIENT (OUTPATIENT)
Dept: OUTPATIENT SERVICES | Facility: HOSPITAL | Age: 64
LOS: 1 days | End: 2019-09-08
Payer: MEDICAID

## 2019-09-08 DIAGNOSIS — C79.31 SECONDARY MALIGNANT NEOPLASM OF BRAIN: ICD-10-CM

## 2019-09-08 DIAGNOSIS — Z98.890 OTHER SPECIFIED POSTPROCEDURAL STATES: Chronic | ICD-10-CM

## 2019-09-08 PROCEDURE — 70553 MRI BRAIN STEM W/O & W/DYE: CPT

## 2019-09-08 PROCEDURE — 70553 MRI BRAIN STEM W/O & W/DYE: CPT | Mod: 26

## 2019-09-08 PROCEDURE — A9585: CPT

## 2019-09-10 LAB
FUNGUS SPEC QL CULT: SIGNIFICANT CHANGE UP
FUNGUS SPEC QL CULT: SIGNIFICANT CHANGE UP

## 2019-09-11 ENCOUNTER — APPOINTMENT (OUTPATIENT)
Dept: RADIATION ONCOLOGY | Facility: CLINIC | Age: 64
End: 2019-09-11
Payer: MEDICAID

## 2019-09-11 VITALS
WEIGHT: 138 LBS | RESPIRATION RATE: 16 BRPM | HEIGHT: 65 IN | BODY MASS INDEX: 22.99 KG/M2 | SYSTOLIC BLOOD PRESSURE: 117 MMHG | DIASTOLIC BLOOD PRESSURE: 71 MMHG | TEMPERATURE: 36.9 F | HEART RATE: 86 BPM | OXYGEN SATURATION: 100 %

## 2019-09-11 PROCEDURE — 77262 THER RADIOLOGY TX PLNG INTRM: CPT

## 2019-09-11 PROCEDURE — 99214 OFFICE O/P EST MOD 30 MIN: CPT | Mod: GC,25

## 2019-09-11 NOTE — VITALS
[Maximal Pain Intensity: 0/10] : 0/10 [Least Pain Intensity: 0/10] : 0/10 [50: Requires considerable assistance and frequent medical care.] : 50: Requires considerable assistance and frequent medical care.

## 2019-09-12 NOTE — REVIEW OF SYSTEMS
[Fatigue] : fatigue [Negative] : Neurological [Chills] : no chills [Night Sweats] : no night sweats [Dysphagia] : no dysphagia [Odynophagia] : no odynophagia [Confused] : no confusion [Dizziness] : no dizziness [Fainting] : no fainting [Difficulty Walking] : no difficulty walking [FreeTextEntry3] : right sided vision loss for the past year [FreeTextEntry9] : bilateral LE weakness, with bandaged bilateral LE

## 2019-09-12 NOTE — DATA REVIEWED
[FreeTextEntry1] : I have personally reviewed her most recent MRI and compared it with her previous scans.\par

## 2019-09-12 NOTE — HISTORY OF PRESENT ILLNESS
[FreeTextEntry1] :  MD Moura completed GK to the the left frontal region on 5/16/19. He presents for a f/u visit with new MRI. \par \par ONCOLOGY HISTORY\par 63-year-old man who came [place] the [place] in [place] 2018 and had reported a one year history of cough and had mild associated chest discomfort. Chest x-ray 11/2018 showed left upper lobe masslike opacity measuring 2.7 cm as well as enlarged left hilum. There were pleural thickening adjacent to the masslike opacity. PET scan showed hypermetabolic spiculated left upper lobe lung mass with gross involvement of the mid upper left pleura associated loculated effusion. Suspicious hypermetabolic regional node in the left hilum and left anterior mediastinum and, lytic lesions in the axial skeleton suspicious for multifocal disease. \par \par 12/30/18 by Dr. Hawthorne : EBUS of the left upper lobe lesion showed small cell carcinoma. BAL of left upper lobe negative for malignancy. He started etoposide/carboplatin on 12/31/18, completed 4 cycles on 3/8/19.\par \par March 2019 CT scan showed partial response in the largest spiculated nodule in the left upper lobe, improved left pleural thickening and near resolution of the pleural-based nodules. There were improvement in mediastinal and hilar adenopathy. There are new lytic and sclerotic lesions in the bones (left sacrum, pubic symphysis, Lumbar vertebral body, posterior right sixth rib, posterior lateral left 4th rib. Sclerotic foci in T2 and L4 vertebral body), He started the Nivolumab on 4/2019\par \par MRI 4/29/19 showed new tiny enhancing lesion in the left frontal subcortical region measuring 2.1 mm.  At the time of initial consult he had no symptoms from this metastasis. He was treated with GK. \par \par On 9/8/19 Mri brain showed Interval appearance of approximately 11 new enhancing parenchymal nodules with surrounding edema, most within the left frontal and temporal lobes, and one within the right anterior parietal lobe. \par \par The largest is in the left posterolateral temporal lobe, measuring 1.2 x 1.0 cm. The second largest is in the left anterolateral frontal lobe, measuring 0.9 x 0.9 cm. The remaining foci are subcentimeter in size. No abnormal leptomeningeal enhancement, although some of the new lesions border sulci. Previously seen enhancing left lateral frontal focus has resolved. \par \par 9/10/19- Mr. Moura presents today for follow up. He completed 2000 cgy of radiation to the left frontal region on 5/16/19. \par He began gemzar treatment in 7/2019 after CT scans showed new QING satellite nodules with increased left mediastinal and left hilar adenopathy, consistent with disease progression. Had a left foot transetatarsal amputation. Still on IV antibiotics. Chemo cannot be resumed at this time. \par \par Today he is feeling generalized weakness. He is unable to walk due to pain in his feet from the MRSA. \par He denies headaches, nausea, vomiting, focal weakness. Notes numbness in his thumb and forefinger bilaterally. He has almost no vision in the right eye for almost 1 year. He complains of constipation. Appears weak at the time of visit .\par \par He is currently at [facility].

## 2019-09-12 NOTE — LETTER CLOSING
[Sincerely yours,] : Sincerely yours, [FreeTextEntry3] : Case Villalobos MD\par Attending Physician\par Department of Radiation Medicine\par \par \par

## 2019-09-12 NOTE — PHYSICAL EXAM
[Heart Sounds] : normal S1 and S2 [Normal] : normoactive bowel sounds, soft and nontender, no hepatosplenomegaly or masses appreciated [Oriented To Time, Place, And Person] : oriented to person, place, and time [Bowel Sounds] : normal bowel sounds [de-identified] : weak appearing male, in a wheelchair at the time of visit.  [de-identified] : bilateral LE weakness. Bilatearl feet wrapped and bandaged. 5+ strength bilateral UE, 3+ strength bilateral LE [de-identified] : cranial nerves 2-12 grossly intact aside from dark vision to right eye

## 2019-09-16 PROCEDURE — 77334 RADIATION TREATMENT AID(S): CPT | Mod: 26

## 2019-09-16 PROCEDURE — 77290 THER RAD SIMULAJ FIELD CPLX: CPT | Mod: 26

## 2019-09-16 RX ORDER — LACTULOSE 10 G/15ML
10 SOLUTION ORAL
Refills: 0 | Status: ACTIVE | COMMUNITY
Start: 2019-09-16

## 2019-09-16 RX ORDER — INSULIN LISPRO 100 U/ML
100 INJECTION, SOLUTION SUBCUTANEOUS
Refills: 0 | Status: ACTIVE | COMMUNITY
Start: 2019-09-16

## 2019-09-16 RX ORDER — TAMSULOSIN HYDROCHLORIDE 0.4 MG/1
0.4 CAPSULE ORAL
Refills: 0 | Status: ACTIVE | COMMUNITY
Start: 2019-09-16

## 2019-09-16 RX ORDER — FAMOTIDINE 40 MG/1
40 TABLET, FILM COATED ORAL
Refills: 0 | Status: ACTIVE | COMMUNITY
Start: 2019-09-16

## 2019-09-16 RX ORDER — COLLAGENASE SANTYL 250 [ARB'U]/G
250 OINTMENT TOPICAL
Refills: 0 | Status: ACTIVE | COMMUNITY
Start: 2019-09-16

## 2019-09-16 RX ORDER — BETHANECHOL CHLORIDE 10 MG/1
10 TABLET ORAL
Refills: 0 | Status: ACTIVE | COMMUNITY
Start: 2019-09-16

## 2019-09-18 ENCOUNTER — RESULT REVIEW (OUTPATIENT)
Age: 64
End: 2019-09-18

## 2019-09-18 ENCOUNTER — APPOINTMENT (OUTPATIENT)
Dept: HEMATOLOGY ONCOLOGY | Facility: CLINIC | Age: 64
End: 2019-09-18
Payer: MEDICAID

## 2019-09-18 VITALS
OXYGEN SATURATION: 98 % | HEART RATE: 83 BPM | SYSTOLIC BLOOD PRESSURE: 143 MMHG | RESPIRATION RATE: 14 BRPM | DIASTOLIC BLOOD PRESSURE: 82 MMHG | TEMPERATURE: 98.2 F

## 2019-09-18 LAB
ALBUMIN SERPL ELPH-MCNC: 3.7 G/DL
ALP BLD-CCNC: 150 U/L
ALT SERPL-CCNC: 24 U/L
ANION GAP SERPL CALC-SCNC: 12 MMOL/L
AST SERPL-CCNC: 18 U/L
BASOPHILS # BLD AUTO: 0 K/UL — SIGNIFICANT CHANGE UP (ref 0–0.2)
BASOPHILS NFR BLD AUTO: 0.6 % — SIGNIFICANT CHANGE UP (ref 0–2)
BILIRUB SERPL-MCNC: 0.3 MG/DL
BUN SERPL-MCNC: 14 MG/DL
CALCIUM SERPL-MCNC: 9 MG/DL
CHLORIDE SERPL-SCNC: 99 MMOL/L
CO2 SERPL-SCNC: 29 MMOL/L
CREAT SERPL-MCNC: 1.27 MG/DL
EOSINOPHIL # BLD AUTO: 1.5 K/UL — HIGH (ref 0–0.5)
EOSINOPHIL NFR BLD AUTO: 17.2 % — HIGH (ref 0–6)
GLUCOSE SERPL-MCNC: 324 MG/DL
HCT VFR BLD CALC: 32.8 % — LOW (ref 39–50)
HGB BLD-MCNC: 10.5 G/DL — LOW (ref 13–17)
LYMPHOCYTES # BLD AUTO: 1.6 K/UL — SIGNIFICANT CHANGE UP (ref 1–3.3)
LYMPHOCYTES # BLD AUTO: 18.7 % — SIGNIFICANT CHANGE UP (ref 13–44)
MCHC RBC-ENTMCNC: 30.3 PG — SIGNIFICANT CHANGE UP (ref 27–34)
MCHC RBC-ENTMCNC: 32 G/DL — SIGNIFICANT CHANGE UP (ref 32–36)
MCV RBC AUTO: 94.7 FL — SIGNIFICANT CHANGE UP (ref 80–100)
MONOCYTES # BLD AUTO: 0.8 K/UL — SIGNIFICANT CHANGE UP (ref 0–0.9)
MONOCYTES NFR BLD AUTO: 9.5 % — SIGNIFICANT CHANGE UP (ref 2–14)
NEUTROPHILS # BLD AUTO: 4.7 K/UL — SIGNIFICANT CHANGE UP (ref 1.8–7.4)
NEUTROPHILS NFR BLD AUTO: 54.1 % — SIGNIFICANT CHANGE UP (ref 43–77)
PLATELET # BLD AUTO: 198 K/UL — SIGNIFICANT CHANGE UP (ref 150–400)
POTASSIUM SERPL-SCNC: 4.6 MMOL/L
PROT SERPL-MCNC: 7.3 G/DL
RBC # BLD: 3.47 M/UL — LOW (ref 4.2–5.8)
RBC # FLD: 14 % — SIGNIFICANT CHANGE UP (ref 10.3–14.5)
SODIUM SERPL-SCNC: 140 MMOL/L
WBC # BLD: 8.8 K/UL — SIGNIFICANT CHANGE UP (ref 3.8–10.5)
WBC # FLD AUTO: 8.8 K/UL — SIGNIFICANT CHANGE UP (ref 3.8–10.5)

## 2019-09-18 PROCEDURE — 77334 RADIATION TREATMENT AID(S): CPT | Mod: 26

## 2019-09-18 PROCEDURE — 77307 TELETHX ISODOSE PLAN CPLX: CPT | Mod: 26

## 2019-09-18 PROCEDURE — 99215 OFFICE O/P EST HI 40 MIN: CPT

## 2019-09-18 PROCEDURE — 77280 THER RAD SIMULAJ FIELD SMPL: CPT | Mod: 26

## 2019-09-18 NOTE — REVIEW OF SYSTEMS
[Negative] : Allergic/Immunologic [Muscle Pain] : no muscle pain [Skin Rash] : no skin rash [Fainting] : no fainting [FreeTextEntry6] : +intermittent cough

## 2019-09-18 NOTE — ASSESSMENT
[FreeTextEntry1] : Patient with extensive small cell lung cancer, metastatic to bone, and brain. Completing antibiotic course for transmetatarsal amputation of his left foot/MRSA infection. I have left a message for patient's podiatrist Dr. Lafleur to call me back so that I may discuss with him-? cleared for resumption of systemic chemotherapy.\par Patient to proceed with whole brain radiation per radiation oncology.\par Will tentatively schedule Gemzar resumption-potential side effects reviewed with patient/daughter. To obtain new baseline CAT scan of the chest prior to chemotherapy.\par Patient and his daughter were given the opportunity to ask questions. Their questions have been answered to their apparent satisfaction at this time.

## 2019-09-18 NOTE — RESULTS/DATA
[FreeTextEntry1] : MRI of the brain-new metastatic lesions with previously seen enhancing left lateral frontal focus resolved.

## 2019-09-18 NOTE — ADDENDUM
[FreeTextEntry1] : Spoke with Dr. Lafleur-he states patient is cleared from a wound healing viewpoint to proceed with chemo as planned. He continues to see patient weekly.\par \par Following appt. today, received patient's lab work-telephone call to Lin and informed her of elevated glucose-she states patient takes insulin and continues to monitor glucose at home.

## 2019-09-18 NOTE — PHYSICAL EXAM
[Normal] : affect appropriate [de-identified] : non-toxic appearing [de-identified] : decreased BS bases [de-identified] : chronic venous stasis changes RLE; no right calf tenderness; LLE bandaging in place [de-identified] : soft, NT without palpable hepatosplenomegaly [de-identified] : s/p TMA left foot-bandaging in place [de-identified] : papery thin; dry [de-identified] : cooperative; generally weak

## 2019-09-18 NOTE — CONSULT LETTER
[Dear  ___] : Dear  [unfilled], [Courtesy Letter:] : I had the pleasure of seeing your patient, [unfilled], in my office today. [Please see my note below.] : Please see my note below. [Sincerely,] : Sincerely, [Consult Closing:] : Thank you very much for allowing me to participate in the care of this patient.  If you have any questions, please do not hesitate to contact me. [FreeTextEntry3] : Joseline Thao M.D.

## 2019-09-18 NOTE — HISTORY OF PRESENT ILLNESS
[Disease: _____________________] : Disease: [unfilled] [T: ___] : T[unfilled] [N: ___] : N[unfilled] [M: ___] : M[unfilled] [AJCC Stage: ____] : AJCC Stage: [unfilled] [de-identified] : Patient came [place] the [place] from [facility] 9/2018. He reported an approximately one-year history of a cough. Recently had had some associated chest discomfort. He saw his primary care physician for evaluation, and chest x-ray 11/2018 showed a left upper lobe masslike opacity measuring 3.7 cm in maximum dimension. Also enlargement of the left hilum. Pleural thickening adjacent to the masslike opacity. PET/CT scan showed a hypermetabolic spiculated left upper lobe lung mass with gross involvement of the mid to upper left pleura with associated loculated effusions. Suspicious hypermetabolic regional megan disease in the left hilum and left anterior mediastinum. Hypermetabolic lytic foci in the axial skeleton suspicious for multifocal osteolytic metastatic disease. A hypermetabolic nodule noted in the left lobe of the thyroid, nonspecific.\par FB, EBUS biopsy 12/3/18 performed (Dr. Looney). Left upper lobe endobronchial lesion/mass consistent with small cell carcinoma. BAL of left upper lobe negative for malignant cells. Patient began Etoposide/Carboplatin 12/31/18, completing 4th cycle 3/8/19.\par 3/2019-CT scan of the chest/abdomen/pelvis (addended report)-when compared to 11/2018 PET/CT scan showed the largest spiculated nodule of the left upper lobe of the lung improved, prominent left pleural thickening associated with the left upper lobe mass, markedly improved, other pleural-based nodules near completely resolved. Interval resolution of pleural effusions. Marked interval improvement in mediastinal and hilar adenopathy. New lytic and sclerotic lesions of bone. \par 4/2019-brain MRI showed a new tiny enhancing lesion in the left frontal subcortical region compatible with metastasis. Patient underwent gamma knife radiosurgery, completing 5/2019. 4/2019-Began Nivolumab.\par 7/2019-CT scan chest/abdomen/pelvis-new left upper lobe satellite nodules with increased left mediastinal and left hilar adenopathy, consistent with disease progression. Interval pathologic fracture of right posterior sixth rib, otherwise stable osseous metastases. Began Gemzar.\par 9/2019-MRI of the brain showed multiple new metastatic lesions. Patient proceeding with whole brain radiation therapy. [de-identified] : small cell carcinoma [de-identified] : Completed antibiotic for osteomyelitis today. Still in rehab facility. Patient anxious to return home.\par Getting RT simulation today, with plans for 10 radiation treatments to the whole brain.\par Has an intermittent cough. No associated chest pain, shortness of breath, hemoptysis, or fevers.\par No current complaints of bone pain. No headaches, nausea/vomiting.\par In WC most of time now due to foot issues.\par Patient was accompanied today by his daughter (health care proxy) Lin.\par

## 2019-09-23 ENCOUNTER — APPOINTMENT (OUTPATIENT)
Dept: INFECTIOUS DISEASE | Facility: CLINIC | Age: 64
End: 2019-09-23

## 2019-09-24 ENCOUNTER — FORM ENCOUNTER (OUTPATIENT)
Age: 64
End: 2019-09-24

## 2019-09-25 ENCOUNTER — APPOINTMENT (OUTPATIENT)
Dept: CT IMAGING | Facility: IMAGING CENTER | Age: 64
End: 2019-09-25
Payer: MEDICAID

## 2019-09-25 ENCOUNTER — OUTPATIENT (OUTPATIENT)
Dept: OUTPATIENT SERVICES | Facility: HOSPITAL | Age: 64
LOS: 1 days | End: 2019-09-25
Payer: MEDICAID

## 2019-09-25 VITALS
DIASTOLIC BLOOD PRESSURE: 84 MMHG | SYSTOLIC BLOOD PRESSURE: 127 MMHG | WEIGHT: 130 LBS | RESPIRATION RATE: 18 BRPM | OXYGEN SATURATION: 96 % | BODY MASS INDEX: 21.63 KG/M2 | HEART RATE: 71 BPM

## 2019-09-25 DIAGNOSIS — Z98.890 OTHER SPECIFIED POSTPROCEDURAL STATES: Chronic | ICD-10-CM

## 2019-09-25 DIAGNOSIS — C34.90 MALIGNANT NEOPLASM OF UNSPECIFIED PART OF UNSPECIFIED BRONCHUS OR LUNG: ICD-10-CM

## 2019-09-25 DIAGNOSIS — C79.31 SECONDARY MALIGNANT NEOPLASM OF BRAIN: ICD-10-CM

## 2019-09-25 PROCEDURE — 77427 RADIATION TX MANAGEMENT X5: CPT

## 2019-09-25 PROCEDURE — 71250 CT THORAX DX C-: CPT

## 2019-09-25 PROCEDURE — 71250 CT THORAX DX C-: CPT | Mod: 26

## 2019-09-25 NOTE — PHYSICAL EXAM
[Heart Sounds] : normal S1 and S2 [Normal] : normoactive bowel sounds, soft and nontender, no hepatosplenomegaly or masses appreciated [Bowel Sounds] : normal bowel sounds [Oriented To Time, Place, And Person] : oriented to person, place, and time [de-identified] : weak appearing male, in a wheelchair at the time of visit.  [de-identified] : bilateral LE weakness. Bilatearl feet wrapped and bandaged. 5+ strength bilateral UE, 3+ strength bilateral LE [de-identified] : cranial nerves 2-12 grossly intact aside from dark vision to right eye

## 2019-09-25 NOTE — HISTORY OF PRESENT ILLNESS
[FreeTextEntry1] :  MD Moura completed GK to the the left frontal region on 5/16/19. He presents for a f/u visit with new MRI. \par \par ONCOLOGY HISTORY\par 63-year-old man who came [place] in [place] and had reported a one year history of cough and had mild associated chest discomfort. Chest x-ray 11/2018 showed left upper lobe masslike opacity measuring 2.7 cm as well as enlarged left hilum. There were pleural thickening adjacent to the masslike opacity. PET scan showed hypermetabolic spiculated left upper lobe lung mass with gross involvement of the mid upper left pleura associated loculated effusion. Suspicious hypermetabolic regional node in the left hilum and left anterior mediastinum and, lytic lesions in the axial skeleton suspicious for multifocal disease. \par \par 12/30/18 by Dr. Hawthorne : EBUS of the left upper lobe lesion showed small cell carcinoma. BAL of left upper lobe negative for malignancy. He started etoposide/carboplatin on 12/31/18, completed 4 cycles on 3/8/19.\par \par March 2019 CT scan showed partial response in the largest spiculated nodule in the left upper lobe, improved left pleural thickening and near resolution of the pleural-based nodules. There were improvement in mediastinal and hilar adenopathy. There are new lytic and sclerotic lesions in the bones (left sacrum, pubic symphysis, Lumbar vertebral body, posterior right sixth rib, posterior lateral left 4th rib. Sclerotic foci in T2 and L4 vertebral body), He started the Nivolumab on 4/2019\par \par MRI 4/29/19 showed new tiny enhancing lesion in the left frontal subcortical region measuring 2.1 mm.  At the time of initial consult he had no symptoms from this metastasis. He was treated with GK. \par \par On 9/8/19 Mri brain showed Interval appearance of approximately 11 new enhancing parenchymal nodules with surrounding edema, most within the left frontal and temporal lobes, and one within the right anterior parietal lobe. \par \par The largest is in the left posterolateral temporal lobe, measuring 1.2 x 1.0 cm. The second largest is in the left anterolateral frontal lobe, measuring 0.9 x 0.9 cm. The remaining foci are subcentimeter in size. No abnormal leptomeningeal enhancement, although some of the new lesions border sulci. Previously seen enhancing left lateral frontal focus has resolved. \par \par 9/10/19- Mr. Moura presents today for follow up. He completed 2000 cgy of radiation to the left frontal region on 5/16/19. \par He began gemzar treatment in 7/2019 after CT scans showed new QING satellite nodules with increased left mediastinal and left hilar adenopathy, consistent with disease progression. Had a left foot transetatarsal amputation. Still on IV antibiotics. Chemo cannot be resumed at this time. \par \par Today he is feeling generalized weakness. He is unable to walk due to pain in his feet from the MRSA. \par He denies headaches, nausea, vomiting, focal weakness. Notes numbness in his thumb and forefinger bilaterally. He has almost no vision in the right eye for almost 1 year. He complains of constipation. Appears weak at the time of visit .\par \par He is currently at [facility]. \par \par 9/25/19- \par Mr. Moura presents today for on treatment visit. He has completed 1500/3000 cgy of radiation to the whole brain. He is feeling well. Not currently on any steroids. \par Notes nausea when he sees food, but not having any headaches. No increased focal weakness, numbness, tingling

## 2019-09-25 NOTE — REVIEW OF SYSTEMS
[Fatigue] : fatigue [Negative] : Psychiatric [Chills] : no chills [Night Sweats] : no night sweats [Dysphagia] : no dysphagia [Odynophagia] : no odynophagia [Fainting] : no fainting [Confused] : no confusion [Dizziness] : no dizziness [Difficulty Walking] : no difficulty walking [FreeTextEntry3] : right sided vision loss for the past year [FreeTextEntry9] : bilateral LE weakness, with bandaged bilateral LE

## 2019-09-30 ENCOUNTER — OUTPATIENT (OUTPATIENT)
Dept: OUTPATIENT SERVICES | Facility: HOSPITAL | Age: 64
LOS: 1 days | End: 2019-09-30
Payer: MEDICAID

## 2019-09-30 ENCOUNTER — APPOINTMENT (OUTPATIENT)
Dept: INFECTIOUS DISEASE | Facility: CLINIC | Age: 64
End: 2019-09-30
Payer: MEDICAID

## 2019-09-30 ENCOUNTER — OUTPATIENT (OUTPATIENT)
Dept: OUTPATIENT SERVICES | Facility: HOSPITAL | Age: 64
LOS: 1 days | Discharge: HOME | End: 2019-09-30
Payer: MEDICAID

## 2019-09-30 VITALS
DIASTOLIC BLOOD PRESSURE: 53 MMHG | SYSTOLIC BLOOD PRESSURE: 99 MMHG | OXYGEN SATURATION: 99 % | HEART RATE: 75 BPM | WEIGHT: 138 LBS | BODY MASS INDEX: 22.99 KG/M2 | TEMPERATURE: 98.9 F | HEIGHT: 65 IN

## 2019-09-30 VITALS
RESPIRATION RATE: 18 BRPM | SYSTOLIC BLOOD PRESSURE: 107 MMHG | OXYGEN SATURATION: 96 % | HEART RATE: 61 BPM | DIASTOLIC BLOOD PRESSURE: 71 MMHG

## 2019-09-30 DIAGNOSIS — Z85.89 PERSONAL HISTORY OF MALIGNANT NEOPLASM OF OTHER ORGANS AND SYSTEMS: ICD-10-CM

## 2019-09-30 DIAGNOSIS — Z86.39 PERSONAL HISTORY OF OTHER ENDOCRINE, NUTRITIONAL AND METABOLIC DISEASE: ICD-10-CM

## 2019-09-30 DIAGNOSIS — B97.89 OTHER VIRAL AGENTS AS THE CAUSE OF DISEASES CLASSIFIED ELSEWHERE: ICD-10-CM

## 2019-09-30 DIAGNOSIS — C34.90 MALIGNANT NEOPLASM OF UNSPECIFIED PART OF UNSPECIFIED BRONCHUS OR LUNG: ICD-10-CM

## 2019-09-30 DIAGNOSIS — Z98.890 OTHER SPECIFIED POSTPROCEDURAL STATES: Chronic | ICD-10-CM

## 2019-09-30 DIAGNOSIS — L08.9 TYPE 2 DIABETES MELLITUS WITH OTHER SKIN COMPLICATIONS: ICD-10-CM

## 2019-09-30 DIAGNOSIS — E11.628 TYPE 2 DIABETES MELLITUS WITH OTHER SKIN COMPLICATIONS: ICD-10-CM

## 2019-09-30 PROCEDURE — 99215 OFFICE O/P EST HI 40 MIN: CPT

## 2019-09-30 PROCEDURE — G0463: CPT

## 2019-09-30 NOTE — REVIEW OF SYSTEMS
[Fever] : no fever [Chills] : no chills [Eye Pain] : no eye pain [Red Eyes] : eyes not red [Loss Of Hearing] : no hearing loss [Earache] : no earache [Nasal Discharge] : no nasal discharge [Chest Pain] : no chest pain [Palpitations] : no palpitations [Shortness Of Breath] : no shortness of breath [Wheezing] : no wheezing [Abdominal Pain] : no abdominal pain [Vomiting] : no vomiting [Diarrhea] : no diarrhea [Dysuria] : no dysuria [Joint Swelling] : no joint swelling [Skin Lesions] : no skin lesions [Convulsions] : no convulsions [Easy Bleeding] : no tendency for easy bleeding [Easy Bruising] : no tendency for easy bruising [Negative] : Heme/Lymph [FreeTextEntry2] : poor appetitie [FreeTextEntry9] : L foot wound

## 2019-09-30 NOTE — ASSESSMENT
[FreeTextEntry1] : 63 M with DM, HTN, HLD, CKD, small cell lung cancer, metastatic to bone, and brain, recent hospitalization s/p L TMA revision for osteo, the clean bone also had MRSA\par pt was discharged with vanco to complete a 6 week course\par now here for follow up after finishing the course\par no fever or chills, exam still with an active wound on the revision site\par \par diabetic foot infection, osteomyelitis and MRSA infection s/p L TMA revision with residual osteo and MRSA in clean bone, complete a 6 week course, still with a wound at the incision\par \par * will check CBC, CMP, ESR and CRP\par * f/u with podiatry, might need more debridement

## 2019-09-30 NOTE — HISTORY OF PRESENT ILLNESS
[FreeTextEntry1] : 63 M with DM, HTN, HLD, CKD, small cell lung cancer, metastatic to bone, and brain, recent hospitalization s/p L TMA revision for osteo, the clean bone also had MRSA pt was discharged with vanco to complete a 6 week course now here for follow up after finishing the course no fever or chills, exam still with an active wound on the revision site

## 2019-09-30 NOTE — PHYSICAL EXAM
[Oropharynx] : the oropharynx was normal with no thrush [Neck Appearance] : the appearance of the neck was normal [Auscultation Breath Sounds / Voice Sounds] : lungs were clear to auscultation bilaterally [Heart Sounds] : normal S1 and S2 [Edema] : there was no peripheral edema [Bowel Sounds] : normal bowel sounds [Abdomen Soft] : soft [Abdomen Tenderness] : non-tender [No Palpable Adenopathy] : no palpable adenopathy [Costovertebral Angle Tenderness] : no CVA tenderness [FreeTextEntry1] : L TMA site with a 1-2 cm ulcer some sloughing [No Focal Deficits] : no focal deficits [] : no rash [Oriented To Time, Place, And Person] : oriented to person, place, and time [Affect] : the affect was normal

## 2019-10-01 LAB
ALBUMIN SERPL ELPH-MCNC: 3.8 G/DL
ALP BLD-CCNC: 109 U/L
ALT SERPL-CCNC: 15 U/L
ANION GAP SERPL CALC-SCNC: 12 MMOL/L
AST SERPL-CCNC: 15 U/L
BASOPHILS # BLD AUTO: 0.03 K/UL
BASOPHILS NFR BLD AUTO: 0.4 %
BILIRUB SERPL-MCNC: 0.4 MG/DL
BUN SERPL-MCNC: 19 MG/DL
CALCIUM SERPL-MCNC: 9.5 MG/DL
CHLORIDE SERPL-SCNC: 98 MMOL/L
CO2 SERPL-SCNC: 28 MMOL/L
CREAT SERPL-MCNC: 1.38 MG/DL
CRP SERPL-MCNC: 0.48 MG/DL
EOSINOPHIL # BLD AUTO: 0.77 K/UL
EOSINOPHIL NFR BLD AUTO: 11 %
ERYTHROCYTE [SEDIMENTATION RATE] IN BLOOD BY WESTERGREN METHOD: 26 MM/HR
GLUCOSE SERPL-MCNC: 246 MG/DL
HCT VFR BLD CALC: 34.7 %
HGB BLD-MCNC: 10.9 G/DL
IMM GRANULOCYTES NFR BLD AUTO: 0.4 %
LYMPHOCYTES # BLD AUTO: 0.98 K/UL
LYMPHOCYTES NFR BLD AUTO: 14 %
MAN DIFF?: NORMAL
MCHC RBC-ENTMCNC: 30 PG
MCHC RBC-ENTMCNC: 31.4 GM/DL
MCV RBC AUTO: 95.6 FL
MONOCYTES # BLD AUTO: 0.71 K/UL
MONOCYTES NFR BLD AUTO: 10.1 %
NEUTROPHILS # BLD AUTO: 4.5 K/UL
NEUTROPHILS NFR BLD AUTO: 64.1 %
PLATELET # BLD AUTO: 215 K/UL
POTASSIUM SERPL-SCNC: 4.6 MMOL/L
PROT SERPL-MCNC: 6.8 G/DL
RBC # BLD: 3.63 M/UL
RBC # FLD: 14.4 %
SODIUM SERPL-SCNC: 138 MMOL/L
WBC # FLD AUTO: 7.02 K/UL

## 2019-10-02 ENCOUNTER — RESULT REVIEW (OUTPATIENT)
Age: 64
End: 2019-10-02

## 2019-10-02 ENCOUNTER — APPOINTMENT (OUTPATIENT)
Dept: INFUSION THERAPY | Facility: HOSPITAL | Age: 64
End: 2019-10-02

## 2019-10-02 ENCOUNTER — LABORATORY RESULT (OUTPATIENT)
Age: 64
End: 2019-10-02

## 2019-10-02 LAB
BASOPHILS # BLD AUTO: 0 K/UL — SIGNIFICANT CHANGE UP (ref 0–0.2)
BASOPHILS NFR BLD AUTO: 0.2 % — SIGNIFICANT CHANGE UP (ref 0–2)
EOSINOPHIL # BLD AUTO: 0.8 K/UL — HIGH (ref 0–0.5)
EOSINOPHIL NFR BLD AUTO: 9.5 % — HIGH (ref 0–6)
HCT VFR BLD CALC: 32.4 % — LOW (ref 39–50)
HGB BLD-MCNC: 10.8 G/DL — LOW (ref 13–17)
LYMPHOCYTES # BLD AUTO: 1.5 K/UL — SIGNIFICANT CHANGE UP (ref 1–3.3)
LYMPHOCYTES # BLD AUTO: 18.2 % — SIGNIFICANT CHANGE UP (ref 13–44)
MCHC RBC-ENTMCNC: 30.9 PG — SIGNIFICANT CHANGE UP (ref 27–34)
MCHC RBC-ENTMCNC: 33.4 G/DL — SIGNIFICANT CHANGE UP (ref 32–36)
MCV RBC AUTO: 92.6 FL — SIGNIFICANT CHANGE UP (ref 80–100)
MONOCYTES # BLD AUTO: 0.8 K/UL — SIGNIFICANT CHANGE UP (ref 0–0.9)
MONOCYTES NFR BLD AUTO: 9.1 % — SIGNIFICANT CHANGE UP (ref 2–14)
NEUTROPHILS # BLD AUTO: 5.3 K/UL — SIGNIFICANT CHANGE UP (ref 1.8–7.4)
NEUTROPHILS NFR BLD AUTO: 63 % — SIGNIFICANT CHANGE UP (ref 43–77)
PLATELET # BLD AUTO: 194 K/UL — SIGNIFICANT CHANGE UP (ref 150–400)
RBC # BLD: 3.5 M/UL — LOW (ref 4.2–5.8)
RBC # FLD: 14.2 % — SIGNIFICANT CHANGE UP (ref 10.3–14.5)
WBC # BLD: 8.4 K/UL — SIGNIFICANT CHANGE UP (ref 3.8–10.5)
WBC # FLD AUTO: 8.4 K/UL — SIGNIFICANT CHANGE UP (ref 3.8–10.5)

## 2019-10-02 PROCEDURE — 77427 RADIATION TX MANAGEMENT X5: CPT

## 2019-10-03 DIAGNOSIS — R11.2 NAUSEA WITH VOMITING, UNSPECIFIED: ICD-10-CM

## 2019-10-03 DIAGNOSIS — Z51.11 ENCOUNTER FOR ANTINEOPLASTIC CHEMOTHERAPY: ICD-10-CM

## 2019-10-03 NOTE — REVIEW OF SYSTEMS
[Fatigue] : fatigue [Negative] : Psychiatric [Chills] : no chills [Night Sweats] : no night sweats [Dysphagia] : no dysphagia [Odynophagia] : no odynophagia [Confused] : no confusion [Dizziness] : no dizziness [Fainting] : no fainting [Difficulty Walking] : no difficulty walking [FreeTextEntry3] : right sided vision loss for the past year [FreeTextEntry9] : bilateral LE weakness, with bandaged bilateral LE [de-identified] : numbness to left hand

## 2019-10-03 NOTE — PHYSICAL EXAM
[Normal] : normoactive bowel sounds, soft and nontender, no hepatosplenomegaly or masses appreciated [Bowel Sounds] : normal bowel sounds [Oriented To Time, Place, And Person] : oriented to person, place, and time [de-identified] : weak appearing male, in a wheelchair at the time of visit.  [de-identified] : bilateral LE weakness. Bilatearl feet wrapped and bandaged. 5+ strength bilateral UE, 3+ strength bilateral LE [de-identified] : cranial nerves 2-12 grossly intact aside from dark vision to right eye

## 2019-10-03 NOTE — DISEASE MANAGEMENT
[Clinical] : TNM Stage: c [IV] : IV [TTNM] : 3 [NTNM] : 2 [de-identified] : 9913 [MTNM] : 1c [de-identified] : whole brain radiation [de-identified] : 3000 cgy

## 2019-10-03 NOTE — HISTORY OF PRESENT ILLNESS
[FreeTextEntry1] : Mr. MANZANARES Moura completed GK to the the left frontal region on 5/16/19.\par \par ONCOLOGY HISTORY\par 63-year-old man who came to the [place] in [place] 2018 and had reported a one year history of cough and had mild associated chest discomfort. Chest x-ray 11/2018 showed left upper lobe masslike opacity measuring 2.7 cm as well as enlarged left hilum. There were pleural thickening adjacent to the masslike opacity. PET scan showed hypermetabolic spiculated left upper lobe lung mass with gross involvement of the mid upper left pleura associated loculated effusion. Suspicious hypermetabolic regional node in the left hilum and left anterior mediastinum and, lytic lesions in the axial skeleton suspicious for multifocal disease. \par \par 12/30/18 by Dr. Hawthorne : EBUS of the left upper lobe lesion showed small cell carcinoma. BAL of left upper lobe negative for malignancy. He started etoposide/carboplatin on 12/31/18, completed 4 cycles on 3/8/19.\par \par March 2019 CT scan showed partial response in the largest spiculated nodule in the left upper lobe, improved left pleural thickening and near resolution of the pleural-based nodules. There were improvement in mediastinal and hilar adenopathy. There are new lytic and sclerotic lesions in the bones (left sacrum, pubic symphysis, Lumbar vertebral body, posterior right sixth rib, posterior lateral left 4th rib. Sclerotic foci in T2 and L4 vertebral body), He started the Nivolumab on 4/2019\par \par MRI 4/29/19 showed new tiny enhancing lesion in the left frontal subcortical region measuring 2.1 mm.  At the time of initial consult he had no symptoms from this metastasis. He was treated with GK. \par \par On 9/8/19 Mri brain showed Interval appearance of approximately 11 new enhancing parenchymal nodules with surrounding edema, most within the left frontal and temporal lobes, and one within the right anterior parietal lobe. \par \par The largest is in the left posterolateral temporal lobe, measuring 1.2 x 1.0 cm. The second largest is in the left anterolateral frontal lobe, measuring 0.9 x 0.9 cm. The remaining foci are subcentimeter in size. No abnormal leptomeningeal enhancement, although some of the new lesions border sulci. Previously seen enhancing left lateral frontal focus has resolved. \par \par 9/10/19- Mr. Moura presents today for follow up. He completed 2000 cgy of radiation to the left frontal region on 5/16/19. \par He began gemzar treatment in 7/2019 after CT scans showed new QING satellite nodules with increased left mediastinal and left hilar adenopathy, consistent with disease progression. Had a left foot transetatarsal amputation. Still on IV antibiotics. Chemo cannot be resumed at this time. \par \par Today he is feeling generalized weakness. He is unable to walk due to pain in his feet from the MRSA. \par He denies headaches, nausea, vomiting, focal weakness. Notes numbness in his thumb and forefinger bilaterally. He has almost no vision in the right eye for almost 1 year. He complains of constipation. Appears weak at the time of visit .\par \par He is currently at [facility]. \par \par 9/25/19- \par Mr. Moura presents today for on treatment visit. He has completed 1500/3000 cgy of radiation to the whole brain. He is feeling well. Not currently on any steroids. \par Notes nausea when he sees food, but not having any headaches. No increased focal weakness, numbness, tingling\par \par 9/30/19- Mr. Moura presents today for on treatment visit. He has completed 2400/3000 cgy of radiation to the whole brain. Feeling well. Not currently on any steroids. Slight numbness to left hand, notes this for the last 2 weeks. Denies nausea or vomiting this week. Notes itchiness to head. Was discharged from rehab today.

## 2019-10-09 ENCOUNTER — RESULT REVIEW (OUTPATIENT)
Age: 64
End: 2019-10-09

## 2019-10-09 ENCOUNTER — LABORATORY RESULT (OUTPATIENT)
Age: 64
End: 2019-10-09

## 2019-10-09 ENCOUNTER — APPOINTMENT (OUTPATIENT)
Dept: INFUSION THERAPY | Facility: HOSPITAL | Age: 64
End: 2019-10-09

## 2019-10-09 ENCOUNTER — OTHER (OUTPATIENT)
Age: 64
End: 2019-10-09

## 2019-10-09 ENCOUNTER — APPOINTMENT (OUTPATIENT)
Dept: HEMATOLOGY ONCOLOGY | Facility: CLINIC | Age: 64
End: 2019-10-09

## 2019-10-09 DIAGNOSIS — A49.02 METHICILLIN RESISTANT STAPHYLOCOCCUS AUREUS INFECTION, UNSPECIFIED SITE: ICD-10-CM

## 2019-10-09 DIAGNOSIS — M86.9 OSTEOMYELITIS, UNSPECIFIED: ICD-10-CM

## 2019-10-09 DIAGNOSIS — E11.628 TYPE 2 DIABETES MELLITUS WITH OTHER SKIN COMPLICATIONS: ICD-10-CM

## 2019-10-09 DIAGNOSIS — Z86.39 PERSONAL HISTORY OF OTHER ENDOCRINE, NUTRITIONAL AND METABOLIC DISEASE: ICD-10-CM

## 2019-10-09 LAB
BASOPHILS # BLD AUTO: 0 K/UL — SIGNIFICANT CHANGE UP (ref 0–0.2)
BASOPHILS NFR BLD AUTO: 0.3 % — SIGNIFICANT CHANGE UP (ref 0–2)
EOSINOPHIL # BLD AUTO: 0.1 K/UL — SIGNIFICANT CHANGE UP (ref 0–0.5)
EOSINOPHIL NFR BLD AUTO: 1.4 % — SIGNIFICANT CHANGE UP (ref 0–6)
HCT VFR BLD CALC: 33.9 % — LOW (ref 39–50)
HGB BLD-MCNC: 11.3 G/DL — LOW (ref 13–17)
LYMPHOCYTES # BLD AUTO: 0.9 K/UL — LOW (ref 1–3.3)
LYMPHOCYTES # BLD AUTO: 15.5 % — SIGNIFICANT CHANGE UP (ref 13–44)
MCHC RBC-ENTMCNC: 31.2 PG — SIGNIFICANT CHANGE UP (ref 27–34)
MCHC RBC-ENTMCNC: 33.4 G/DL — SIGNIFICANT CHANGE UP (ref 32–36)
MCV RBC AUTO: 93.3 FL — SIGNIFICANT CHANGE UP (ref 80–100)
MONOCYTES # BLD AUTO: 0.3 K/UL — SIGNIFICANT CHANGE UP (ref 0–0.9)
MONOCYTES NFR BLD AUTO: 4.6 % — SIGNIFICANT CHANGE UP (ref 2–14)
NEUTROPHILS # BLD AUTO: 4.5 K/UL — SIGNIFICANT CHANGE UP (ref 1.8–7.4)
NEUTROPHILS NFR BLD AUTO: 78.2 % — HIGH (ref 43–77)
PLATELET # BLD AUTO: 123 K/UL — LOW (ref 150–400)
POTASSIUM SERPL-SCNC: 5.3 MMOL/L
RBC # BLD: 3.63 M/UL — LOW (ref 4.2–5.8)
RBC # FLD: 14.8 % — HIGH (ref 10.3–14.5)
WBC # BLD: 5.8 K/UL — SIGNIFICANT CHANGE UP (ref 3.8–10.5)
WBC # FLD AUTO: 5.8 K/UL — SIGNIFICANT CHANGE UP (ref 3.8–10.5)

## 2019-10-16 ENCOUNTER — APPOINTMENT (OUTPATIENT)
Dept: INFUSION THERAPY | Facility: HOSPITAL | Age: 64
End: 2019-10-16

## 2019-10-16 ENCOUNTER — RESULT REVIEW (OUTPATIENT)
Age: 64
End: 2019-10-16

## 2019-10-16 ENCOUNTER — APPOINTMENT (OUTPATIENT)
Dept: HEMATOLOGY ONCOLOGY | Facility: CLINIC | Age: 64
End: 2019-10-16
Payer: MEDICAID

## 2019-10-16 VITALS
TEMPERATURE: 99 F | SYSTOLIC BLOOD PRESSURE: 105 MMHG | RESPIRATION RATE: 15 BRPM | HEART RATE: 68 BPM | OXYGEN SATURATION: 100 % | DIASTOLIC BLOOD PRESSURE: 71 MMHG

## 2019-10-16 VITALS
SYSTOLIC BLOOD PRESSURE: 105 MMHG | DIASTOLIC BLOOD PRESSURE: 71 MMHG | OXYGEN SATURATION: 100 % | TEMPERATURE: 99 F | HEART RATE: 68 BPM | RESPIRATION RATE: 15 BRPM

## 2019-10-16 LAB
BASOPHILS # BLD AUTO: 0 K/UL — SIGNIFICANT CHANGE UP (ref 0–0.2)
BASOPHILS NFR BLD AUTO: 0 % — SIGNIFICANT CHANGE UP (ref 0–2)
EOSINOPHIL # BLD AUTO: 0.1 K/UL — SIGNIFICANT CHANGE UP (ref 0–0.5)
EOSINOPHIL NFR BLD AUTO: 0.9 % — SIGNIFICANT CHANGE UP (ref 0–6)
HCT VFR BLD CALC: 34 % — LOW (ref 39–50)
HGB BLD-MCNC: 11.1 G/DL — LOW (ref 13–17)
LYMPHOCYTES # BLD AUTO: 1 K/UL — SIGNIFICANT CHANGE UP (ref 1–3.3)
LYMPHOCYTES # BLD AUTO: 14.8 % — SIGNIFICANT CHANGE UP (ref 13–44)
MCHC RBC-ENTMCNC: 30.1 PG — SIGNIFICANT CHANGE UP (ref 27–34)
MCHC RBC-ENTMCNC: 32.7 G/DL — SIGNIFICANT CHANGE UP (ref 32–36)
MCV RBC AUTO: 92.2 FL — SIGNIFICANT CHANGE UP (ref 80–100)
MONOCYTES # BLD AUTO: 0.2 K/UL — SIGNIFICANT CHANGE UP (ref 0–0.9)
MONOCYTES NFR BLD AUTO: 2.9 % — SIGNIFICANT CHANGE UP (ref 2–14)
NEUTROPHILS # BLD AUTO: 5.2 K/UL — SIGNIFICANT CHANGE UP (ref 1.8–7.4)
NEUTROPHILS NFR BLD AUTO: 81.4 % — HIGH (ref 43–77)
PLATELET # BLD AUTO: 38 K/UL — LOW (ref 150–400)
RBC # BLD: 3.69 M/UL — LOW (ref 4.2–5.8)
RBC # FLD: 15.4 % — HIGH (ref 10.3–14.5)
WBC # BLD: 6.4 K/UL — SIGNIFICANT CHANGE UP (ref 3.8–10.5)
WBC # FLD AUTO: 6.4 K/UL — SIGNIFICANT CHANGE UP (ref 3.8–10.5)

## 2019-10-16 PROCEDURE — 99215 OFFICE O/P EST HI 40 MIN: CPT

## 2019-10-16 NOTE — RESULTS/DATA
[FreeTextEntry1] : 9/2019: CT chest: IMPRESSION: \par 1. Since 7/9/2019, the left upper lobe nodule thought to represent malignancy, has increased in \par size. \par 2. Since 7/9/2019, the periaortic lymph node, that is malignant, has increased in size.

## 2019-10-16 NOTE — HISTORY OF PRESENT ILLNESS
[Disease: _____________________] : Disease: [unfilled] [T: ___] : T[unfilled] [N: ___] : N[unfilled] [M: ___] : M[unfilled] [AJCC Stage: ____] : AJCC Stage: [unfilled] [de-identified] : Patient came to the [place] from [facility] 9/2018. He reported an approximately one-year history of a cough. Recently had had some associated chest discomfort. He saw his primary care physician for evaluation, and chest x-ray 11/2018 showed a left upper lobe masslike opacity measuring 3.7 cm in maximum dimension. Also enlargement of the left hilum. Pleural thickening adjacent to the masslike opacity. PET/CT scan showed a hypermetabolic spiculated left upper lobe lung mass with gross involvement of the mid to upper left pleura with associated loculated effusions. Suspicious hypermetabolic regional megan disease in the left hilum and left anterior mediastinum. Hypermetabolic lytic foci in the axial skeleton suspicious for multifocal osteolytic metastatic disease. A hypermetabolic nodule noted in the left lobe of the thyroid, nonspecific.\par FB, EBUS biopsy 12/3/18 performed (Dr. Looney). Left upper lobe endobronchial lesion/mass consistent with small cell carcinoma. BAL of left upper lobe negative for malignant cells. Patient began Etoposide/Carboplatin 12/31/18, completing 4th cycle 3/8/19.\par 3/2019-CT scan of the chest/abdomen/pelvis (addended report)-when compared to 11/2018 PET/CT scan showed the largest spiculated nodule of the left upper lobe of the lung improved, prominent left pleural thickening associated with the left upper lobe mass, markedly improved, other pleural-based nodules near completely resolved. Interval resolution of pleural effusions. Marked interval improvement in mediastinal and hilar adenopathy. New lytic and sclerotic lesions of bone. \par 4/2019-brain MRI showed a new tiny enhancing lesion in the left frontal subcortical region compatible with metastasis. Patient underwent gamma knife radiosurgery, completing 5/2019. 4/2019-Began Nivolumab.\par 7/2019-CT scan chest/abdomen/pelvis-new left upper lobe satellite nodules with increased left mediastinal and left hilar adenopathy, consistent with disease progression. Interval pathologic fracture of right posterior sixth rib, otherwise stable osseous metastases. Began Gemzar.\par 9/2019-MRI of the brain showed multiple new metastatic lesions. Patient proceeding with whole brain radiation therapy. [de-identified] : small cell carcinoma [de-identified] : Home from rehab center now. Limiting weight-bearing secondary to continued foot healing. In WC most of time still.\par Completed WBRT. Has no H/A's. \par Denies cough or SOB. No hemoptysis or other bleeding. No fevers. No current complaints of bone pain.\par Accompanied by son-in-law (Lin's ) and patient's wife.\par \par \par \par \par

## 2019-10-16 NOTE — PHYSICAL EXAM
[Normal] : affect appropriate [de-identified] : non-toxic appearing [de-identified] : decreased BS bases [de-identified] : chronic venous stasis changes RLE; no right calf tenderness; LLE bandaging in place [de-identified] : soft, NT without palpable hepatosplenomegaly [de-identified] : s/p TMA left foot-bandaging in place [de-identified] : papery thin; dry [de-identified] : cooperative; generally weak

## 2019-10-16 NOTE — REVIEW OF SYSTEMS
[Negative] : Allergic/Immunologic [Fever] : no fever [Chest Pain] : no chest pain [Joint Pain] : no joint pain [Fainting] : no fainting [Proptosis] : no proptosis

## 2019-10-16 NOTE — ASSESSMENT
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 Treatment course complicated/interrupted by MRSA left foot infection/surgery. New baseline CAT scan of the chest discussed. Gemzar chemotherapy rescheduled, pending increase in platelet count.\par Interval followup scans will again be planned, for ongoing management decisions. \par Patient's/family's questions have been answered to their apparent satisfaction at this time.

## 2019-10-17 ENCOUNTER — OUTPATIENT (OUTPATIENT)
Dept: OUTPATIENT SERVICES | Facility: HOSPITAL | Age: 64
LOS: 1 days | End: 2019-10-17
Payer: MEDICAID

## 2019-10-17 ENCOUNTER — APPOINTMENT (OUTPATIENT)
Dept: INFECTIOUS DISEASE | Facility: CLINIC | Age: 64
End: 2019-10-17
Payer: MEDICAID

## 2019-10-17 VITALS
WEIGHT: 128 LBS | DIASTOLIC BLOOD PRESSURE: 70 MMHG | BODY MASS INDEX: 21.3 KG/M2 | HEART RATE: 70 BPM | TEMPERATURE: 97.1 F | OXYGEN SATURATION: 99 % | SYSTOLIC BLOOD PRESSURE: 112 MMHG | RESPIRATION RATE: 20 BRPM

## 2019-10-17 DIAGNOSIS — A49.02 METHICILLIN RESISTANT STAPHYLOCOCCUS AUREUS INFECTION, UNSPECIFIED SITE: ICD-10-CM

## 2019-10-17 DIAGNOSIS — B97.89 OTHER VIRAL AGENTS AS THE CAUSE OF DISEASES CLASSIFIED ELSEWHERE: ICD-10-CM

## 2019-10-17 DIAGNOSIS — M86.9 OSTEOMYELITIS, UNSPECIFIED: ICD-10-CM

## 2019-10-17 DIAGNOSIS — Z98.890 OTHER SPECIFIED POSTPROCEDURAL STATES: Chronic | ICD-10-CM

## 2019-10-17 PROCEDURE — 99214 OFFICE O/P EST MOD 30 MIN: CPT

## 2019-10-17 PROCEDURE — G0463: CPT

## 2019-10-21 ENCOUNTER — APPOINTMENT (OUTPATIENT)
Dept: INFECTIOUS DISEASE | Facility: CLINIC | Age: 64
End: 2019-10-21

## 2019-10-21 PROBLEM — A49.02 MRSA INFECTION: Status: ACTIVE | Noted: 2019-09-30

## 2019-10-21 PROBLEM — M86.9 FOOT OSTEOMYELITIS, LEFT: Status: ACTIVE | Noted: 2019-09-30

## 2019-10-21 NOTE — HISTORY OF PRESENT ILLNESS
[FreeTextEntry1] : 63 M with DM, HTN, HLD, CKD, small cell lung cancer, metastatic to bone, and brain, recent hospitalization s/p L TMA revision for osteo, the clean bone also had MRSA, discharged on course of Vanco. Now s/p course prolonged Vanco. Here for further follow up with small wound at TMA site. No fevers, no chills. No new complaints. unchanged. Off abx. Following with podiatry.

## 2019-10-21 NOTE — REVIEW OF SYSTEMS
[Negative] : Heme/Lymph [Fever] : no fever [Chills] : no chills [Nasal Discharge] : no nasal discharge [Diarrhea] : no diarrhea [Joint Swelling] : no joint swelling [Skin Lesions] : no skin lesions [Convulsions] : no convulsions [Easy Bleeding] : no tendency for easy bleeding [Easy Bruising] : no tendency for easy bruising [FreeTextEntry9] : L foot wound

## 2019-10-21 NOTE — DATA REVIEWED
[FreeTextEntry1] : 8/5 MR:\par \par IMPRESSION:\par Soft tissue ulceration over lateral plantar aspect of the TMA stump with \par evidence for osteomyelitis involving the underlying 4th and 5th \par metatarsal margins. The 4th and 5th metatarsal stumps are also noted to \par be longer relative to the remaining metatarsal stump remnants suggesting \par an aggressive edge.\par \par Less specific marrow signal alteration in the 1st and 3rd metatarsal \par stump remnant with grossly intact appearing overlying soft tissue \par coverage. This may be related to ischemic change or reactive osteitis \par however early or low-grade osteomyelitis in these regions cannot be \par entirely excluded.\par \par No discrete drainable abscess collections.\par \par Lobular marginated subcortical marrow signal alteration in plantar aspect \par of the calcaneus adjacent to thickened heterogeneous plantar fascia and \par with intact overlying soft tissue coverage may reflect chronic \par enthesopathic change.\par \par Mild circumferential fluid distention of the flexor tendon sheaths may \par reflect mild degree of tenosynovitis.\par \par 8/8 Clean Bone Margin: MRSA, Staph epi

## 2019-10-21 NOTE — PHYSICAL EXAM
[General Appearance - In No Acute Distress] : in no acute distress [General Appearance - Alert] : alert [General Appearance - Well Nourished] : well nourished [General Appearance - Well-Appearing] : healthy appearing [Oropharynx] : the oropharynx was normal with no thrush [Respiration, Rhythm And Depth] : normal respiratory rhythm and effort [Exaggerated Use Of Accessory Muscles For Inspiration] : no accessory muscle use [Auscultation Breath Sounds / Voice Sounds] : lungs were clear to auscultation bilaterally [Heart Rate And Rhythm] : heart rate was normal and rhythm regular [Heart Sounds] : normal S1 and S2 [Edema] : there was no peripheral edema [Bowel Sounds] : normal bowel sounds [Abdomen Soft] : soft [Abdomen Tenderness] : non-tender [Costovertebral Angle Tenderness] : no CVA tenderness [] : no rash [No Focal Deficits] : no focal deficits [Oriented To Time, Place, And Person] : oriented to person, place, and time [Affect] : the affect was normal [FreeTextEntry1] : L TMA wound

## 2019-10-23 ENCOUNTER — RESULT REVIEW (OUTPATIENT)
Age: 64
End: 2019-10-23

## 2019-10-23 ENCOUNTER — OTHER (OUTPATIENT)
Age: 64
End: 2019-10-23

## 2019-10-23 ENCOUNTER — APPOINTMENT (OUTPATIENT)
Dept: INFUSION THERAPY | Facility: HOSPITAL | Age: 64
End: 2019-10-23

## 2019-10-23 ENCOUNTER — LABORATORY RESULT (OUTPATIENT)
Age: 64
End: 2019-10-23

## 2019-10-23 LAB
ALBUMIN SERPL ELPH-MCNC: 3.2 G/DL
ALP BLD-CCNC: 92 U/L
ALT SERPL-CCNC: 18 U/L
ANION GAP SERPL CALC-SCNC: 12 MMOL/L
AST SERPL-CCNC: 12 U/L
BASOPHILS # BLD AUTO: 0 K/UL — SIGNIFICANT CHANGE UP (ref 0–0.2)
BASOPHILS NFR BLD AUTO: 0.1 % — SIGNIFICANT CHANGE UP (ref 0–2)
BILIRUB SERPL-MCNC: 0.3 MG/DL
BUN SERPL-MCNC: 24 MG/DL
BUN SERPL-MCNC: 24 MG/DL — HIGH (ref 7–23)
CA-I BLDA-SCNC: 1.17 MMOL/L — SIGNIFICANT CHANGE UP (ref 1.12–1.3)
CALCIUM SERPL-MCNC: 8.6 MG/DL
CHLORIDE SERPL-SCNC: 100 MMOL/L
CHLORIDE SERPL-SCNC: 98 MMOL/L — SIGNIFICANT CHANGE UP (ref 96–108)
CO2 SERPL-SCNC: 26 MMOL/L
CO2 SERPL-SCNC: 28 MMOL/L — SIGNIFICANT CHANGE UP (ref 22–31)
CREAT SERPL-MCNC: 1.52 MG/DL
CREAT SERPL-MCNC: 1.6 MG/DL — HIGH (ref 0.5–1.3)
EOSINOPHIL # BLD AUTO: 0.1 K/UL — SIGNIFICANT CHANGE UP (ref 0–0.5)
EOSINOPHIL NFR BLD AUTO: 1 % — SIGNIFICANT CHANGE UP (ref 0–6)
GLUCOSE SERPL-MCNC: 215 MG/DL
GLUCOSE SERPL-MCNC: 216 MG/DL — HIGH (ref 70–99)
HCT VFR BLD CALC: 28.7 % — LOW (ref 39–50)
HGB BLD-MCNC: 9.8 G/DL — LOW (ref 13–17)
LYMPHOCYTES # BLD AUTO: 1.4 K/UL — SIGNIFICANT CHANGE UP (ref 1–3.3)
LYMPHOCYTES # BLD AUTO: 23.4 % — SIGNIFICANT CHANGE UP (ref 13–44)
MCHC RBC-ENTMCNC: 31.6 PG — SIGNIFICANT CHANGE UP (ref 27–34)
MCHC RBC-ENTMCNC: 34.1 G/DL — SIGNIFICANT CHANGE UP (ref 32–36)
MCV RBC AUTO: 92.6 FL — SIGNIFICANT CHANGE UP (ref 80–100)
MONOCYTES # BLD AUTO: 0.7 K/UL — SIGNIFICANT CHANGE UP (ref 0–0.9)
MONOCYTES NFR BLD AUTO: 12.2 % — SIGNIFICANT CHANGE UP (ref 2–14)
NEUTROPHILS # BLD AUTO: 3.7 K/UL — SIGNIFICANT CHANGE UP (ref 1.8–7.4)
NEUTROPHILS NFR BLD AUTO: 63.3 % — SIGNIFICANT CHANGE UP (ref 43–77)
PLATELET # BLD AUTO: 138 K/UL — LOW (ref 150–400)
POTASSIUM SERPL-MCNC: 4.1 MMOL/L — SIGNIFICANT CHANGE UP (ref 3.5–5.3)
POTASSIUM SERPL-SCNC: 4.1 MMOL/L — SIGNIFICANT CHANGE UP (ref 3.5–5.3)
POTASSIUM SERPL-SCNC: 4.3 MMOL/L
PROT SERPL-MCNC: 6 G/DL
RBC # BLD: 3.1 M/UL — LOW (ref 4.2–5.8)
RBC # FLD: 15.8 % — HIGH (ref 10.3–14.5)
SODIUM SERPL-SCNC: 137 MMOL/L — SIGNIFICANT CHANGE UP (ref 135–145)
SODIUM SERPL-SCNC: 138 MMOL/L
WBC # BLD: 5.8 K/UL — SIGNIFICANT CHANGE UP (ref 3.8–10.5)
WBC # FLD AUTO: 5.8 K/UL — SIGNIFICANT CHANGE UP (ref 3.8–10.5)

## 2019-10-23 PROCEDURE — 93010 ELECTROCARDIOGRAM REPORT: CPT

## 2019-10-25 ENCOUNTER — OUTPATIENT (OUTPATIENT)
Dept: OUTPATIENT SERVICES | Facility: HOSPITAL | Age: 64
LOS: 1 days | Discharge: HOME | End: 2019-10-25

## 2019-10-25 DIAGNOSIS — Z98.890 OTHER SPECIFIED POSTPROCEDURAL STATES: Chronic | ICD-10-CM

## 2019-10-25 DIAGNOSIS — C34.90 MALIGNANT NEOPLASM OF UNSPECIFIED PART OF UNSPECIFIED BRONCHUS OR LUNG: ICD-10-CM

## 2019-10-30 ENCOUNTER — APPOINTMENT (OUTPATIENT)
Dept: RADIATION ONCOLOGY | Facility: CLINIC | Age: 64
End: 2019-10-30
Payer: MEDICAID

## 2019-10-30 VITALS
HEART RATE: 80 BPM | TEMPERATURE: 98.24 F | HEIGHT: 65 IN | OXYGEN SATURATION: 98 % | RESPIRATION RATE: 20 BRPM | SYSTOLIC BLOOD PRESSURE: 130 MMHG | BODY MASS INDEX: 20.73 KG/M2 | WEIGHT: 124.45 LBS | DIASTOLIC BLOOD PRESSURE: 84 MMHG

## 2019-10-30 DIAGNOSIS — L58.9 RADIODERMATITIS, UNSPECIFIED: ICD-10-CM

## 2019-10-30 PROCEDURE — 99024 POSTOP FOLLOW-UP VISIT: CPT | Mod: GC

## 2019-10-30 RX ORDER — SILVER SULFADIAZINE 10 MG/G
1 CREAM TOPICAL
Qty: 1 | Refills: 2 | Status: ACTIVE | COMMUNITY
Start: 2019-10-30 | End: 1900-01-01

## 2019-11-04 NOTE — REVIEW OF SYSTEMS
[Fatigue] : fatigue [Negative] : Psychiatric [Chills] : no chills [Night Sweats] : no night sweats [Dysphagia] : no dysphagia [Odynophagia] : no odynophagia [Confused] : no confusion [Dizziness] : no dizziness [Fainting] : no fainting [Difficulty Walking] : no difficulty walking [FreeTextEntry3] : no change in visoin [FreeTextEntry9] : bilateral LE weakness. Bilateral hands strength no longer compromised

## 2019-11-04 NOTE — HISTORY OF PRESENT ILLNESS
[FreeTextEntry1] : Mr. MANZANARES Martell completed GK to the the left frontal region on 5/16/19. He additionally completed whole brain radiation for a total of 3000 cgy of radiation from 9/19/19-10/2/19 over 10 fractions. \par \par ONCOLOGY HISTORY\par 63-year-old man who came to the [place] in [place] 2018 reported a one year history of cough and mild associated chest discomfort. Chest x-ray 11/2018 showed left upper lobe masslike opacity measuring 2.7 cm as well as enlarged left hilum. There were pleural thickening adjacent to the masslike opacity. PET scan showed hypermetabolic spiculated left upper lobe lung mass with gross involvement of the mid upper left pleura associated loculated effusion. Suspicious hypermetabolic regional node in the left hilum and left anterior mediastinum and, lytic lesions in the axial skeleton suspicious for multifocal disease. \par \par 12/30/18 by Dr. Hawthorne : EBUS of the left upper lobe lesion showed small cell carcinoma. BAL of left upper lobe negative for malignancy. He started etoposide/carboplatin on 12/31/18, completed 4 cycles on 3/8/19.\par \par March 2019 CT scan showed partial response in the largest spiculated nodule in the left upper lobe, improved left pleural thickening and near resolution of the pleural-based nodules. There were improvement in mediastinal and hilar adenopathy. There are new lytic and sclerotic lesions in the bones (left sacrum, pubic symphysis, Lumbar vertebral body, posterior right sixth rib, posterior lateral left 4th rib. Sclerotic foci in T2 and L4 vertebral body), He started the Nivolumab on 4/2019\par \par MRI 4/29/19 showed new tiny enhancing lesion in the left frontal subcortical region measuring 2.1 mm.  At the time of initial consult he had no symptoms from this metastasis. He was treated with GK. \par \par On 9/8/19 Mri brain showed Interval appearance of approximately 11 new enhancing parenchymal nodules with surrounding edema, most within the left frontal and temporal lobes, and one within the right anterior parietal lobe. The largest is in the left posterolateral temporal lobe, measuring 1.2 x 1.0 cm. The second largest is in the left anterolateral frontal lobe, measuring 0.9 x 0.9 cm. The remaining foci are subcentimeter in size. No abnormal leptomeningeal enhancement, although some of the new lesions border sulci. Previously seen enhancing left lateral frontal focus has resolved. \par \par Clinically, he appeared weak.  He is unable to walk due to pain in his feet from the MRSA. He denies headaches, nausea, vomiting, focal weakness. Notes numbness in his thumb and forefinger bilaterally. He has almost no vision in the right eye for almost 1 year. He complains of constipation. Appears weak at the time of visit .\par \par He was treated with whole brain radiation therapy completed on 10/2/2019.  \par \par 10/30/19- Mr. Moura presents today for follow up. Has continued to follow with infectious disease. Started gemzar last Wednesday. Denies headaches. Notes generalized weakness. No longer with focal weakness to hand. With baseline numbness to bilateral hands. Denies headaches, nausea, vomiting. Small amount of radiation dermatitis to back of his head

## 2019-11-04 NOTE — PHYSICAL EXAM
[Heart Sounds] : normal S1 and S2 [Normal] : normoactive bowel sounds, soft and nontender, no hepatosplenomegaly or masses appreciated [Bowel Sounds] : normal bowel sounds [Oriented To Time, Place, And Person] : oriented to person, place, and time [de-identified] : weak appearing male, in a wheelchair at the time of visit.  [de-identified] : bilateral LE weakness. Bilatearl feet wrapped and bandaged. 5+ strength bilateral UE, 3+ strength bilateral LE [de-identified] : radiation dermatitis apparent to back of head near top of neck [de-identified] : cranial nerves 2-12 grossly intact aside from dark vision to right eye

## 2019-11-06 ENCOUNTER — APPOINTMENT (OUTPATIENT)
Dept: INFUSION THERAPY | Facility: HOSPITAL | Age: 64
End: 2019-11-06

## 2019-11-06 ENCOUNTER — RESULT REVIEW (OUTPATIENT)
Age: 64
End: 2019-11-06

## 2019-11-06 ENCOUNTER — LABORATORY RESULT (OUTPATIENT)
Age: 64
End: 2019-11-06

## 2019-11-06 LAB
BASOPHILS # BLD AUTO: 0 K/UL — SIGNIFICANT CHANGE UP (ref 0–0.2)
BASOPHILS NFR BLD AUTO: 0.5 % — SIGNIFICANT CHANGE UP (ref 0–2)
EOSINOPHIL # BLD AUTO: 0 K/UL — SIGNIFICANT CHANGE UP (ref 0–0.5)
EOSINOPHIL NFR BLD AUTO: 0 % — SIGNIFICANT CHANGE UP (ref 0–6)
HCT VFR BLD CALC: 29.9 % — LOW (ref 39–50)
HGB BLD-MCNC: 10 G/DL — LOW (ref 13–17)
LYMPHOCYTES # BLD AUTO: 1.5 K/UL — SIGNIFICANT CHANGE UP (ref 1–3.3)
LYMPHOCYTES # BLD AUTO: 19.8 % — SIGNIFICANT CHANGE UP (ref 13–44)
MCHC RBC-ENTMCNC: 31.2 PG — SIGNIFICANT CHANGE UP (ref 27–34)
MCHC RBC-ENTMCNC: 33.4 G/DL — SIGNIFICANT CHANGE UP (ref 32–36)
MCV RBC AUTO: 93.6 FL — SIGNIFICANT CHANGE UP (ref 80–100)
MONOCYTES # BLD AUTO: 0.8 K/UL — SIGNIFICANT CHANGE UP (ref 0–0.9)
MONOCYTES NFR BLD AUTO: 10.5 % — SIGNIFICANT CHANGE UP (ref 2–14)
NEUTROPHILS # BLD AUTO: 5.1 K/UL — SIGNIFICANT CHANGE UP (ref 1.8–7.4)
NEUTROPHILS NFR BLD AUTO: 69.2 % — SIGNIFICANT CHANGE UP (ref 43–77)
PLATELET # BLD AUTO: 201 K/UL — SIGNIFICANT CHANGE UP (ref 150–400)
RBC # BLD: 3.19 M/UL — LOW (ref 4.2–5.8)
RBC # FLD: 16 % — HIGH (ref 10.3–14.5)
WBC # BLD: 7.4 K/UL — SIGNIFICANT CHANGE UP (ref 3.8–10.5)
WBC # FLD AUTO: 7.4 K/UL — SIGNIFICANT CHANGE UP (ref 3.8–10.5)

## 2019-11-07 DIAGNOSIS — Z51.11 ENCOUNTER FOR ANTINEOPLASTIC CHEMOTHERAPY: ICD-10-CM

## 2019-11-13 ENCOUNTER — RESULT REVIEW (OUTPATIENT)
Age: 64
End: 2019-11-13

## 2019-11-13 ENCOUNTER — LABORATORY RESULT (OUTPATIENT)
Age: 64
End: 2019-11-13

## 2019-11-13 ENCOUNTER — APPOINTMENT (OUTPATIENT)
Dept: INFUSION THERAPY | Facility: HOSPITAL | Age: 64
End: 2019-11-13

## 2019-11-13 LAB
BASOPHILS # BLD AUTO: 0 K/UL — SIGNIFICANT CHANGE UP (ref 0–0.2)
BASOPHILS NFR BLD AUTO: 2 % — SIGNIFICANT CHANGE UP (ref 0–2)
EOSINOPHIL # BLD AUTO: 0 K/UL — SIGNIFICANT CHANGE UP (ref 0–0.5)
EOSINOPHIL NFR BLD AUTO: 1 % — SIGNIFICANT CHANGE UP (ref 0–6)
HCT VFR BLD CALC: 28.3 % — LOW (ref 39–50)
HGB BLD-MCNC: 9.8 G/DL — LOW (ref 13–17)
LYMPHOCYTES # BLD AUTO: 1.1 K/UL — SIGNIFICANT CHANGE UP (ref 1–3.3)
LYMPHOCYTES # BLD AUTO: 34 % — SIGNIFICANT CHANGE UP (ref 13–44)
MCHC RBC-ENTMCNC: 31.2 PG — SIGNIFICANT CHANGE UP (ref 27–34)
MCHC RBC-ENTMCNC: 34.5 G/DL — SIGNIFICANT CHANGE UP (ref 32–36)
MCV RBC AUTO: 90.6 FL — SIGNIFICANT CHANGE UP (ref 80–100)
MONOCYTES # BLD AUTO: 0.5 K/UL — SIGNIFICANT CHANGE UP (ref 0–0.9)
MONOCYTES NFR BLD AUTO: 7 % — SIGNIFICANT CHANGE UP (ref 2–14)
NEUTROPHILS # BLD AUTO: 3.4 K/UL — SIGNIFICANT CHANGE UP (ref 1.8–7.4)
NEUTROPHILS NFR BLD AUTO: 56 % — SIGNIFICANT CHANGE UP (ref 43–77)
PLAT MORPH BLD: NORMAL — SIGNIFICANT CHANGE UP
PLATELET # BLD AUTO: 232 K/UL — SIGNIFICANT CHANGE UP (ref 150–400)
RBC # BLD: 3.13 M/UL — LOW (ref 4.2–5.8)
RBC # FLD: 15.7 % — HIGH (ref 10.3–14.5)
RBC BLD AUTO: SIGNIFICANT CHANGE UP
WBC # BLD: 5 K/UL — SIGNIFICANT CHANGE UP (ref 3.8–10.5)
WBC # FLD AUTO: 5 K/UL — SIGNIFICANT CHANGE UP (ref 3.8–10.5)

## 2019-11-13 RX ORDER — FAMOTIDINE 10 MG/ML
1 INJECTION INTRAVENOUS
Qty: 0 | Refills: 0 | DISCHARGE

## 2019-11-13 RX ORDER — SIMVASTATIN 20 MG/1
1 TABLET, FILM COATED ORAL
Qty: 0 | Refills: 0 | DISCHARGE

## 2019-11-13 RX ORDER — LOSARTAN POTASSIUM 100 MG/1
1 TABLET, FILM COATED ORAL
Qty: 0 | Refills: 0 | DISCHARGE

## 2019-11-13 RX ORDER — INSULIN LISPRO 100/ML
4 VIAL (ML) SUBCUTANEOUS
Qty: 0 | Refills: 0 | DISCHARGE

## 2019-11-13 RX ORDER — INSULIN GLARGINE 100 [IU]/ML
30 INJECTION, SOLUTION SUBCUTANEOUS
Qty: 0 | Refills: 0 | DISCHARGE

## 2019-11-20 ENCOUNTER — LABORATORY RESULT (OUTPATIENT)
Age: 64
End: 2019-11-20

## 2019-11-20 ENCOUNTER — RESULT REVIEW (OUTPATIENT)
Age: 64
End: 2019-11-20

## 2019-11-20 ENCOUNTER — APPOINTMENT (OUTPATIENT)
Dept: INFUSION THERAPY | Facility: HOSPITAL | Age: 64
End: 2019-11-20

## 2019-11-20 ENCOUNTER — APPOINTMENT (OUTPATIENT)
Dept: HEMATOLOGY ONCOLOGY | Facility: CLINIC | Age: 64
End: 2019-11-20
Payer: MEDICAID

## 2019-11-20 VITALS
HEART RATE: 79 BPM | DIASTOLIC BLOOD PRESSURE: 59 MMHG | TEMPERATURE: 97.9 F | BODY MASS INDEX: 21.21 KG/M2 | OXYGEN SATURATION: 100 % | WEIGHT: 127.43 LBS | SYSTOLIC BLOOD PRESSURE: 95 MMHG | RESPIRATION RATE: 15 BRPM

## 2019-11-20 DIAGNOSIS — R91.8 OTHER NONSPECIFIC ABNORMAL FINDING OF LUNG FIELD: ICD-10-CM

## 2019-11-20 DIAGNOSIS — Z91.89 OTHER SPECIFIED PERSONAL RISK FACTORS, NOT ELSEWHERE CLASSIFIED: ICD-10-CM

## 2019-11-20 LAB
HCT VFR BLD CALC: 23.6 % — LOW (ref 39–50)
HGB BLD-MCNC: 8.2 G/DL — LOW (ref 13–17)
LYMPHOCYTES # BLD AUTO: 1.2 K/UL — SIGNIFICANT CHANGE UP (ref 1–3.3)
LYMPHOCYTES # BLD AUTO: 12 % — LOW (ref 13–44)
MCHC RBC-ENTMCNC: 30.9 PG — SIGNIFICANT CHANGE UP (ref 27–34)
MCHC RBC-ENTMCNC: 34.6 G/DL — SIGNIFICANT CHANGE UP (ref 32–36)
MCV RBC AUTO: 89.2 FL — SIGNIFICANT CHANGE UP (ref 80–100)
MONOCYTES # BLD AUTO: 0.8 K/UL — SIGNIFICANT CHANGE UP (ref 0–0.9)
MONOCYTES NFR BLD AUTO: 8 % — SIGNIFICANT CHANGE UP (ref 2–14)
NEUTROPHILS # BLD AUTO: 4.5 K/UL — SIGNIFICANT CHANGE UP (ref 1.8–7.4)
NEUTROPHILS NFR BLD AUTO: 80 % — HIGH (ref 43–77)
PLAT MORPH BLD: NORMAL — SIGNIFICANT CHANGE UP
PLATELET # BLD AUTO: 45 K/UL — LOW (ref 150–400)
RBC # BLD: 2.65 M/UL — LOW (ref 4.2–5.8)
RBC # FLD: 16 % — HIGH (ref 10.3–14.5)
RBC BLD AUTO: SIGNIFICANT CHANGE UP
WBC # BLD: 6.4 K/UL — SIGNIFICANT CHANGE UP (ref 3.8–10.5)
WBC # FLD AUTO: 6.4 K/UL — SIGNIFICANT CHANGE UP (ref 3.8–10.5)

## 2019-11-20 PROCEDURE — 99214 OFFICE O/P EST MOD 30 MIN: CPT

## 2019-11-20 NOTE — REVIEW OF SYSTEMS
[Negative] : Allergic/Immunologic [Chest Pain] : no chest pain [Fever] : no fever [Abdominal Pain] : no abdominal pain [Fainting] : no fainting [Proptosis] : no proptosis

## 2019-11-20 NOTE — REASON FOR VISIT
[Follow-Up Visit] : a follow-up [Spouse] : spouse [Family Member] : family member [FreeTextEntry2] : SCLC

## 2019-11-20 NOTE — PHYSICAL EXAM
[Normal] : normal spine exam without palpable tenderness, no kyphosis or scoliosis [de-identified] : non-toxic appearing [de-identified] : decreased BS bases [de-identified] : chronic venous stasis changes RLE;no calf tenderness [de-identified] : soft, NT without palpable hepatosplenomegaly [de-identified] : papery thin; dry [de-identified] : cooperative; generally weak

## 2019-11-20 NOTE — HISTORY OF PRESENT ILLNESS
[Disease: _____________________] : Disease: [unfilled] [T: ___] : T[unfilled] [N: ___] : N[unfilled] [M: ___] : M[unfilled] [AJCC Stage: ____] : AJCC Stage: [unfilled] [de-identified] : Patient came to the [place] from [facility] 9/2018. He reported an approximately one-year history of a cough. Recently had had some associated chest discomfort. He saw his primary care physician for evaluation, and chest x-ray 11/2018 showed a left upper lobe masslike opacity measuring 3.7 cm in maximum dimension. Also enlargement of the left hilum. Pleural thickening adjacent to the masslike opacity. PET/CT scan showed a hypermetabolic spiculated left upper lobe lung mass with gross involvement of the mid to upper left pleura with associated loculated effusions. Suspicious hypermetabolic regional megan disease in the left hilum and left anterior mediastinum. Hypermetabolic lytic foci in the axial skeleton suspicious for multifocal osteolytic metastatic disease. A hypermetabolic nodule noted in the left lobe of the thyroid, nonspecific.\par FB, EBUS biopsy 12/3/18 performed (Dr. Looney). Left upper lobe endobronchial lesion/mass consistent with small cell carcinoma. BAL of left upper lobe negative for malignant cells. Patient began Etoposide/Carboplatin 12/31/18, completing 4th cycle 3/8/19.\par 3/2019-CT scan of the chest/abdomen/pelvis (addended report)-when compared to 11/2018 PET/CT scan showed the largest spiculated nodule of the left upper lobe of the lung improved, prominent left pleural thickening associated with the left upper lobe mass, markedly improved, other pleural-based nodules near completely resolved. Interval resolution of pleural effusions. Marked interval improvement in mediastinal and hilar adenopathy. New lytic and sclerotic lesions of bone. \par 4/2019-brain MRI showed a new tiny enhancing lesion in the left frontal subcortical region compatible with metastasis. Patient underwent gamma knife radiosurgery, completing 5/2019. 4/2019-Began Nivolumab.\par 7/2019-CT scan chest/abdomen/pelvis-new left upper lobe satellite nodules with increased left mediastinal and left hilar adenopathy, consistent with disease progression. Interval pathologic fracture of right posterior sixth rib, otherwise stable osseous metastases. Began Gemzar.\par 9/2019-MRI of the brain showed multiple new metastatic lesions-> s/p whole brain radiation therapy. [de-identified] : small cell carcinoma [de-identified] : Wife and son-in-law present. +Intermittent N/V. No complaints of chest pain, cough, shortness of breath. No abdominal/pelvic pain. No diarrhea. No bleeding. No complaints of headache or change in vision. No fevers.\par Accompanied by son-in-law (Lin's ) and patient's wife.\par \par \par \par \par

## 2019-11-20 NOTE — ASSESSMENT
[FreeTextEntry1] : I am concerned regarding nausea/vomiting. Gemzar chemotherapy held today in light of cytopenias which are exacerbated by chemotherapy. No overt bleeding noted clinically at present. Recommend followup imaging for further management decisions.\par Discussed with son-in-law serious nature of patient's illness and palliative treatment intent. Should patient continue to decline, need to consider best supportive care. Son-in-law expresses understanding and stated he may decide to bring patient to his home country for a visit if possible, following scans depending on results.  Patient's/family's questions have been answered to their apparent satisfaction at this time. \par

## 2019-12-04 ENCOUNTER — OUTPATIENT (OUTPATIENT)
Dept: OUTPATIENT SERVICES | Facility: HOSPITAL | Age: 64
LOS: 1 days | Discharge: HOME | End: 2019-12-04

## 2019-12-04 DIAGNOSIS — Z98.890 OTHER SPECIFIED POSTPROCEDURAL STATES: Chronic | ICD-10-CM

## 2019-12-04 DIAGNOSIS — C34.90 MALIGNANT NEOPLASM OF UNSPECIFIED PART OF UNSPECIFIED BRONCHUS OR LUNG: ICD-10-CM

## 2019-12-07 ENCOUNTER — FORM ENCOUNTER (OUTPATIENT)
Age: 64
End: 2019-12-07

## 2019-12-08 ENCOUNTER — APPOINTMENT (OUTPATIENT)
Dept: CT IMAGING | Facility: IMAGING CENTER | Age: 64
End: 2019-12-08
Payer: MEDICAID

## 2019-12-08 ENCOUNTER — APPOINTMENT (OUTPATIENT)
Dept: MRI IMAGING | Facility: IMAGING CENTER | Age: 64
End: 2019-12-08
Payer: MEDICAID

## 2019-12-08 ENCOUNTER — OUTPATIENT (OUTPATIENT)
Dept: OUTPATIENT SERVICES | Facility: HOSPITAL | Age: 64
LOS: 1 days | End: 2019-12-08
Payer: MEDICAID

## 2019-12-08 DIAGNOSIS — C34.90 MALIGNANT NEOPLASM OF UNSPECIFIED PART OF UNSPECIFIED BRONCHUS OR LUNG: ICD-10-CM

## 2019-12-08 DIAGNOSIS — C79.31 SECONDARY MALIGNANT NEOPLASM OF BRAIN: ICD-10-CM

## 2019-12-08 DIAGNOSIS — Z98.890 OTHER SPECIFIED POSTPROCEDURAL STATES: Chronic | ICD-10-CM

## 2019-12-08 PROCEDURE — 70553 MRI BRAIN STEM W/O & W/DYE: CPT | Mod: 26

## 2019-12-08 PROCEDURE — 71260 CT THORAX DX C+: CPT | Mod: 26

## 2019-12-08 PROCEDURE — A9585: CPT

## 2019-12-08 PROCEDURE — 70553 MRI BRAIN STEM W/O & W/DYE: CPT

## 2019-12-08 PROCEDURE — 74177 CT ABD & PELVIS W/CONTRAST: CPT | Mod: 26

## 2019-12-08 PROCEDURE — 71260 CT THORAX DX C+: CPT

## 2019-12-08 PROCEDURE — 74177 CT ABD & PELVIS W/CONTRAST: CPT

## 2019-12-10 ENCOUNTER — APPOINTMENT (OUTPATIENT)
Dept: HEMATOLOGY ONCOLOGY | Facility: CLINIC | Age: 64
End: 2019-12-10
Payer: MEDICAID

## 2019-12-10 VITALS
OXYGEN SATURATION: 100 % | HEART RATE: 91 BPM | SYSTOLIC BLOOD PRESSURE: 82 MMHG | DIASTOLIC BLOOD PRESSURE: 55 MMHG | RESPIRATION RATE: 16 BRPM | TEMPERATURE: 98 F | WEIGHT: 122.8 LBS | BODY MASS INDEX: 20.44 KG/M2

## 2019-12-10 PROCEDURE — 99215 OFFICE O/P EST HI 40 MIN: CPT

## 2020-01-03 ENCOUNTER — APPOINTMENT (OUTPATIENT)
Dept: HEMATOLOGY ONCOLOGY | Facility: CLINIC | Age: 65
End: 2020-01-03

## 2020-01-03 DIAGNOSIS — C79.31 SECONDARY MALIGNANT NEOPLASM OF BRAIN: ICD-10-CM

## 2020-01-03 DIAGNOSIS — C34.90 MALIGNANT NEOPLASM OF UNSPECIFIED PART OF UNSPECIFIED BRONCHUS OR LUNG: ICD-10-CM

## 2020-01-03 RX ORDER — PROCHLORPERAZINE MALEATE 10 MG/1
10 TABLET ORAL
Qty: 60 | Refills: 0 | Status: ACTIVE | COMMUNITY
Start: 2019-11-20 | End: 1900-01-01

## 2020-01-03 NOTE — PHYSICAL EXAM
[Normal] : affect appropriate [Capable of only limited self care, confined to bed or chair more than 50% of waking hours] : Status 3- Capable of only limited self care, confined to bed or chair more than 50% of waking hours [de-identified] : non-toxic appearing [de-identified] : decreased BS bases [de-identified] : soft, NT without palpable hepatosplenomegaly [de-identified] : chronic venous stasis changes RLE;no calf tenderness [de-identified] : cooperative; generally weak [de-identified] : papery thin; dry

## 2020-01-03 NOTE — HISTORY OF PRESENT ILLNESS
[Disease: _____________________] : Disease: [unfilled] [T: ___] : T[unfilled] [N: ___] : N[unfilled] [M: ___] : M[unfilled] [AJCC Stage: ____] : AJCC Stage: [unfilled] [de-identified] : Patient came to the [place] from [facility] 9/2018. He reported an approximately one-year history of a cough. Recently had had some associated chest discomfort. He saw his primary care physician for evaluation, and chest x-ray 11/2018 showed a left upper lobe masslike opacity measuring 3.7 cm in maximum dimension. Also enlargement of the left hilum. Pleural thickening adjacent to the masslike opacity. PET/CT scan showed a hypermetabolic spiculated left upper lobe lung mass with gross involvement of the mid to upper left pleura with associated loculated effusions. Suspicious hypermetabolic regional megan disease in the left hilum and left anterior mediastinum. Hypermetabolic lytic foci in the axial skeleton suspicious for multifocal osteolytic metastatic disease. A hypermetabolic nodule noted in the left lobe of the thyroid, nonspecific.\par FB, EBUS biopsy 12/3/18 performed (Dr. Looney). Left upper lobe endobronchial lesion/mass consistent with small cell carcinoma. BAL of left upper lobe negative for malignant cells. Patient began Etoposide/Carboplatin 12/31/18, completing 4th cycle 3/8/19.\par 3/2019-CT scan of the chest/abdomen/pelvis (addended report)-when compared to 11/2018 PET/CT scan showed the largest spiculated nodule of the left upper lobe of the lung improved, prominent left pleural thickening associated with the left upper lobe mass, markedly improved, other pleural-based nodules near completely resolved. Interval resolution of pleural effusions. Marked interval improvement in mediastinal and hilar adenopathy. New lytic and sclerotic lesions of bone. \par 4/2019-brain MRI showed a new tiny enhancing lesion in the left frontal subcortical region compatible with metastasis. Patient underwent gamma knife radiosurgery, completing 5/2019. 4/2019-Began Nivolumab.\par 7/2019-CT scan chest/abdomen/pelvis-new left upper lobe satellite nodules with increased left mediastinal and left hilar adenopathy, consistent with disease progression. Interval pathologic fracture of right posterior sixth rib, otherwise stable osseous metastases. Began Gemzar.\par 9/2019-MRI of the brain showed multiple new metastatic lesions-> s/p whole brain radiation therapy.\par 11/2019-CT chest-since 7/2019, left upper lobe nodule increased in size, periaortic lymph node increased in size. [de-identified] : small cell carcinoma [de-identified] : Patient fatigues easily, in chair/bed most of the day. No complaints of chest pain, cough, shortness of breath. No abdominal/pelvic pain. No diarrhea. No bleeding. No complaints of headache or change in vision. No fevers.\par Accompanied by son-in-law (Lin's ) and patient's wife.\par \par \par \par \par

## 2020-01-03 NOTE — ASSESSMENT
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 Discussed scan results with patient/family, explaining progression of disease. I am concerned regarding patient's declining performance status. Discussed management options at this point, including best supportive care/hospice/palliative care. If performance status improves, and patient wishes to pursue further cancer therapy, would recommend further evaluation of disease extent, with PET/CT scan.\par If the patient wishes to return to [facility] for visit, I have advised son-in-law to arrange for this sooner rather than later. He expressed his understanding of the serious nature of patient's situation, and that should patient attempt to travel, he may not be physically able to return back to the [place], secondary to his fragile physical status. Son-in-law wishes to speak with his wife/family before further management decisions. He stated patient may not return to the office depending upon whether or not he decides to go to [facility]. They will contact our office with any further questions or if/when patient wishes for oncologic followup. Their questions have been answered to their apparent satisfaction at this time. They are appreciative of care.

## 2020-01-03 NOTE — REVIEW OF SYSTEMS
[Negative] : Allergic/Immunologic [Fever] : no fever [Muscle Pain] : no muscle pain [Chest Pain] : no chest pain [Proptosis] : no proptosis

## 2020-06-16 NOTE — PROVIDER CONTACT NOTE (CRITICAL VALUE NOTIFICATION) - ACTION/TREATMENT ORDERED:
Preliminary findings, culture in progress. Will monitor. Preliminary findings, culture in progress. One time dose of vancomycin to be administered. Will monitor. outpt PT prn/home

## 2020-11-20 NOTE — PHYSICAL THERAPY INITIAL EVALUATION ADULT - NAME OF CLINICIAN
Writer spoke to Prince guerin today to R/S his procedure from tomorrow 11/21/20 to Friday 12/4/20 @ 12:00 new Covid test 11/30/20 @ 2:50 at [facility]. Pt would like earlier in the am if someone cancels. ARNALDO Rose

## 2022-06-02 NOTE — ED PROVIDER NOTE - CPE EDP CARDIAC NORM
[facility] Diabetes Education Class    Patient attended outpatient [address] Diabetes Education Classes. Dates and class topics attended are noted below. Location: [facility]    Date Attended Class/Topic   6/1/2022 Class 1: Diabetes Nutrition  Strategies to staying healthy with diabetes were covered including glycemic targets, meal planning strategies, carbohydrate counting, and label reading. Please contact me if you have questions.     Regards,      Kassi Marquez RD  Glycemic Control Team  [phone]    Monday-Friday   9 am - 3 pm normal...

## 2023-10-12 NOTE — CONSULT NOTE ADULT - ASSESSMENT
ASSESSMENT:    RECOMMENDATIONS:    GUI Osborn, MD  Fellow, Infectious Diseases  Pager: [phone]  After 5pm and on Weekends: Call [phone] ASSESSMENT:  - 63/M with PMHextensive small cell lung cancer, metastatic to bone, and brain on Gemcitabine (last dose Mon 7/29/19), HTN, HLD, DM, CKD. P/w fever, chills, urinary incontinence and cough with white sputum. Evaluated by Podiatry for b/l foot wounds, wound discharge sent from right foot wound. ID consulted for abx recs  --------  - unclear cause of fevers and if wounds are contributing. They appear healthy at this time. However, pus was expressed from wound  - CXR clear. UA negative, Urine Cx contaminated  - Xrays suggestive of age-indeterminate fractures of rt foot distal phalanx, no e/o acute OM. ESR and CRP elevated  - no report of trauma to great toe    RECOMMENDATIONS:  - ct Zosyn 3.375g IV q8h for now  - if spiking fevers or s/o hemodynamic instability, would resend blood cultures and add Vancomycin 1g IV q12h  - f/u wound cx and blood cs  - wound care per Podiatry team  Recs conveyed to primary team    GUI Osborn, MD  Fellow, Infectious Diseases  Pager: [phone]  After 5pm and on Weekends: Call [phone] ASSESSMENT:  - 63/M with PMHextensive small cell lung cancer, metastatic to bone, and brain on Gemcitabine (last dose Mon 7/29/19), HTN, HLD, DM, CKD. P/w fever, chills, urinary incontinence and cough with white sputum. Evaluated by Podiatry for b/l foot wounds, wound discharge sent from right foot wound. ID consulted for abx recs  --------  - unclear cause of fevers and if wounds are contributing. They appear healthy at this time. However, pus was expressed from wound  - CXR clear. UA negative, Urine Cx contaminated  - Xrays suggestive of age-indeterminate fractures of rt foot distal phalanx, no e/o acute OM. ESR and CRP elevated  - no report of trauma to great toe    RECOMMENDATIONS:  - ct Zosyn 3.375g IV q8h for now  - if spiking fevers or s/o hemodynamic instability, would resend urinalysis, urine cultures, blood cultures and then add Vancomycin 1g IV q12h  - if Vancomycin started, please check Vanc trough before 4th sequential dose  - f/u wound cx and blood cx  - wound care per Podiatry team  Recs conveyed to primary team    GUI Osborn, MD  Fellow, Infectious Diseases  Pager: [phone]  After 5pm and on Weekends: Call [phone] .

## 2023-12-21 NOTE — ASU PATIENT PROFILE, ADULT - HAS THE PATIENT USED TOBACCO IN THE PAST 30 DAYS?
PRE-SEDATION ASSESSMENT    CONSENT  Risks, benefits, and alternatives have been discussed with the patient/patient representative, and patient/patient representative agrees to proceed: Yes    MEDICAL HISTORY  Significant medical/surgical history: Yes  Past Complications with Sedation/Anesthesia: No  Significant Family History: No  Smoking History: No  Alcohol/Drug abuse: No  Possible Pregnancy (LMP): No  Cardiac History: No  Respiratory History: No    PHYSICAL EXAM  History and Physical Reviewed: H&P completed today  Airway Risk History: No previous complications  Airway Anatomy : Class II  Heart : Normal  Lungs : Normal  LOC/Mental Status : Normal    OTHER FINDINGS  Reviewed current medications and allergies: Yes  Pertinent lab/diagnostic test reviewed: Yes    SEDATION RISK ASSESSMENT  Risk Status ASA: Class II - Normal patient with mild systemic disease  Plan for Sedation: Moderate Sedation  Indications for Procedure/Pre-Procedure Diagnosis and Planned Procedure: Colonoscopy, history of colon polyps    NARRATIVE FINDINGS     
No

## 2024-11-19 NOTE — PROGRESS NOTE ADULT - PROBLEM SELECTOR PROBLEM 3
Malignant neoplasm of lung, unspecified laterality, unspecified part of lung
R/O Urinary retention
Malignant neoplasm of lung, unspecified laterality, unspecified part of lung
Bicytopenia
Malignant neoplasm of lung, unspecified laterality, unspecified part of lung
Malignant neoplasm of lung, unspecified laterality, unspecified part of lung
MEDICATIONS  (STANDING):  cholecalciferol 600 Unit(s) Oral daily  divalproex  milliGRAM(s) Oral two times a day  folic acid 1 milliGRAM(s) Oral daily  hydroxychloroquine 200 milliGRAM(s) Oral two times a day  methotrexate 7.5 milliGRAM(s) Oral <User Schedule>  multivitamin 1 Tablet(s) Oral daily  OLANZapine 10 milliGRAM(s) Oral at bedtime  thiamine 100 milliGRAM(s) Oral daily    MEDICATIONS  (PRN):  acetaminophen     Tablet .. 650 milliGRAM(s) Oral every 6 hours PRN Moderate Pain (4 - 6)  aluminum hydroxide/magnesium hydroxide/simethicone Suspension 30 milliLiter(s) Oral every 6 hours PRN Dyspepsia  ibuprofen  Tablet. 200 milliGRAM(s) Oral three times a day PRN Moderate Pain (4 - 6)  OLANZapine 5 milliGRAM(s) Oral every 8 hours PRN agitation  
Malignant neoplasm of lung, unspecified laterality, unspecified part of lung
Malignant neoplasm of lung, unspecified laterality, unspecified part of lung
In pt's possession